# Patient Record
Sex: FEMALE | Race: WHITE | NOT HISPANIC OR LATINO | Employment: FULL TIME | URBAN - METROPOLITAN AREA
[De-identification: names, ages, dates, MRNs, and addresses within clinical notes are randomized per-mention and may not be internally consistent; named-entity substitution may affect disease eponyms.]

---

## 2017-01-09 RX ORDER — LEVOTHYROXINE SODIUM 0.12 MG/1
125 TABLET ORAL DAILY
Qty: 90 TABLET | Refills: 0 | Status: SHIPPED | OUTPATIENT
Start: 2017-01-09 | End: 2017-04-04 | Stop reason: SDUPTHER

## 2017-01-12 DIAGNOSIS — E27.40 CHRONIC ADRENAL INSUFFICIENCY (HCC): ICD-10-CM

## 2017-01-12 RX ORDER — HYDROCORTISONE 10 MG/1
10 TABLET ORAL
Qty: 540 TABLET | Refills: 1 | Status: SHIPPED | OUTPATIENT
Start: 2017-01-12 | End: 2017-01-30 | Stop reason: SDUPTHER

## 2017-01-23 RX ORDER — VALACYCLOVIR HYDROCHLORIDE 500 MG/1
TABLET, FILM COATED ORAL
Qty: 68 TABLET | Refills: 0 | Status: SHIPPED | OUTPATIENT
Start: 2017-01-23 | End: 2017-04-27 | Stop reason: SDUPTHER

## 2017-01-25 ENCOUNTER — TELEPHONE (OUTPATIENT)
Dept: ENDOCRINOLOGY | Age: 57
End: 2017-01-25

## 2017-01-25 DIAGNOSIS — E03.9 HYPOTHYROIDISM (ACQUIRED): ICD-10-CM

## 2017-01-25 DIAGNOSIS — E27.1 ADDISON'S DISEASE (HCC): Primary | ICD-10-CM

## 2017-01-25 DIAGNOSIS — E27.40 CHRONIC ADRENAL INSUFFICIENCY (HCC): ICD-10-CM

## 2017-01-25 DIAGNOSIS — E27.49 MINERALOCORTICOID DEFICIENCY (HCC): ICD-10-CM

## 2017-01-25 NOTE — TELEPHONE ENCOUNTER
----- Message from Pauline Sam sent at 1/25/2017 11:46 AM EST -----  Contact: PATIENT  I SCHEDULED LAB APPT FOR TOMORROW 1-26-17, 9:10AM.  WILL YOU PLEASE HAVE  LAB ORDERS ENTERED? THANK YOU.      ORDERS ENTERED

## 2017-01-26 ENCOUNTER — LAB (OUTPATIENT)
Dept: ENDOCRINOLOGY | Age: 57
End: 2017-01-26

## 2017-01-26 DIAGNOSIS — E27.40 CHRONIC ADRENAL INSUFFICIENCY (HCC): ICD-10-CM

## 2017-01-26 DIAGNOSIS — R53.82 CHRONIC FATIGUE: ICD-10-CM

## 2017-01-26 DIAGNOSIS — E27.49 MINERALOCORTICOID DEFICIENCY (HCC): ICD-10-CM

## 2017-01-26 DIAGNOSIS — R53.82 CHRONIC FATIGUE: Primary | ICD-10-CM

## 2017-01-26 DIAGNOSIS — E03.9 HYPOTHYROIDISM (ACQUIRED): ICD-10-CM

## 2017-01-26 DIAGNOSIS — E27.1 ADDISON'S DISEASE (HCC): ICD-10-CM

## 2017-01-27 LAB
25(OH)D3+25(OH)D2 SERPL-MCNC: 66 NG/ML (ref 30–100)
ACTH PLAS-MCNC: 24.2 PG/ML (ref 7.2–63.3)
ALBUMIN SERPL-MCNC: 4 G/DL (ref 3.5–5.2)
ALBUMIN/GLOB SERPL: 1.8 G/DL
ALP SERPL-CCNC: 189 U/L (ref 39–117)
ALT SERPL-CCNC: 31 U/L (ref 1–33)
AST SERPL-CCNC: 20 U/L (ref 1–32)
BILIRUB SERPL-MCNC: 0.2 MG/DL (ref 0.1–1.2)
BUN SERPL-MCNC: 18 MG/DL (ref 6–20)
BUN/CREAT SERPL: 14 (ref 7–25)
CALCIUM SERPL-MCNC: 8.7 MG/DL (ref 8.6–10.5)
CHLORIDE SERPL-SCNC: 105 MMOL/L (ref 98–107)
CO2 SERPL-SCNC: 22.7 MMOL/L (ref 22–29)
CORTIS AM PEAK SERPL-MCNC: 0.1 UG/DL (ref 6.2–19.4)
CREAT SERPL-MCNC: 1.29 MG/DL (ref 0.57–1)
GLOBULIN SER CALC-MCNC: 2.2 GM/DL
GLUCOSE SERPL-MCNC: 81 MG/DL (ref 65–99)
POTASSIUM SERPL-SCNC: 4.1 MMOL/L (ref 3.5–5.2)
PROT SERPL-MCNC: 6.2 G/DL (ref 6–8.5)
SODIUM SERPL-SCNC: 142 MMOL/L (ref 136–145)
T3 SERPL-MCNC: 85.4 NG/DL (ref 80–200)
T4 FREE SERPL-MCNC: 1.47 NG/DL (ref 0.93–1.7)
TSH SERPL DL<=0.005 MIU/L-ACNC: 1.88 MIU/ML (ref 0.27–4.2)

## 2017-01-27 RX ORDER — FLUDROCORTISONE ACETATE 0.1 MG/1
TABLET ORAL
Qty: 30 TABLET | Refills: 0 | Status: SHIPPED | OUTPATIENT
Start: 2017-01-27 | End: 2017-02-25 | Stop reason: SDUPTHER

## 2017-01-30 ENCOUNTER — OFFICE VISIT (OUTPATIENT)
Dept: ENDOCRINOLOGY | Age: 57
End: 2017-01-30

## 2017-01-30 VITALS
WEIGHT: 208 LBS | HEIGHT: 66 IN | BODY MASS INDEX: 33.43 KG/M2 | DIASTOLIC BLOOD PRESSURE: 68 MMHG | SYSTOLIC BLOOD PRESSURE: 102 MMHG | HEART RATE: 95 BPM | OXYGEN SATURATION: 98 %

## 2017-01-30 DIAGNOSIS — R53.82 CHRONIC FATIGUE: ICD-10-CM

## 2017-01-30 DIAGNOSIS — E27.40 CHRONIC ADRENAL INSUFFICIENCY (HCC): ICD-10-CM

## 2017-01-30 DIAGNOSIS — R63.5 ABNORMAL WEIGHT GAIN: ICD-10-CM

## 2017-01-30 DIAGNOSIS — E27.49 MINERALOCORTICOID DEFICIENCY (HCC): ICD-10-CM

## 2017-01-30 DIAGNOSIS — E03.8 OTHER SPECIFIED HYPOTHYROIDISM: Primary | ICD-10-CM

## 2017-01-30 PROCEDURE — 99215 OFFICE O/P EST HI 40 MIN: CPT | Performed by: INTERNAL MEDICINE

## 2017-01-30 RX ORDER — HYDROCORTISONE 10 MG/1
TABLET ORAL
Qty: 180 TABLET | Refills: 3 | Status: SHIPPED | OUTPATIENT
Start: 2017-01-30 | End: 2017-08-17 | Stop reason: SDUPTHER

## 2017-01-30 RX ORDER — LIOTHYRONINE SODIUM 5 UG/1
5 TABLET ORAL DAILY
Qty: 30 TABLET | Refills: 3 | Status: SHIPPED | OUTPATIENT
Start: 2017-01-30 | End: 2017-05-26 | Stop reason: SDUPTHER

## 2017-02-27 RX ORDER — FLUDROCORTISONE ACETATE 0.1 MG/1
TABLET ORAL
Qty: 30 TABLET | Refills: 0 | Status: SHIPPED | OUTPATIENT
Start: 2017-02-27 | End: 2017-03-25 | Stop reason: SDUPTHER

## 2017-03-27 RX ORDER — FLUDROCORTISONE ACETATE 0.1 MG/1
TABLET ORAL
Qty: 30 TABLET | Refills: 0 | Status: SHIPPED | OUTPATIENT
Start: 2017-03-27 | End: 2017-04-24 | Stop reason: SDUPTHER

## 2017-04-04 RX ORDER — LEVOTHYROXINE SODIUM 0.12 MG/1
TABLET ORAL
Qty: 90 TABLET | Refills: 0 | Status: SHIPPED | OUTPATIENT
Start: 2017-04-04 | End: 2017-07-04 | Stop reason: SDUPTHER

## 2017-04-24 RX ORDER — OMEPRAZOLE 20 MG/1
CAPSULE, DELAYED RELEASE ORAL
Qty: 90 CAPSULE | Refills: 1 | Status: SHIPPED | OUTPATIENT
Start: 2017-04-24 | End: 2017-08-17 | Stop reason: SDUPTHER

## 2017-04-24 RX ORDER — FLUDROCORTISONE ACETATE 0.1 MG/1
TABLET ORAL
Qty: 30 TABLET | Refills: 0 | Status: SHIPPED | OUTPATIENT
Start: 2017-04-24 | End: 2017-05-25 | Stop reason: SDUPTHER

## 2017-04-27 RX ORDER — VALACYCLOVIR HYDROCHLORIDE 500 MG/1
TABLET, FILM COATED ORAL
Qty: 68 TABLET | Refills: 0 | Status: SHIPPED | OUTPATIENT
Start: 2017-04-27 | End: 2017-06-18 | Stop reason: SDUPTHER

## 2017-05-09 ENCOUNTER — OFFICE VISIT (OUTPATIENT)
Dept: FAMILY MEDICINE CLINIC | Facility: CLINIC | Age: 57
End: 2017-05-09

## 2017-05-09 VITALS
DIASTOLIC BLOOD PRESSURE: 60 MMHG | OXYGEN SATURATION: 92 % | BODY MASS INDEX: 33.59 KG/M2 | HEART RATE: 101 BPM | HEIGHT: 66 IN | SYSTOLIC BLOOD PRESSURE: 120 MMHG | TEMPERATURE: 98.1 F | WEIGHT: 209 LBS

## 2017-05-09 DIAGNOSIS — E66.9 SIMPLE OBESITY: ICD-10-CM

## 2017-05-09 DIAGNOSIS — F90.0 ATTENTION DEFICIT HYPERACTIVITY DISORDER (ADHD), PREDOMINANTLY INATTENTIVE TYPE: Primary | ICD-10-CM

## 2017-05-09 DIAGNOSIS — D68.61 ANTIPHOSPHOLIPID SYNDROME (HCC): ICD-10-CM

## 2017-05-09 PROCEDURE — 99214 OFFICE O/P EST MOD 30 MIN: CPT | Performed by: FAMILY MEDICINE

## 2017-05-25 RX ORDER — FLUDROCORTISONE ACETATE 0.1 MG/1
TABLET ORAL
Qty: 30 TABLET | Refills: 0 | Status: SHIPPED | OUTPATIENT
Start: 2017-05-25 | End: 2017-06-24 | Stop reason: SDUPTHER

## 2017-05-26 RX ORDER — LIOTHYRONINE SODIUM 5 UG/1
TABLET ORAL
Qty: 30 TABLET | Refills: 2 | Status: SHIPPED | OUTPATIENT
Start: 2017-05-26 | End: 2017-07-12 | Stop reason: SDUPTHER

## 2017-06-19 DIAGNOSIS — E27.40 CHRONIC ADRENAL INSUFFICIENCY (HCC): Primary | ICD-10-CM

## 2017-06-19 DIAGNOSIS — E27.1 ADDISON'S DISEASE (HCC): ICD-10-CM

## 2017-06-19 DIAGNOSIS — E03.9 HYPOTHYROIDISM (ACQUIRED): ICD-10-CM

## 2017-06-19 LAB
ACTH PLAS-MCNC: 100.6 PG/ML (ref 7.2–63.3)
ALBUMIN SERPL-MCNC: 4.2 G/DL (ref 3.5–5.2)
ALBUMIN/GLOB SERPL: 1.7 G/DL
ALP SERPL-CCNC: 173 U/L (ref 39–117)
ALT SERPL-CCNC: 14 U/L (ref 1–33)
AST SERPL-CCNC: 17 U/L (ref 1–32)
BILIRUB SERPL-MCNC: 0.4 MG/DL (ref 0.1–1.2)
BUN SERPL-MCNC: 15 MG/DL (ref 6–20)
BUN/CREAT SERPL: 12.6 (ref 7–25)
CALCIUM SERPL-MCNC: 9.1 MG/DL (ref 8.6–10.5)
CHLORIDE SERPL-SCNC: 105 MMOL/L (ref 98–107)
CO2 SERPL-SCNC: 23.1 MMOL/L (ref 22–29)
CORTIS SERPL-MCNC: 0.2 UG/DL
CREAT SERPL-MCNC: 1.19 MG/DL (ref 0.57–1)
GLOBULIN SER CALC-MCNC: 2.5 GM/DL
GLUCOSE SERPL-MCNC: 85 MG/DL (ref 65–99)
POTASSIUM SERPL-SCNC: 4.2 MMOL/L (ref 3.5–5.2)
PROT SERPL-MCNC: 6.7 G/DL (ref 6–8.5)
SODIUM SERPL-SCNC: 143 MMOL/L (ref 136–145)
T3 SERPL-MCNC: 124.4 NG/DL (ref 80–200)
T4 FREE SERPL-MCNC: 1.7 NG/DL (ref 0.93–1.7)
TSH SERPL DL<=0.005 MIU/L-ACNC: 0.15 MIU/ML (ref 0.27–4.2)

## 2017-06-19 RX ORDER — VALACYCLOVIR HYDROCHLORIDE 500 MG/1
TABLET, FILM COATED ORAL
Qty: 68 TABLET | Refills: 0 | Status: SHIPPED | OUTPATIENT
Start: 2017-06-19 | End: 2017-08-17 | Stop reason: SDUPTHER

## 2017-06-20 ENCOUNTER — RESULTS ENCOUNTER (OUTPATIENT)
Dept: ENDOCRINOLOGY | Age: 57
End: 2017-06-20

## 2017-06-20 DIAGNOSIS — E27.40 CHRONIC ADRENAL INSUFFICIENCY (HCC): ICD-10-CM

## 2017-06-20 DIAGNOSIS — E03.9 HYPOTHYROIDISM (ACQUIRED): ICD-10-CM

## 2017-06-20 DIAGNOSIS — E27.1 ADDISON'S DISEASE (HCC): ICD-10-CM

## 2017-06-26 RX ORDER — FLUDROCORTISONE ACETATE 0.1 MG/1
TABLET ORAL
Qty: 30 TABLET | Refills: 0 | Status: SHIPPED | OUTPATIENT
Start: 2017-06-26 | End: 2017-07-12 | Stop reason: SDUPTHER

## 2017-07-05 RX ORDER — LEVOTHYROXINE SODIUM 0.12 MG/1
TABLET ORAL
Qty: 90 TABLET | Refills: 0 | Status: SHIPPED | OUTPATIENT
Start: 2017-07-05 | End: 2017-07-12 | Stop reason: SDUPTHER

## 2017-07-12 ENCOUNTER — OFFICE VISIT (OUTPATIENT)
Dept: ENDOCRINOLOGY | Age: 57
End: 2017-07-12

## 2017-07-12 VITALS
DIASTOLIC BLOOD PRESSURE: 82 MMHG | OXYGEN SATURATION: 98 % | WEIGHT: 209 LBS | HEIGHT: 66 IN | BODY MASS INDEX: 33.59 KG/M2 | HEART RATE: 93 BPM | SYSTOLIC BLOOD PRESSURE: 124 MMHG

## 2017-07-12 DIAGNOSIS — E27.49 MINERALOCORTICOID DEFICIENCY (HCC): ICD-10-CM

## 2017-07-12 DIAGNOSIS — E27.40 CHRONIC ADRENAL INSUFFICIENCY (HCC): Primary | ICD-10-CM

## 2017-07-12 DIAGNOSIS — E03.9 HYPOTHYROIDISM (ACQUIRED): ICD-10-CM

## 2017-07-12 DIAGNOSIS — R63.5 ABNORMAL WEIGHT GAIN: ICD-10-CM

## 2017-07-12 DIAGNOSIS — R53.82 CHRONIC FATIGUE: ICD-10-CM

## 2017-07-12 PROCEDURE — 99214 OFFICE O/P EST MOD 30 MIN: CPT | Performed by: INTERNAL MEDICINE

## 2017-07-12 RX ORDER — FLUDROCORTISONE ACETATE 0.1 MG/1
0.1 TABLET ORAL DAILY
Qty: 90 TABLET | Refills: 1 | Status: SHIPPED | OUTPATIENT
Start: 2017-07-12 | End: 2018-01-25 | Stop reason: SDUPTHER

## 2017-07-12 RX ORDER — LEVOTHYROXINE SODIUM 0.12 MG/1
TABLET ORAL
Qty: 90 TABLET | Refills: 1 | Status: SHIPPED | OUTPATIENT
Start: 2017-07-12 | End: 2017-08-17 | Stop reason: SDUPTHER

## 2017-07-12 RX ORDER — LIOTHYRONINE SODIUM 5 UG/1
5 TABLET ORAL 2 TIMES DAILY
Qty: 180 TABLET | Refills: 1 | Status: SHIPPED | OUTPATIENT
Start: 2017-07-12 | End: 2017-07-13 | Stop reason: SDUPTHER

## 2017-07-12 RX ORDER — WARFARIN SODIUM 7.5 MG/1
7.5 TABLET ORAL
COMMUNITY
Start: 2017-06-27 | End: 2018-12-20 | Stop reason: DRUGHIGH

## 2017-07-13 RX ORDER — LIOTHYRONINE SODIUM 5 UG/1
5 TABLET ORAL 2 TIMES DAILY
Qty: 60 TABLET | Refills: 1 | Status: SHIPPED | OUTPATIENT
Start: 2017-07-13 | End: 2018-03-19 | Stop reason: SDUPTHER

## 2017-07-13 RX ORDER — LEVOTHYROXINE SODIUM 0.1 MG/1
100 TABLET ORAL DAILY
Qty: 90 TABLET | Refills: 1 | Status: SHIPPED | OUTPATIENT
Start: 2017-07-13 | End: 2018-01-10 | Stop reason: SDUPTHER

## 2017-08-02 PROBLEM — R63.5 ABNORMAL WEIGHT GAIN: Status: ACTIVE | Noted: 2017-08-02

## 2017-08-17 ENCOUNTER — HOSPITAL ENCOUNTER (OUTPATIENT)
Facility: HOSPITAL | Age: 57
Setting detail: OBSERVATION
Discharge: HOME | End: 2017-08-18
Attending: EMERGENCY MEDICINE | Admitting: HOSPITALIST

## 2017-08-17 DIAGNOSIS — R50.9 FEVER, UNSPECIFIED FEVER CAUSE: ICD-10-CM

## 2017-08-17 DIAGNOSIS — R11.2 INTRACTABLE VOMITING WITH NAUSEA, UNSPECIFIED VOMITING TYPE: Primary | ICD-10-CM

## 2017-08-17 DIAGNOSIS — E27.1 ADDISON'S DISEASE (HCC): ICD-10-CM

## 2017-08-17 PROBLEM — R19.7 DIARRHEA: Status: ACTIVE | Noted: 2017-08-17

## 2017-08-17 PROBLEM — D69.6 THROMBOCYTOPENIA (HCC): Status: ACTIVE | Noted: 2017-08-17

## 2017-08-17 PROBLEM — Z79.01 LONG TERM (CURRENT) USE OF ANTICOAGULANTS: Status: ACTIVE | Noted: 2017-08-17

## 2017-08-17 LAB
ALBUMIN SERPL-MCNC: 3.4 G/DL (ref 3.5–5.2)
ALBUMIN/GLOB SERPL: 1.3 G/DL
ALP SERPL-CCNC: 161 U/L (ref 39–117)
ALT SERPL W P-5'-P-CCNC: 29 U/L (ref 1–33)
ANION GAP SERPL CALCULATED.3IONS-SCNC: 12.7 MMOL/L
AST SERPL-CCNC: 40 U/L (ref 1–32)
BACTERIA UR QL AUTO: ABNORMAL /HPF
BASOPHILS # BLD AUTO: 0.01 10*3/MM3 (ref 0–0.2)
BASOPHILS NFR BLD AUTO: 0.2 % (ref 0–1.5)
BILIRUB SERPL-MCNC: 0.5 MG/DL (ref 0.1–1.2)
BILIRUB UR QL STRIP: NEGATIVE
BUN BLD-MCNC: 22 MG/DL (ref 6–20)
BUN/CREAT SERPL: 21.6 (ref 7–25)
CALCIUM SPEC-SCNC: 8.2 MG/DL (ref 8.6–10.5)
CHLORIDE SERPL-SCNC: 105 MMOL/L (ref 98–107)
CLARITY UR: CLEAR
CO2 SERPL-SCNC: 21.3 MMOL/L (ref 22–29)
COLOR UR: ABNORMAL
CORTIS SERPL-MCNC: 32.44 MCG/DL
CREAT BLD-MCNC: 1.02 MG/DL (ref 0.57–1)
D-LACTATE SERPL-SCNC: 0.8 MMOL/L (ref 0.5–2)
DEPRECATED RDW RBC AUTO: 45.6 FL (ref 37–54)
EOSINOPHIL # BLD AUTO: 0.21 10*3/MM3 (ref 0–0.7)
EOSINOPHIL NFR BLD AUTO: 3.4 % (ref 0.3–6.2)
ERYTHROCYTE [DISTWIDTH] IN BLOOD BY AUTOMATED COUNT: 14.1 % (ref 11.7–13)
GFR SERPL CREATININE-BSD FRML MDRD: 56 ML/MIN/1.73
GLOBULIN UR ELPH-MCNC: 2.6 GM/DL
GLUCOSE BLD-MCNC: 114 MG/DL (ref 65–99)
GLUCOSE UR STRIP-MCNC: NEGATIVE MG/DL
HCT VFR BLD AUTO: 47.9 % (ref 35.6–45.5)
HGB BLD-MCNC: 15.3 G/DL (ref 11.9–15.5)
HGB UR QL STRIP.AUTO: NEGATIVE
HYALINE CASTS UR QL AUTO: ABNORMAL /LPF
IMM GRANULOCYTES # BLD: 0.03 10*3/MM3 (ref 0–0.03)
IMM GRANULOCYTES NFR BLD: 0.5 % (ref 0–0.5)
INR PPP: 2.95 (ref 0.9–1.1)
KETONES UR QL STRIP: NEGATIVE
LEUKOCYTE ESTERASE UR QL STRIP.AUTO: ABNORMAL
LIPASE SERPL-CCNC: 59 U/L (ref 13–60)
LYMPHOCYTES # BLD AUTO: 1.01 10*3/MM3 (ref 0.9–4.8)
LYMPHOCYTES NFR BLD AUTO: 16.5 % (ref 19.6–45.3)
MCH RBC QN AUTO: 28.3 PG (ref 26.9–32)
MCHC RBC AUTO-ENTMCNC: 31.9 G/DL (ref 32.4–36.3)
MCV RBC AUTO: 88.5 FL (ref 80.5–98.2)
MONOCYTES # BLD AUTO: 0.26 10*3/MM3 (ref 0.2–1.2)
MONOCYTES NFR BLD AUTO: 4.3 % (ref 5–12)
NEUTROPHILS # BLD AUTO: 4.59 10*3/MM3 (ref 1.9–8.1)
NEUTROPHILS NFR BLD AUTO: 75.1 % (ref 42.7–76)
NITRITE UR QL STRIP: NEGATIVE
PH UR STRIP.AUTO: 6 [PH] (ref 5–8)
PLATELET # BLD AUTO: 109 10*3/MM3 (ref 140–500)
PMV BLD AUTO: 10.9 FL (ref 6–12)
POTASSIUM BLD-SCNC: 4.1 MMOL/L (ref 3.5–5.2)
PROT SERPL-MCNC: 6 G/DL (ref 6–8.5)
PROT UR QL STRIP: ABNORMAL
PROTHROMBIN TIME: 29.8 SECONDS (ref 11.7–14.2)
RBC # BLD AUTO: 5.41 10*6/MM3 (ref 3.9–5.2)
RBC # UR: ABNORMAL /HPF
REF LAB TEST METHOD: ABNORMAL
SODIUM BLD-SCNC: 139 MMOL/L (ref 136–145)
SP GR UR STRIP: 1.03 (ref 1–1.03)
SQUAMOUS #/AREA URNS HPF: ABNORMAL /HPF
UROBILINOGEN UR QL STRIP: ABNORMAL
WBC NRBC COR # BLD: 6.11 10*3/MM3 (ref 4.5–10.7)
WBC UR QL AUTO: ABNORMAL /HPF

## 2017-08-17 PROCEDURE — 85025 COMPLETE CBC W/AUTO DIFF WBC: CPT | Performed by: EMERGENCY MEDICINE

## 2017-08-17 PROCEDURE — 25010000002 HYDROCORTISONE SODIUM SUCCINATE 100 MG RECONSTITUTED SOLUTION: Performed by: HOSPITALIST

## 2017-08-17 PROCEDURE — 25010000002 ONDANSETRON PER 1 MG

## 2017-08-17 PROCEDURE — G0378 HOSPITAL OBSERVATION PER HR: HCPCS

## 2017-08-17 PROCEDURE — 82533 TOTAL CORTISOL: CPT | Performed by: INTERNAL MEDICINE

## 2017-08-17 PROCEDURE — 81001 URINALYSIS AUTO W/SCOPE: CPT | Performed by: EMERGENCY MEDICINE

## 2017-08-17 PROCEDURE — 80053 COMPREHEN METABOLIC PANEL: CPT | Performed by: EMERGENCY MEDICINE

## 2017-08-17 PROCEDURE — 96361 HYDRATE IV INFUSION ADD-ON: CPT

## 2017-08-17 PROCEDURE — 99284 EMERGENCY DEPT VISIT MOD MDM: CPT

## 2017-08-17 PROCEDURE — 85610 PROTHROMBIN TIME: CPT | Performed by: EMERGENCY MEDICINE

## 2017-08-17 PROCEDURE — 96375 TX/PRO/DX INJ NEW DRUG ADDON: CPT

## 2017-08-17 PROCEDURE — 96374 THER/PROPH/DIAG INJ IV PUSH: CPT

## 2017-08-17 PROCEDURE — 83605 ASSAY OF LACTIC ACID: CPT | Performed by: EMERGENCY MEDICINE

## 2017-08-17 PROCEDURE — 83690 ASSAY OF LIPASE: CPT | Performed by: EMERGENCY MEDICINE

## 2017-08-17 PROCEDURE — 36415 COLL VENOUS BLD VENIPUNCTURE: CPT | Performed by: EMERGENCY MEDICINE

## 2017-08-17 PROCEDURE — 25010000002 ONDANSETRON PER 1 MG: Performed by: HOSPITALIST

## 2017-08-17 PROCEDURE — 99215 OFFICE O/P EST HI 40 MIN: CPT | Performed by: INTERNAL MEDICINE

## 2017-08-17 PROCEDURE — 87086 URINE CULTURE/COLONY COUNT: CPT | Performed by: EMERGENCY MEDICINE

## 2017-08-17 PROCEDURE — 25010000003 HYDROCORTISONE SOD SUCCINATE PF 250 MG RECONSTITUTED SOLUTION: Performed by: EMERGENCY MEDICINE

## 2017-08-17 PROCEDURE — 96376 TX/PRO/DX INJ SAME DRUG ADON: CPT

## 2017-08-17 PROCEDURE — 87040 BLOOD CULTURE FOR BACTERIA: CPT | Performed by: EMERGENCY MEDICINE

## 2017-08-17 RX ORDER — PANTOPRAZOLE SODIUM 40 MG/1
40 TABLET, DELAYED RELEASE ORAL EVERY MORNING
Status: DISCONTINUED | OUTPATIENT
Start: 2017-08-18 | End: 2017-08-18 | Stop reason: HOSPADM

## 2017-08-17 RX ORDER — SODIUM CHLORIDE 0.9 % (FLUSH) 0.9 %
1-10 SYRINGE (ML) INJECTION AS NEEDED
Status: DISCONTINUED | OUTPATIENT
Start: 2017-08-17 | End: 2017-08-18 | Stop reason: HOSPADM

## 2017-08-17 RX ORDER — ACETAMINOPHEN 500 MG
1000 TABLET ORAL ONCE
Status: COMPLETED | OUTPATIENT
Start: 2017-08-17 | End: 2017-08-17

## 2017-08-17 RX ORDER — FLUDROCORTISONE ACETATE 0.1 MG/1
0.1 TABLET ORAL DAILY
Status: DISCONTINUED | OUTPATIENT
Start: 2017-08-17 | End: 2017-08-18 | Stop reason: HOSPADM

## 2017-08-17 RX ORDER — SODIUM CHLORIDE 9 MG/ML
125 INJECTION, SOLUTION INTRAVENOUS CONTINUOUS
Status: DISCONTINUED | OUTPATIENT
Start: 2017-08-17 | End: 2017-08-17

## 2017-08-17 RX ORDER — VALACYCLOVIR HYDROCHLORIDE 500 MG/1
500 TABLET, FILM COATED ORAL 2 TIMES DAILY PRN
COMMUNITY
End: 2017-12-12 | Stop reason: SDUPTHER

## 2017-08-17 RX ORDER — WARFARIN SODIUM 1 MG/1
1 TABLET ORAL
Status: DISCONTINUED | OUTPATIENT
Start: 2017-08-17 | End: 2017-08-18 | Stop reason: HOSPADM

## 2017-08-17 RX ORDER — SODIUM CHLORIDE 0.9 % (FLUSH) 0.9 %
10 SYRINGE (ML) INJECTION AS NEEDED
Status: DISCONTINUED | OUTPATIENT
Start: 2017-08-17 | End: 2017-08-18 | Stop reason: HOSPADM

## 2017-08-17 RX ORDER — ONDANSETRON 2 MG/ML
INJECTION INTRAMUSCULAR; INTRAVENOUS
Status: COMPLETED
Start: 2017-08-17 | End: 2017-08-17

## 2017-08-17 RX ORDER — LIOTHYRONINE SODIUM 5 UG/1
5 TABLET ORAL 2 TIMES DAILY
Status: DISCONTINUED | OUTPATIENT
Start: 2017-08-17 | End: 2017-08-18 | Stop reason: HOSPADM

## 2017-08-17 RX ORDER — HYDROCORTISONE 10 MG/1
10 TABLET ORAL 2 TIMES DAILY
Status: ON HOLD | COMMUNITY
End: 2017-08-18

## 2017-08-17 RX ORDER — WARFARIN SODIUM 7.5 MG/1
7.5 TABLET ORAL
Status: DISCONTINUED | OUTPATIENT
Start: 2017-08-17 | End: 2017-08-18 | Stop reason: HOSPADM

## 2017-08-17 RX ORDER — OMEPRAZOLE 20 MG/1
20 CAPSULE, DELAYED RELEASE ORAL NIGHTLY
COMMUNITY
End: 2019-03-04 | Stop reason: SDUPTHER

## 2017-08-17 RX ORDER — ASPIRIN 81 MG/1
81 TABLET ORAL DAILY
Status: DISCONTINUED | OUTPATIENT
Start: 2017-08-17 | End: 2017-08-18 | Stop reason: HOSPADM

## 2017-08-17 RX ORDER — ONDANSETRON 2 MG/ML
4 INJECTION INTRAMUSCULAR; INTRAVENOUS ONCE
Status: COMPLETED | OUTPATIENT
Start: 2017-08-17 | End: 2017-08-17

## 2017-08-17 RX ORDER — ONDANSETRON 2 MG/ML
4 INJECTION INTRAMUSCULAR; INTRAVENOUS EVERY 6 HOURS PRN
Status: DISCONTINUED | OUTPATIENT
Start: 2017-08-17 | End: 2017-08-18 | Stop reason: HOSPADM

## 2017-08-17 RX ORDER — LEVOTHYROXINE SODIUM 0.1 MG/1
100 TABLET ORAL DAILY
Status: DISCONTINUED | OUTPATIENT
Start: 2017-08-17 | End: 2017-08-18 | Stop reason: HOSPADM

## 2017-08-17 RX ORDER — SODIUM CHLORIDE 9 MG/ML
100 INJECTION, SOLUTION INTRAVENOUS CONTINUOUS
Status: DISCONTINUED | OUTPATIENT
Start: 2017-08-17 | End: 2017-08-18 | Stop reason: HOSPADM

## 2017-08-17 RX ADMIN — SODIUM CHLORIDE 1000 ML: 9 INJECTION, SOLUTION INTRAVENOUS at 10:07

## 2017-08-17 RX ADMIN — SODIUM CHLORIDE 125 ML/HR: 9 INJECTION, SOLUTION INTRAVENOUS at 13:39

## 2017-08-17 RX ADMIN — ONDANSETRON 4 MG: 2 INJECTION INTRAMUSCULAR; INTRAVENOUS at 10:07

## 2017-08-17 RX ADMIN — WARFARIN SODIUM 7.5 MG: 7.5 TABLET ORAL at 18:43

## 2017-08-17 RX ADMIN — HYDROCORTISONE SODIUM SUCCINATE 100 MG: 250 INJECTION, POWDER, FOR SOLUTION INTRAMUSCULAR; INTRAVENOUS at 10:15

## 2017-08-17 RX ADMIN — HYDROCORTISONE SODIUM SUCCINATE 50 MG: 100 INJECTION, POWDER, FOR SOLUTION INTRAMUSCULAR; INTRAVENOUS at 18:44

## 2017-08-17 RX ADMIN — SODIUM CHLORIDE 100 ML/HR: 9 INJECTION, SOLUTION INTRAVENOUS at 22:58

## 2017-08-17 RX ADMIN — ONDANSETRON 4 MG: 2 INJECTION INTRAMUSCULAR; INTRAVENOUS at 17:34

## 2017-08-17 RX ADMIN — ACETAMINOPHEN 1000 MG: 500 TABLET ORAL at 13:19

## 2017-08-17 RX ADMIN — WARFARIN SODIUM 1 MG: 1 TABLET ORAL at 18:44

## 2017-08-17 RX ADMIN — ASPIRIN 81 MG: 81 TABLET ORAL at 18:43

## 2017-08-17 NOTE — ED NOTES
Phlebotomy called to obtained pt's blood. Stuck twice with no success in obtaining blood work.     Nathaly An RN  08/17/17 [id number]

## 2017-08-17 NOTE — ED PROVIDER NOTES
EMERGENCY DEPARTMENT ENCOUNTER       CHIEF COMPLAINT  Chief Complaint: vomiting  History given by: patient, family  History limited by: N/A  Room Number: 17/17  PMD: Lorne Lyle DO  Endocrinologist- Dr Donahue       HPI:  HPI Comments: Per family, pt has hx of [place]'s Disease for which she is chronically on steroids. Pt states that last night, she developed nausea and a fever with max temperature of 100F. At about 0600 this morning, she started vomiting (has had approximately 6 episodes so far). She has also had diarrhea, lower abd cramping, and generalized weakness. She denies chest pain, trouble breathing, pain and difficulty with urination, cough, black or bloody stools, bloody emesis, recent sick contact, and recent possible bad food exposure. Pt states that she called her PMD's office and was referred to ED for further evaluation. She has past surgical hx of appendectomy, cholecystectomy, and hysterectomy. Pt has no other complaints at this time.     Patient is a 57 y.o. female presenting with vomiting.   History provided by:  Patient and relative  Vomiting   The primary symptoms include fever, abdominal pain, nausea, vomiting and diarrhea. Primary symptoms do not include dysuria or myalgias. Episode onset: fever and nausea started last night, vomiting started this morning at about 0600. The onset was gradual. The problem has not changed since onset.  The fever began yesterday. The fever has been unchanged since its onset. The maximum temperature recorded prior to her arrival was 100 to 100.9 F.   Pain location: lower abdomen.   The vomiting began today. Frequency: approximately 6 times since 0600 today.   The illness does not include chills.         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   • Chronic adrenal insufficiency 02/04/2016   • Antiphospholipid syndrome 02/04/2016   • Dyskinesia of esophagus 02/04/2016   • Simple obesity  02/04/2016   • Fatigue 02/04/2016   • Gastroesophageal reflux disease 02/04/2016   • Loss of hair 02/04/2016   • Herpes simplex type 1 infection 02/04/2016   • Mineralocorticoid deficiency 02/04/2016   • Muscle weakness 02/04/2016   • Menopausal and postmenopausal disorder 02/04/2016   • Renal insufficiency 02/04/2016   • Emotional stress reaction 02/04/2016   • Cerebrovascular accident 02/04/2016   • Hypothyroidism (acquired) 03/01/2016   • Deep vein thrombosis 06/24/2013   • Erosive esophagitis 01/19/2014   • Abnormal weight gain 08/02/2017     Resolved Ambulatory Problems     Diagnosis Date Noted   • Abdominal pain 02/04/2016   • Abnormal weight gain 02/04/2016   • Acute upper respiratory infection 02/04/2016   • Abnormal liver function tests 02/04/2016   • Calculus of gallbladder 02/04/2016   • Hyperkalemia 02/04/2016   • Hypothyroidism 02/04/2016   • Nausea 02/04/2016   • Candidiasis of vagina 02/04/2016     Past Medical History:   Diagnosis Date   • Amaurosis fugax    • Antiphospholipid antibody positive    • Atrial premature complex    • Breast lump    • Cyst, breast    • Exogenous obesity    • Headache    • Hemianopsia    • Hiatal hernia    • Hx of bladder infections    • Hypothyroidism    • Migraine    • Nephrolithiasis    • Onset of menses    • Pregnancy    • PVC's (premature ventricular contractions)    • Renal calculi    • Stress reaction    • Thrombocytopenia    • Thyroid disease    • Tinnitus    • Ventricular tachycardia    • Vertigo          PAST SURGICAL HISTORY  Past Surgical History:   Procedure Laterality Date   • COLONOSCOPY     • DILATATION AND CURETTAGE      d&C with CCK secondary to HPV in 1990   • HYSTERECTOMY     • UPPER GASTROINTESTINAL ENDOSCOPY           FAMILY HISTORY  Family History   Problem Relation Age of Onset   • Breast cancer Daughter    • Clotting disorder Other    • Hypothyroidism Other    • Hypertension Other          SOCIAL HISTORY  Social History     Social History   •  Marital status:      Spouse name: N/A   • Number of children: N/A   • Years of education: N/A     Occupational History   • Not on file.     Social History Main Topics   • Smoking status: Never Smoker   • Smokeless tobacco: Not on file   • Alcohol use No   • Drug use: No   • Sexual activity: Defer     Other Topics Concern   • Not on file     Social History Narrative   • No narrative on file         ALLERGIES  Heparin; Adhesive tape; and Latex        REVIEW OF SYSTEMS  Review of Systems   Constitutional: Positive for fever. Negative for chills.   HENT: Negative for congestion, rhinorrhea and sore throat.    Eyes: Negative for pain.   Respiratory: Negative for cough and shortness of breath.    Cardiovascular: Negative for chest pain and palpitations.   Gastrointestinal: Positive for abdominal pain, diarrhea, nausea and vomiting.   Endocrine: Negative.    Genitourinary: Negative for difficulty urinating and dysuria.   Musculoskeletal: Negative for myalgias.   Skin: Negative.    Neurological: Positive for weakness (generalized weakness, no focal weakness). Negative for speech difficulty, numbness and headaches.   Psychiatric/Behavioral: Negative.    All other systems reviewed and are negative.           PHYSICAL EXAM  ED Triage Vitals   Temp Heart Rate Resp BP SpO2   08/17/17 0942 08/17/17 0942 08/17/17 0942 08/17/17 0950 08/17/17 0942   99.8 °F (37.7 °C) 98 20 117/67 98 % WNL      Temp src Heart Rate Source Patient Position BP Location FiO2 (%)   08/17/17 0942 08/17/17 0942 08/17/17 0950 08/17/17 0950 --   Tympanic Monitor Sitting Right arm        Physical Exam   Constitutional: She is oriented to person, place, and time. No distress.   Mildly ill appearing, speaks softly    HENT:   Head: Normocephalic.   Mouth/Throat: Mucous membranes are dry.   Eyes: EOM are normal. Pupils are equal, round, and reactive to light.   Neck: Normal range of motion. Neck supple.   Cardiovascular: Normal rate, regular rhythm and  normal heart sounds.    Pulmonary/Chest: Effort normal and breath sounds normal. No respiratory distress. She has no decreased breath sounds. She has no wheezes. She has no rhonchi. She has no rales.   Abdominal: Soft. There is no tenderness. There is no rebound and no guarding.   Musculoskeletal: Normal range of motion.   Neurological: She is alert and oriented to person, place, and time. She has normal motor skills and normal sensation.   Skin: Skin is warm and dry.   Psychiatric: Mood and affect normal.   Nursing note and vitals reviewed.          LAB RESULTS  Recent Results (from the past 24 hour(s))   Lipase    Collection Time: 08/17/17 11:22 AM   Result Value Ref Range    Lipase 59 13 - 60 U/L   Protime-INR    Collection Time: 08/17/17 11:22 AM   Result Value Ref Range    Protime 29.8 (H) 11.7 - 14.2 Seconds    INR 2.95 (H) 0.90 - 1.10   CBC Auto Differential    Collection Time: 08/17/17 11:22 AM   Result Value Ref Range    WBC 6.11 4.50 - 10.70 10*3/mm3    RBC 5.41 (H) 3.90 - 5.20 10*6/mm3    Hemoglobin 15.3 11.9 - 15.5 g/dL    Hematocrit 47.9 (H) 35.6 - 45.5 %    MCV 88.5 80.5 - 98.2 fL    MCH 28.3 26.9 - 32.0 pg    MCHC 31.9 (L) 32.4 - 36.3 g/dL    RDW 14.1 (H) 11.7 - 13.0 %    RDW-SD 45.6 37.0 - 54.0 fl    MPV 10.9 6.0 - 12.0 fL    Platelets 109 (L) 140 - 500 10*3/mm3    Neutrophil % 75.1 42.7 - 76.0 %    Lymphocyte % 16.5 (L) 19.6 - 45.3 %    Monocyte % 4.3 (L) 5.0 - 12.0 %    Eosinophil % 3.4 0.3 - 6.2 %    Basophil % 0.2 0.0 - 1.5 %    Immature Grans % 0.5 0.0 - 0.5 %    Neutrophils, Absolute 4.59 1.90 - 8.10 10*3/mm3    Lymphocytes, Absolute 1.01 0.90 - 4.80 10*3/mm3    Monocytes, Absolute 0.26 0.20 - 1.20 10*3/mm3    Eosinophils, Absolute 0.21 0.00 - 0.70 10*3/mm3    Basophils, Absolute 0.01 0.00 - 0.20 10*3/mm3    Immature Grans, Absolute 0.03 0.00 - 0.03 10*3/mm3   Comprehensive Metabolic Panel    Collection Time: 08/17/17 12:49 PM   Result Value Ref Range    Glucose 114 (H) 65 - 99 mg/dL    BUN 22  (H) 6 - 20 mg/dL    Creatinine 1.02 (H) 0.57 - 1.00 mg/dL    Sodium 139 136 - 145 mmol/L    Potassium 4.1 3.5 - 5.2 mmol/L    Chloride 105 98 - 107 mmol/L    CO2 21.3 (L) 22.0 - 29.0 mmol/L    Calcium 8.2 (L) 8.6 - 10.5 mg/dL    Total Protein 6.0 6.0 - 8.5 g/dL    Albumin 3.40 (L) 3.50 - 5.20 g/dL    ALT (SGPT) 29 1 - 33 U/L    AST (SGOT) 40 (H) 1 - 32 U/L    Alkaline Phosphatase 161 (H) 39 - 117 U/L    Total Bilirubin 0.5 0.1 - 1.2 mg/dL    eGFR Non African Amer 56 (L) >60 mL/min/1.73    Globulin 2.6 gm/dL    A/G Ratio 1.3 g/dL    BUN/Creatinine Ratio 21.6 7.0 - 25.0    Anion Gap 12.7 mmol/L   Lactic Acid, Plasma    Collection Time: 08/17/17 12:49 PM   Result Value Ref Range    Lactate 0.8 0.5 - 2.0 mmol/L       Ordered the above labs and reviewed the results.          PROCEDURES  Procedures        PROGRESS AND CONSULTS  ED Course     10:05 AM- Per RN, pt is actively vomiting. Ordered IV fluids for hydration, zofran for vomiting, and stress dose of solucortef as pt is chronically on steroids. Ordered CT abd/pel, blood cultures, lactic acid, PT with INR, lipase, blood work, and UA for further evaluation.     1:15 PM- Per RN, pt has fever with temperature of 100.2F. Ordered tylenol.     1:20 PM- Rechecked pt. She is resting comfortably and is in no acute distress. Discussed with pt and family that I am still awaiting lab results. Pt would like to know why I ordered CT abd/pel. Informed pt that my original plan was to obtain CT abd/pel for further evaluation and to rule out possible SBO as a cause of her sx since she has hx of multiple abdominal surgeries. Pt refuses CT abd/pel and requests that Dr Donahue (endocrinologist) be consulted. Discussed pt admission for further care and observation. Pt and family verbalize understanding and agree with plan. Pt is ready for admission.    1:24 PM- Sent call out to Dr Donahue (endocrinologist) for consult.      1:32 PM- Discussed case with Dr Donahue   Reviewed  history, exam, results and treatments.  Discussed concerns and plan of care. Dr Donahue will consult and recommends administering 50mg IV hydrocortisone every 8 hours if pt continues to vomit.     1:33 PM- Sent call out to [facility] for admission. Admission decision time= 1333    1:35 PM- Ordered maintenance IV fluids.     1:45 PM- Discussed case with Dr Aranda ([facility] hospitalist)  Reviewed history, exam, results and treatments.  Discussed concerns and plan of care. Dr Aranda accepts pt to be admitted to telemetry.    2:05 PM- Rechecked pt. HR is 109. BP is 125/64. O2 sat is 96% on room air. Discussed with pt and family about all pertinent results including elevated creatinine of 1.02, normal potassium level, normal sodium level, normal WBC count, and normal hemoglobin. I offered to order KUB for further evaluation but pt declines. Informed pt that Dr Huntley has been consulted and that she will be admitted to [facility] hospitalist. Pt verbalizes understanding and agreement.         MEDICAL DECISION MAKING  Results were reviewed/discussed with the patient and family.     MDM  Number of Diagnoses or Management Options  [place]'s disease:   Fever, unspecified fever cause:   Intractable vomiting with nausea, unspecified vomiting type:   Diagnosis management comments: Patient presented the ER complaining of multiple episodes of vomiting.  She has a history of [place]'s disease and is on chronic steroids.  Patient was given IV fluids, IV Zofran, and IV hydrocortisone.  She declined CT scan of her abdomen pelvis even after I discussed with her that she could have a small bowel obstruction.  Patient did develop a low-grade temperature while in the ER.  Her white blood cell count and lactic acid were normal.  She was mildly tachycardic.  Her blood pressure was normal.  Case was discussed with Dr. Del Rosario,, the patient's endocrinologist, and he will see the patient in consult.  Case was also discussed with Dr. Aranda and he  will admit the patient.  Patient also declined plain films of her abdomen.       Amount and/or Complexity of Data Reviewed  Clinical lab tests: ordered and reviewed (Lactic acid= 0.8, BUN= 22, creatinine= 1.02, WBC= 6.11, hgb= 15.3, lipase= 59, ALT= 29, AST= 40, alk phos= 161, potassium= 4.1, sodium= 139)  Decide to obtain previous medical records or to obtain history from someone other than the patient: yes  Review and summarize past medical records: yes (Pt was last seen in ED in 2/2016 for near syncope. She was last admitted in 8/2015 for dehydration, diarrhea, and acute renal failure.     2 months ago, BUN was 15, creatinine was 1.19, ALT was 14, AST was 17, and alk phos was 173.)  Discuss the patient with other providers: yes (Dr Donahue (endocrinologist), Dr Aranda ([facility] hospitalist))  Independent visualization of images, tracings, or specimens: yes    Patient Progress  Patient progress: stable             DIAGNOSIS  Final diagnoses:   Intractable vomiting with nausea, unspecified vomiting type   Ethan's disease   Fever, unspecified fever cause         DISPOSITION  Admitted- telemetry    Discussed treatment plan and reason for admission with pt/family and admitting physician.  Pt/family voiced understanding of the plan for admission for further testing/treatment as needed.         Latest Documented Vital Signs:  As of 2:07 PM  BP- 125/64 HR- 105 Temp- 100.2 °F (37.9 °C) (Tympanic) O2 sat- 94%        --  Documentation assistance provided by shilpa Conklin for Dr Porter.  Information recorded by the scribe was done at my direction and has been verified and validated by me.     Bartolo Conklin  08/17/17 1434       Camilo Porter MD  08/17/17 1927

## 2017-08-17 NOTE — PROGRESS NOTES
Clinical Pharmacy Services: Medication History    Eden Woods is a 57 y.o. female presenting to [facility] with chief complaint of: Nausea, vomiting, and fever      Past Medical History:  Past Medical History:   Diagnosis Date   • Amaurosis fugax    • Antiphospholipid antibody positive    • Atrial premature complex    • Breast lump    • Cyst, breast    • Exogenous obesity    • Headache    • Hemianopsia    • Hiatal hernia    • Hx of bladder infections    • Hypothyroidism    • Migraine    • Nephrolithiasis    • Onset of menses     @ age 13 yo   • Pregnancy      3   • PVC's (premature ventricular contractions)    • Renal calculi    • Stress reaction    • Thrombocytopenia    • Thyroid disease    • Tinnitus    • Ventricular tachycardia    • Vertigo        Allergies   Allergen Reactions   • Heparin Other (See Comments)     Heparin induced thrombocytopenia   • Adhesive Tape Rash     Blisters   • Latex Rash     Blisters       Medication information was obtained from: Patient and patient's pharmacy    Pharmacy and Phone Number: [facility] [phone]    Medication notes:   1.Pt reported taking total daily dose of Warfarin 8.5 mg.   2. Per pt's records, pt is taking Hydrocortisone 10 mg, 2 tablets every morning and 1 tablet every night (total daily dose 30 mg).  Current Outpatient Medications:    Prior to Admission medications    Medication Sig Start Date End Date Taking? Authorizing Provider   aspirin 81 MG tablet Take 81 mg by mouth Daily. 1/5/15  Yes Historical Provider, MD   fludrocortisone 0.1 MG tablet Take 1 tablet by mouth Daily. 17  Yes Rah VAZQUEZ MD   hydrocortisone (CORTEF) 10 MG tablet Take 10 mg by mouth 2 (Two) Times a Day. Pt takes 20 mg every morning and 10 mg every night.   Yes Historical Provider, MD   levothyroxine (SYNTHROID) 100 MCG tablet Take 1 tablet by mouth Daily. 17 Yes Rah VAZQUEZ MD   omeprazole (priLOSEC) 20 MG capsule  Take 20 mg by mouth Every Night.   Yes Historical Provider, MD   valACYclovir (VALTREX) 500 MG tablet Take 500 mg by mouth 2 (Two) Times a Day As Needed (Fever blisters).   Yes Historical Provider, MD   hydrocortisone (CORTEF) 10 MG tablet 4 tabs in am 2 tabs at dinner time  Patient taking differently: 20 mg 2 (Two) Times a Day. 4 tabs in am 2 tabs at dinner time 1/30/17 8/17/17 Yes Rah VAZQUEZ MD   Insulin Pen Needle (BD PEN NEEDLE RASHID U/F) 32G X 4 MM misc Use as directed 5/23/16   Rah VAZQUEZ MD   liothyronine (CYTOMEL) 5 MCG tablet Take 1 tablet by mouth 2 (Two) Times a Day. 7/13/17   Rah VAZQUEZ MD   lisdexamfetamine (VYVANSE) 60 MG capsule Take 1 capsule by mouth Every Morning. 8/3/17   Lorne Lyle,    warfarin (COUMADIN) 1 MG tablet Take 1 mg by mouth Daily. Total daily dose of 8.5 mg. 1/5/15   Historical Provider, MD   warfarin (COUMADIN) 7.5 MG tablet Take 7.5 mg by mouth Daily. Total daily dose of 8.5 mg. 6/27/17   Historical Provider, MD       This medication list is complete to the best of my knowledge as of 8/17/2017    Please call if questions.    Meggan Keller, Pharmacy Intern

## 2017-08-17 NOTE — H&P
[facility]    Patient Identification:  Name: Eden Woods  Age: 57 y.o.  Sex: female  :  1960  MRN: [id number]         Primary Care Physician: Lorne Lyle DO    Chief complaint:  vomiting diarrhea    Subjective   Patient is a 57 y.o. female with a history of [place]'s disease around midnight and vomiting and then diarrhea about 4-5 times. Because of the vomiting she was not able to keep down/take her steroids. She also has abdominal cramps. She has in the past had vomiting and diarrhea and has required admission to the hospital because of her [place]'s. She reports fever throughout the night. No chills. No chest pain, shortness of breath, cough. No dysuria. She denies blood in her vomit, blood in her stool. She is on anticoagulation because of antiphospholipid antibody syndrome. Also has a history of DVT as well as stroke.    Past Medical History:   Diagnosis Date   • Amaurosis fugax    • Antiphospholipid antibody positive    • Atrial premature complex    • Breast lump    • Cyst, breast    • Exogenous obesity    • Headache    • Hemianopsia    • Hiatal hernia    • Hx of bladder infections    • Hypothyroidism    • Migraine    • Nephrolithiasis    • Onset of menses     @ age 15 yo   • Pregnancy      3   • PVC's (premature ventricular contractions)    • Renal calculi    • Stress reaction    • Thrombocytopenia    • Thyroid disease    • Tinnitus    • Ventricular tachycardia    • Vertigo      Past Surgical History:   Procedure Laterality Date   • COLONOSCOPY     • DILATATION AND CURETTAGE      d&C with CCK secondary to HPV in    • HYSTERECTOMY     • UPPER GASTROINTESTINAL ENDOSCOPY       Current medications reviewed.    Family History   Problem Relation Age of Onset   • Breast cancer Daughter    • Clotting disorder Other    • Hypothyroidism Other    • Hypertension Other      Social History   Substance Use Topics   • Smoking status: Never  Smoker   • Smokeless tobacco: None   • Alcohol use No     Review of Systems   Constitutional: Positive for fever. Negative for chills.   HENT: Negative.    Eyes: Negative.    Respiratory: Negative.  Negative for cough, chest tightness and shortness of breath.    Cardiovascular: Negative.  Negative for chest pain.   Gastrointestinal: Positive for abdominal pain, diarrhea, nausea and vomiting. Negative for blood in stool.   Endocrine: Negative.    Genitourinary: Negative.  Negative for dysuria.   Musculoskeletal: Negative.    Skin: Negative.    Allergic/Immunologic: Negative.    Neurological: Negative.    Hematological: Negative.    Psychiatric/Behavioral: Negative.       Objective      Vital Signs  Temp:  [98.6 °F (37 °C)-100.2 °F (37.9 °C)] 98.6 °F (37 °C)  Heart Rate:  [] 100  Resp:  [17-20] 18  BP: (110-137)/(62-94) 110/62  Body mass index is 33.99 kg/(m^2).    Physical Exam   Constitutional: She is oriented to person, place, and time. She appears well-developed and well-nourished.  Non-toxic appearance. She does not have a sickly appearance. She does not appear ill. No distress.   HENT:   Head: Normocephalic and atraumatic.   Eyes: Conjunctivae and EOM are normal.   Neck: Neck supple. No tracheal deviation present.   Cardiovascular: Normal rate, regular rhythm and intact distal pulses.    Pulses:       Radial pulses are 2+ on the right side, and 2+ on the left side.        Dorsalis pedis pulses are 2+ on the right side, and 2+ on the left side.   Pulmonary/Chest: Effort normal and breath sounds normal. No respiratory distress. She has no wheezes. She has no rales.   Abdominal: Soft. Bowel sounds are normal. She exhibits no distension. There is no tenderness. There is no rebound and no guarding.   Musculoskeletal: She exhibits no edema.   Lymphadenopathy:     She has no cervical adenopathy.   Neurological: She is alert and oriented to person, place, and time.   Skin: Skin is warm and dry.   Psychiatric: She  has a normal mood and affect. Her behavior is normal.     Lab Results   Component Value Date    WBC 6.11 08/17/2017    HGB 15.3 08/17/2017    HCT 47.9 (H) 08/17/2017     (L) 08/17/2017     Lab Results   Component Value Date     08/17/2017    K 4.1 08/17/2017     08/17/2017    CO2 21.3 (L) 08/17/2017    BUN 22 (H) 08/17/2017    CREATININE 1.02 (H) 08/17/2017    GLUCOSE 114 (H) 08/17/2017     Lab Results   Component Value Date    CALCIUM 8.2 (L) 08/17/2017     Lab Results   Component Value Date    AST 40 (H) 08/17/2017    ALT 29 08/17/2017    ALKPHOS 161 (H) 08/17/2017     Lab Results   Component Value Date    INR 2.95 (H) 08/17/2017     Lab Results   Component Value Date    COLORU Dark Yellow (A) 08/17/2017    CLARITYU Clear 08/17/2017    PHUR 6.0 08/17/2017    GLUCOSEU Negative 08/17/2017    KETONESU Negative 08/17/2017    BLOODU Negative 08/17/2017    LEUKOCYTESUR Moderate (2+) (A) 08/17/2017    BILIRUBINUR Negative 08/17/2017    UROBILINOGEN 1.0 E.U./dL 08/17/2017    RBCUA 0-2 08/17/2017    WBCUA 13-20 (A) 08/17/2017    BACTERIA None Seen 08/17/2017     Estimated Creatinine Clearance: 72.6 mL/min (by C-G formula based on Cr of 1.02).    Assessment/Plan   Active Hospital Problems (** Indicates Principal Problem)    Diagnosis Date Noted   • **Intractable vomiting with nausea [R11.2] 08/17/2017   • Diarrhea [R19.7] 08/17/2017   • Long term (current) use of anticoagulants [Z79.01] 08/17/2017   • Thrombocytopenia [D69.6] 08/17/2017   • Hypothyroidism (acquired) [E03.9] 03/01/2016   • Ethan's disease [E27.1] 02/04/2016   • Antiphospholipid syndrome [D68.61] 02/04/2016      Resolved Hospital Problems    Diagnosis Date Noted Date Resolved   No resolved problems to display.     · 57 y.o. female with a history of [place]'s admitted with probably a viral gastroenteritis  · I discussed with Dr. Donahue and Dr. Porter  · IV steroids, IV fluids  · Stool studies  · Continue Coumadin  · Thrombocytopenia  may be due to infection  · Discussed findings and recommendations with patient/. They were given the opportunity to ask questions. Further management as patient's clinical course evolves.    Valerio Aranda MD  08/17/17  5:14 PM

## 2017-08-17 NOTE — ED NOTES
Pt requesting pillow and ice chips.  Patient informed no pillows available and was given blankets to put under her knees.  Told she is NPO until test results come back per Dr. Porter.     Marla Santo RN  08/17/17 [id number]

## 2017-08-17 NOTE — CONSULTS
Patient examined and chart reviewed  #1 Viral syndrome  #2 gastroenteritis  #3 adrenal insufficiency  04 dehydration  #5 hypothyroidism    Patient started experiencing nausea and vomiting at about breakfast  this morning and subsequently diarrhea along with abdominal cramping  She noted partially digested food from last night in the vomitus  Patient received IV hydrocortisone in the emergency room  She will also receive 50 mg IV hydrocortisone every 8 hours   IV fluids to continue- patient wants no more than 100 cc an hour for fear of swelling  Will hold the fludrocortisone for now while she is getting saline IV  I discussed with the patient and family at bedside    I also discussed with Dr. Aranda

## 2017-08-17 NOTE — ED NOTES
Pt refusing CT scan and asking for something for her fever.  Dr. Porter to talk to patient.      Marla Santo RN  08/17/17 [id number]

## 2017-08-17 NOTE — CONSULTS
57 y.o.  Patient Care Team:  Lorne Lyle DO as PCP - General      Chief Complaint   Patient presents with   • Nausea   • Vomiting   • Fever       HPI patient is a 57-year-old white female with history of Ethan's disease and hypothyroidism was admitted to the hospital with symptoms of nausea and vomiting and diarrhea starting approximately 12 hours prior to admission.  She started the symptoms of nausea and vomiting and could not give her steroids down she also started experiencing abdominal symptoms especially cramps.  She was concerned about going into a dural crisis and came to the emergency room  Patient was given IV fluids and IV steroids.  The ER physician discussed with me and I recommended 50 mg hydrocortisone IV every 8 hours for now  Patient started feeling better after some IV fluids were given and the first dose of IV hydrocortisone    Patient has been compliant with her medication except when she was vomiting she would not give her medication down  Patient has bilateral adrenal hemorrhages after gallbladder surgery and sepsis and has been deemed primarily adrenal insufficiency       Past Medical History:   Diagnosis Date   • Amaurosis fugax    • Antiphospholipid antibody positive    • Atrial premature complex    • Breast lump    • Cyst, breast    • Exogenous obesity    • Headache    • Hemianopsia    • Hiatal hernia    • Hx of bladder infections    • Hypothyroidism    • Migraine    • Nephrolithiasis    • Onset of menses     @ age 15 yo   • Pregnancy      3   • PVC's (premature ventricular contractions)    • Renal calculi    • Stress reaction    • Thrombocytopenia    • Thyroid disease    • Tinnitus    • Ventricular tachycardia    • Vertigo        Family History   Problem Relation Age of Onset   • Breast cancer Daughter    • Clotting disorder Other    • Hypothyroidism Other    • Hypertension Other        Social History     Social History   • Marital status:      Spouse name: N/A   •  Number of children: N/A   • Years of education: N/A     Occupational History   • Not on file.     Social History Main Topics   • Smoking status: Never Smoker   • Smokeless tobacco: Not on file   • Alcohol use No   • Drug use: No   • Sexual activity: Defer     Other Topics Concern   • Not on file     Social History Narrative   • No narrative on file       Allergies   Allergen Reactions   • Heparin Other (See Comments)     Heparin induced thrombocytopenia   • Adhesive Tape Rash     Blisters   • Latex Rash     Blisters         Current Facility-Administered Medications:   •  Insert peripheral IV, , , Once **AND** sodium chloride 0.9 % flush 10 mL, 10 mL, Intravenous, PRN, Camilo Porter MD  •  sodium chloride 0.9 % infusion, 125 mL/hr, Intravenous, Continuous, Camilo Porter MD, Last Rate: 125 mL/hr at 08/17/17 1339, 125 mL/hr at 08/17/17 1339         Review of Systems   Constitutional: Positive for diaphoresis, fatigue and fever.   HENT: Negative for sore throat, trouble swallowing and voice change.    Eyes: Negative.    Respiratory: Negative.    Cardiovascular: Negative.    Gastrointestinal: Positive for abdominal distention, abdominal pain, diarrhea, nausea and vomiting.   Endocrine: Negative.    Genitourinary: Negative.    Musculoskeletal: Negative.    Skin: Negative.    Neurological: Positive for dizziness, weakness, light-headedness and headaches.   Psychiatric/Behavioral: Negative.    All other systems reviewed and are negative.    Objective     Vital Signs  Temp:  [98.6 °F (37 °C)-100.2 °F (37.9 °C)] 98.6 °F (37 °C)  Heart Rate:  [] 100  Resp:  [17-20] 18  BP: (110-137)/(62-94) 110/62    Physical Exam  Physical Exam   Constitutional: She is oriented to person, place, and time. She appears well-developed and well-nourished.   Eyes: EOM are normal. Pupils are equal, round, and reactive to light.   Neck: Normal range of motion. Neck supple. No thyromegaly present.   Cardiovascular: Normal rate,  regular rhythm, normal heart sounds and intact distal pulses.    Pulmonary/Chest: Effort normal and breath sounds normal.   Abdominal: Soft. Bowel sounds are normal. She exhibits distension. There is tenderness.   Musculoskeletal: Normal range of motion. She exhibits no edema.   Neurological: She is alert and oriented to person, place, and time.   Skin: Skin is warm and dry. No rash noted.   Psychiatric: She has a normal mood and affect. Her behavior is normal.   Nursing note and vitals reviewed.      Results Review:    I reviewed the patient's new clinical results.  No results found for: POCGLU  Lab Results (last 72 hours)     Procedure Component Value Units Date/Time    Blood Culture [[id number]] Collected:  08/17/17 1122    Specimen:  Blood from Hand, Right Updated:  08/17/17 1129    CBC & Differential [[id number]] Collected:  08/17/17 1122    Specimen:  Blood Updated:  08/17/17 1134    Narrative:       The following orders were created for panel order CBC & Differential.  Procedure                               Abnormality         Status                     ---------                               -----------         ------                     CBC Auto Differential[[id number]]        Abnormal            Final result                 Please view results for these tests on the individual orders.    CBC Auto Differential [[id number]]  (Abnormal) Collected:  08/17/17 1122    Specimen:  Blood Updated:  08/17/17 1134     WBC 6.11 10*3/mm3      RBC 5.41 (H) 10*6/mm3      Hemoglobin 15.3 g/dL      Hematocrit 47.9 (H) %      MCV 88.5 fL      MCH 28.3 pg      MCHC 31.9 (L) g/dL      RDW 14.1 (H) %      RDW-SD 45.6 fl      MPV 10.9 fL      Platelets 109 (L) 10*3/mm3      Neutrophil % 75.1 %      Lymphocyte % 16.5 (L) %      Monocyte % 4.3 (L) %      Eosinophil % 3.4 %      Basophil % 0.2 %      Immature Grans % 0.5 %      Neutrophils, Absolute 4.59 10*3/mm3      Lymphocytes, Absolute 1.01 10*3/mm3      Monocytes, Absolute 0.26  10*3/mm3      Eosinophils, Absolute 0.21 10*3/mm3      Basophils, Absolute 0.01 10*3/mm3      Immature Grans, Absolute 0.03 10*3/mm3     Protime-INR [[id number]]  (Abnormal) Collected:  08/17/17 1122    Specimen:  Blood Updated:  08/17/17 1141     Protime 29.8 (H) Seconds      INR 2.95 (H)    Lipase [[id number]]  (Normal) Collected:  08/17/17 1122    Specimen:  Blood Updated:  08/17/17 1204     Lipase 59 U/L     Blood Culture [[id number]] Collected:  08/17/17 1249    Specimen:  Blood from Arm, Left Updated:  08/17/17 1254    Lactic Acid, Plasma [[id number]]  (Normal) Collected:  08/17/17 1249    Specimen:  Blood Updated:  08/17/17 1320     Lactate 0.8 mmol/L     Comprehensive Metabolic Panel [[id number]]  (Abnormal) Collected:  08/17/17 1249    Specimen:  Blood Updated:  08/17/17 1326     Glucose 114 (H) mg/dL      BUN 22 (H) mg/dL      Creatinine 1.02 (H) mg/dL      Sodium 139 mmol/L      Potassium 4.1 mmol/L      Chloride 105 mmol/L      CO2 21.3 (L) mmol/L      Calcium 8.2 (L) mg/dL      Total Protein 6.0 g/dL      Albumin 3.40 (L) g/dL      ALT (SGPT) 29 U/L      AST (SGOT) 40 (H) U/L      Alkaline Phosphatase 161 (H) U/L      Total Bilirubin 0.5 mg/dL      eGFR Non African Amer 56 (L) mL/min/1.73      Globulin 2.6 gm/dL      A/G Ratio 1.3 g/dL      BUN/Creatinine Ratio 21.6     Anion Gap 12.7 mmol/L     Urine Culture [[id number]] Collected:  08/17/17 1016    Specimen:  Urine from Urine, Clean Catch Updated:  08/17/17 1552    Urinalysis With / Culture If Indicated [[id number]]  (Abnormal) Collected:  08/17/17 1016    Specimen:  Urine from Urine, Clean Catch Updated:  08/17/17 1608     Color, UA Dark Yellow (A)     Appearance, UA Clear     pH, UA 6.0     Specific Gravity, UA 1.027     Glucose, UA Negative     Ketones, UA Negative     Bilirubin, UA Negative     Blood, UA Negative     Protein, UA Trace (A)     Leuk Esterase, UA Moderate (2+) (A)     Nitrite, UA Negative     Urobilinogen, UA 1.0 E.U./dL    Urinalysis,  Microscopic Only [[id number]]  (Abnormal) Collected:  08/17/17 1016    Specimen:  Urine from Urine, Clean Catch Updated:  08/17/17 1608     RBC, UA 0-2 /HPF      WBC, UA 13-20 (A) /HPF      Bacteria, UA None Seen /HPF      Squamous Epithelial Cells, UA 0-2 /HPF      Hyaline Casts, UA 0-2 /LPF      Methodology Automated Microscopy          Imaging Results (last 72 hours)     ** No results found for the last 72 hours. **          Assessment/Plan     Patient Active Problem List   Diagnosis   • Ethan's disease   • Chronic adrenal insufficiency   • Antiphospholipid syndrome   • Dyskinesia of esophagus   • Simple obesity   • Fatigue   • Gastroesophageal reflux disease   • Loss of hair   • Herpes simplex type 1 infection   • Mineralocorticoid deficiency   • Muscle weakness   • Menopausal and postmenopausal disorder   • Renal insufficiency   • Emotional stress reaction   • Cerebrovascular accident   • Hypothyroidism (acquired)   • Deep vein thrombosis   • Erosive esophagitis   • Abnormal weight gain   • Intractable vomiting with nausea   • Diarrhea   • Long term (current) use of anticoagulants   • Thrombocytopenia   • Gastroenteritis       [place]'s disease  History of bilateral adrenal insufficiency/hemorrhage  Hypothyroidism  Nausea and vomiting and diarrhea  Dehydration    Patient examined and chart reviewed  #1 Viral syndrome  #2 gastroenteritis  #3 adrenal insufficiency  04 dehydration  #5 hypothyroidism     Patient started experiencing nausea and vomiting at about breakfast  this morning and subsequently diarrhea along with abdominal cramping  She noted partially digested food from last night in the vomitus  Patient received IV hydrocortisone in the emergency room  She will also receive 50 mg IV hydrocortisone every 8 hours   IV fluids to continue- patient wants no more than 100 cc an hour for fear of swelling  Will hold the fludrocortisone for now while she is getting saline IV  I discussed with the patient and  "family at bedside     I also discussed with Dr. Aranda    The total time spent for old record and lab review and floor time was more than 110 min of which greater than 60 min of time was spent on counseling the patient on recommended evaluation and treatment options, instructions for management/treatment and /or follow up  and importance of compliance with chosen management or treatment options  Rah Donahue MD FACE.  08/17/17  4:36 PM        EMR Dragon / transcription disclaimer:     \"Dictated utilizing Dragon dictation\".   "

## 2017-08-17 NOTE — ED TRIAGE NOTES
Patients MD office called and reported n/v with a fever. They did not see patient but sent her to ED for eval.

## 2017-08-17 NOTE — ED NOTES
Lab called to tell RN that she could not get the lactic acid on first stick but will try to get it when she comes back to draw another blood culture.  MD aware.      Marla Santo RN  08/17/17 [id number]

## 2017-08-17 NOTE — PLAN OF CARE
Problem: Patient Care Overview (Adult)  Goal: Plan of Care Review  Outcome: Ongoing (interventions implemented as appropriate)    08/17/17 1945   Coping/Psychosocial Response Interventions   Plan Of Care Reviewed With patient;spouse   Patient Care Overview   Progress improving   Outcome Evaluation   Outcome Summary/Follow up Plan Zofran given for nausea. No emesis noted. Denies any diarrhea since arrival to floor. IVF infusing without difficulty. Gloria clear liq diet well.        Goal: Adult Individualization and Mutuality  Outcome: Ongoing (interventions implemented as appropriate)  Goal: Discharge Needs Assessment  Outcome: Ongoing (interventions implemented as appropriate)

## 2017-08-17 NOTE — PLAN OF CARE
Problem: Fluid Volume Deficit (Adult)  Goal: Identify Related Risk Factors and Signs and Symptoms  Outcome: Outcome(s) achieved Date Met:  08/17/17 08/17/17 1911   Fluid Volume Deficit   Fluid Volume Deficit: Related Risk Factors vomiting/diarrhea   Signs and Symptoms (Fluid Volume Deficit) body temperature changes;nausea/vomiting, anorexia, diarrhea complaints       Goal: Fluid/Electrolyte Balance  Outcome: Ongoing (interventions implemented as appropriate)  Goal: Comfort/Well Being  Outcome: Ongoing (interventions implemented as appropriate)    Problem: Diarrhea (Adult)  Goal: Identify Related Risk Factors and Signs and Symptoms  Outcome: Outcome(s) achieved Date Met:  08/17/17  Goal: Improved/Reduced Symptoms  Outcome: Ongoing (interventions implemented as appropriate)    Problem: Infection, Risk/Actual (Adult)  Goal: Identify Related Risk Factors and Signs and Symptoms  Outcome: Outcome(s) achieved Date Met:  08/17/17  Goal: Infection Prevention/Resolution  Outcome: Ongoing (interventions implemented as appropriate)

## 2017-08-18 VITALS
HEART RATE: 77 BPM | BODY MASS INDEX: 33.85 KG/M2 | WEIGHT: 210.6 LBS | DIASTOLIC BLOOD PRESSURE: 70 MMHG | TEMPERATURE: 98.5 F | OXYGEN SATURATION: 97 % | RESPIRATION RATE: 18 BRPM | SYSTOLIC BLOOD PRESSURE: 116 MMHG | HEIGHT: 66 IN

## 2017-08-18 PROBLEM — K52.9 GASTROENTERITIS: Status: ACTIVE | Noted: 2017-08-18

## 2017-08-18 LAB
ALBUMIN SERPL-MCNC: 3 G/DL (ref 3.5–5.2)
ALBUMIN/GLOB SERPL: 1 G/DL
ALP SERPL-CCNC: 150 U/L (ref 39–117)
ALT SERPL W P-5'-P-CCNC: 32 U/L (ref 1–33)
ANION GAP SERPL CALCULATED.3IONS-SCNC: 13.3 MMOL/L
AST SERPL-CCNC: 35 U/L (ref 1–32)
BACTERIA SPEC AEROBE CULT: NORMAL
BILIRUB SERPL-MCNC: 0.4 MG/DL (ref 0.1–1.2)
BUN BLD-MCNC: 14 MG/DL (ref 6–20)
BUN/CREAT SERPL: 16.5 (ref 7–25)
CALCIUM SPEC-SCNC: 8.2 MG/DL (ref 8.6–10.5)
CHLORIDE SERPL-SCNC: 108 MMOL/L (ref 98–107)
CO2 SERPL-SCNC: 20.7 MMOL/L (ref 22–29)
CREAT BLD-MCNC: 0.85 MG/DL (ref 0.57–1)
DEPRECATED RDW RBC AUTO: 45.2 FL (ref 37–54)
ERYTHROCYTE [DISTWIDTH] IN BLOOD BY AUTOMATED COUNT: 13.9 % (ref 11.7–13)
GFR SERPL CREATININE-BSD FRML MDRD: 69 ML/MIN/1.73
GLOBULIN UR ELPH-MCNC: 2.9 GM/DL
GLUCOSE BLD-MCNC: 108 MG/DL (ref 65–99)
HCT VFR BLD AUTO: 38.1 % (ref 35.6–45.5)
HGB BLD-MCNC: 12.2 G/DL (ref 11.9–15.5)
INR PPP: 3.07 (ref 0.9–1.1)
MCH RBC QN AUTO: 28.2 PG (ref 26.9–32)
MCHC RBC AUTO-ENTMCNC: 32 G/DL (ref 32.4–36.3)
MCV RBC AUTO: 88.2 FL (ref 80.5–98.2)
PLATELET # BLD AUTO: 117 10*3/MM3 (ref 140–500)
PMV BLD AUTO: 11.5 FL (ref 6–12)
POTASSIUM BLD-SCNC: 4.3 MMOL/L (ref 3.5–5.2)
PROT SERPL-MCNC: 5.9 G/DL (ref 6–8.5)
PROTHROMBIN TIME: 30.8 SECONDS (ref 11.7–14.2)
RBC # BLD AUTO: 4.32 10*6/MM3 (ref 3.9–5.2)
SODIUM BLD-SCNC: 142 MMOL/L (ref 136–145)
WBC NRBC COR # BLD: 5.19 10*3/MM3 (ref 4.5–10.7)

## 2017-08-18 PROCEDURE — 85027 COMPLETE CBC AUTOMATED: CPT | Performed by: HOSPITALIST

## 2017-08-18 PROCEDURE — G0378 HOSPITAL OBSERVATION PER HR: HCPCS

## 2017-08-18 PROCEDURE — 85610 PROTHROMBIN TIME: CPT | Performed by: HOSPITALIST

## 2017-08-18 PROCEDURE — 25010000002 HYDROCORTISONE SODIUM SUCCINATE 100 MG RECONSTITUTED SOLUTION: Performed by: HOSPITALIST

## 2017-08-18 PROCEDURE — 80053 COMPREHEN METABOLIC PANEL: CPT | Performed by: HOSPITALIST

## 2017-08-18 PROCEDURE — 82024 ASSAY OF ACTH: CPT | Performed by: INTERNAL MEDICINE

## 2017-08-18 PROCEDURE — 96376 TX/PRO/DX INJ SAME DRUG ADON: CPT

## 2017-08-18 RX ORDER — HYDROCORTISONE 10 MG/1
10 TABLET ORAL 2 TIMES DAILY
Start: 2017-08-18 | End: 2018-08-06 | Stop reason: SDUPTHER

## 2017-08-18 RX ADMIN — LEVOTHYROXINE SODIUM 100 MCG: 100 TABLET ORAL at 06:38

## 2017-08-18 RX ADMIN — HYDROCORTISONE SODIUM SUCCINATE 50 MG: 100 INJECTION, POWDER, FOR SOLUTION INTRAMUSCULAR; INTRAVENOUS at 01:41

## 2017-08-18 RX ADMIN — PANTOPRAZOLE SODIUM 40 MG: 40 TABLET, DELAYED RELEASE ORAL at 06:32

## 2017-08-18 RX ADMIN — ASPIRIN 81 MG: 81 TABLET ORAL at 08:25

## 2017-08-18 RX ADMIN — LIOTHYRONINE SODIUM 5 MCG: 5 TABLET ORAL at 08:25

## 2017-08-18 RX ADMIN — HYDROCORTISONE SODIUM SUCCINATE 50 MG: 100 INJECTION, POWDER, FOR SOLUTION INTRAMUSCULAR; INTRAVENOUS at 12:24

## 2017-08-18 NOTE — PLAN OF CARE
Problem: Patient Care Overview (Adult)  Goal: Plan of Care Review  Outcome: Ongoing (interventions implemented as appropriate)    08/17/17 1945 08/18/17 0344   Coping/Psychosocial Response Interventions   Plan Of Care Reviewed With --  patient   Patient Care Overview   Progress improving --    Outcome Evaluation   Outcome Summary/Follow up Plan --  Pt has no complaints of pain. NS at 100ml/hr. IV steroids. VSS. Continue to monitor. Safety maintained.          Problem: Fluid Volume Deficit (Adult)  Goal: Fluid/Electrolyte Balance  Outcome: Ongoing (interventions implemented as appropriate)  Goal: Comfort/Well Being  Outcome: Ongoing (interventions implemented as appropriate)    Problem: Diarrhea (Adult)  Goal: Improved/Reduced Symptoms  Outcome: Ongoing (interventions implemented as appropriate)    Problem: Infection, Risk/Actual (Adult)  Goal: Infection Prevention/Resolution  Outcome: Ongoing (interventions implemented as appropriate)

## 2017-08-18 NOTE — PLAN OF CARE
Problem: Patient Care Overview (Adult)  Goal: Plan of Care Review  Outcome: Outcome(s) achieved Date Met:  08/18/17 08/18/17 1201   Coping/Psychosocial Response Interventions   Plan Of Care Reviewed With patient   Patient Care Overview   Progress improving   Outcome Evaluation   Outcome Summary/Follow up Plan Pt feeling better. No further nausea or vomiting or diarrhea. Plan home today.       Goal: Adult Individualization and Mutuality  Outcome: Outcome(s) achieved Date Met:  08/18/17  Goal: Discharge Needs Assessment  Outcome: Outcome(s) achieved Date Met:  08/18/17    Problem: Fluid Volume Deficit (Adult)  Goal: Fluid/Electrolyte Balance  Outcome: Outcome(s) achieved Date Met:  08/18/17  Goal: Comfort/Well Being  Outcome: Outcome(s) achieved Date Met:  08/18/17    Problem: Diarrhea (Adult)  Goal: Improved/Reduced Symptoms  Outcome: Outcome(s) achieved Date Met:  08/18/17    Problem: Infection, Risk/Actual (Adult)  Goal: Infection Prevention/Resolution  Outcome: Outcome(s) achieved Date Met:  08/18/17

## 2017-08-18 NOTE — DISCHARGE SUMMARY
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      Consulting Physician(s)     Provider Relationship    Rah VAZQUEZ MD Consulting Physician        Hospital Course  Please see history and physical for details. Patient is a 57 y.o. female with a history of Miller's disease admitted for nausea vomiting diarrhea. She was not able to keep down/take her steroids. She was admitted and started on fluids and IV steroids. Today her diarrhea has improved. She is passing gas and is tolerating by mouth. Family members at home are now sick with similar illness. She was seen in consultation by her endocrinologist here. She'll be discharged home on double her steroid dose for the next 5 days.    Condition on Discharge: Improved, she would like to go home today.    Temp:  [98.4 °F (36.9 °C)-100.2 °F (37.9 °C)] 98.5 °F (36.9 °C)  Heart Rate:  [] 77  Resp:  [16-18] 18  BP: (109-125)/(49-74) 116/70  Body mass index is 33.99 kg/(m^2).    Physical Exam   Constitutional: She is oriented to person, place, and time. No distress.   Cardiovascular: Normal rate and regular rhythm.    Pulmonary/Chest: Effort normal and breath sounds normal. No respiratory distress.   Abdominal: Soft. There is no tenderness. There is no rebound and no guarding.   Neurological: She is  alert and oriented to person, place, and time.     Discharge Medications   [facility]   Home Medication Instructions MORGAN:[id number]    Printed on:08/18/17 120   Medication Information                      aspirin 81 MG tablet  Take 81 mg by mouth Daily.             fludrocortisone 0.1 MG tablet  Take 1 tablet by mouth Daily.             hydrocortisone (CORTEF) 10 MG tablet  Take 1 tablet by mouth 2 (Two) Times a Day. Pt takes 20 mg every morning and 10 mg every night.  (double this for the next 5 days 08/18/17)             levothyroxine (SYNTHROID) 100 MCG tablet  Take 1 tablet by mouth Daily.             liothyronine (CYTOMEL) 5 MCG tablet  Take 1 tablet by mouth 2 (Two) Times a Day.             lisdexamfetamine (VYVANSE) 60 MG capsule  Take 1 capsule by mouth Every Morning.             omeprazole (priLOSEC) 20 MG capsule  Take 20 mg by mouth Every Night.             valACYclovir (VALTREX) 500 MG tablet  Take 500 mg by mouth 2 (Two) Times a Day As Needed (Fever blisters).             warfarin (COUMADIN) 1 MG tablet  Take 1 mg by mouth Daily. Total daily dose of 8.5 mg.             warfarin (COUMADIN) 7.5 MG tablet  Take 7.5 mg by mouth Daily. Total daily dose of 8.5 mg.                Diet Instructions     Diet:       Diet Texture / Consistency:  Regular                Activity Instructions     Activity as Tolerated                    Additional Instructions for the Follow-ups that You Need to Schedule     Call MD for problems / concerns.    As directed              Follow-up Information     Follow up with Lorne Lyle, DO Follow up in 2 week(s).    Specialty:  Family Medicine    Contact information:    [address] [facility] [address] [phone]          Follow up with Rah Donahue MD .    Specialty:  Endocrinology    Contact information:    [address] [address] [phone]          Test Results Pending at Discharge   Order Current Status    ACTH  In process    Blood Culture Preliminary result    Blood Culture Preliminary result         Valerio Aranda MD  08/18/17  12:08 PM    Discharge time spent greater than 30 minutes.

## 2017-08-21 LAB — ACTH PLAS-MCNC: 5.2 PG/ML (ref 7.2–63.3)

## 2017-08-21 RX ORDER — VALACYCLOVIR HYDROCHLORIDE 500 MG/1
TABLET, FILM COATED ORAL
Qty: 68 TABLET | Refills: 0 | Status: SHIPPED | OUTPATIENT
Start: 2017-08-21 | End: 2017-10-04 | Stop reason: SDUPTHER

## 2017-08-22 LAB
BACTERIA SPEC AEROBE CULT: NORMAL
BACTERIA SPEC AEROBE CULT: NORMAL

## 2017-09-29 ENCOUNTER — TELEPHONE (OUTPATIENT)
Dept: ENDOCRINOLOGY | Age: 57
End: 2017-09-29

## 2017-09-29 NOTE — TELEPHONE ENCOUNTER
PATIENT CALLED OFFICE CONCERNING EXTENSIVE DENTAL SURGERY, SHE NEEDED TO KNOW WHAT MEDICATION CHANGES WOULD NEED TO BE MADE.     PER DR. BRUCE SHE WILL NEED TO DOUBLE HER DOSE OF HYDROCORTISONE FROM 1 DAY BEFORE TO 3 DAYS AFTER.    PATIENT NOTIFIED.

## 2017-10-04 ENCOUNTER — OFFICE VISIT (OUTPATIENT)
Dept: FAMILY MEDICINE CLINIC | Facility: CLINIC | Age: 57
End: 2017-10-04

## 2017-10-04 VITALS
OXYGEN SATURATION: 99 % | TEMPERATURE: 98.5 F | WEIGHT: 209 LBS | DIASTOLIC BLOOD PRESSURE: 80 MMHG | HEIGHT: 66 IN | HEART RATE: 111 BPM | BODY MASS INDEX: 33.59 KG/M2 | SYSTOLIC BLOOD PRESSURE: 120 MMHG

## 2017-10-04 DIAGNOSIS — F90.2 ATTENTION DEFICIT HYPERACTIVITY DISORDER (ADHD), COMBINED TYPE: Primary | ICD-10-CM

## 2017-10-04 PROCEDURE — 99213 OFFICE O/P EST LOW 20 MIN: CPT | Performed by: FAMILY MEDICINE

## 2017-10-04 RX ORDER — AMOXICILLIN 875 MG/1
TABLET, COATED ORAL
COMMUNITY
Start: 2017-09-29 | End: 2017-12-12

## 2017-10-04 NOTE — PROGRESS NOTES
Subjective   Eden Woods is a 57 y.o. female with   Chief Complaint   Patient presents with   • ADHD   .    History of Present Illness     Patient presents today for follow up of ADHD.  Patient currently uses Vyvanse with success to control symptoms of ADHD.  She is able to concentrate and focus on tasks appropriately.  She denies having any side effects with using Vyvanse.  Patient states that she is able to complete task in orderly fashion and is less easily distracted.  There are no side effects such as tremor, tachyarrhythmia, insomnia or decreased appetite.    The following portions of the patient's history were reviewed and updated as appropriate: allergies, current medications, past family history, past medical history, past social history, past surgical history and problem list.    Review of Systems   Psychiatric/Behavioral: Positive for decreased concentration (ADHD).   All other systems reviewed and are negative.      Objective     Vitals:    10/04/17 1435   BP: 120/80   Pulse: 111   Temp: 98.5 °F (36.9 °C)   SpO2: 99%     BP Readings from Last 3 Encounters:   10/04/17 120/80   08/18/17 116/70   07/12/17 124/82      Wt Readings from Last 3 Encounters:   10/04/17 209 lb (94.8 kg)   08/17/17 210 lb 9.6 oz (95.5 kg)   07/12/17 209 lb (94.8 kg)        Physical Exam   Constitutional: She is oriented to person, place, and time. She appears well-developed and well-nourished.   HENT:   Head: Normocephalic and atraumatic.   Pulmonary/Chest: Effort normal.   Neurological: She is alert and oriented to person, place, and time.   Skin: Skin is warm and dry.   Psychiatric: She has a normal mood and affect. Her speech is normal and behavior is normal. Judgment and thought content normal. Cognition and memory are normal.   Nursing note and vitals reviewed.      Assessment/Plan   Eden was seen today for adhd.    Diagnoses and all orders for this visit:    Attention deficit hyperactivity disorder (ADHD), combined  type    Other orders  -     Discontinue: lisdexamfetamine (VYVANSE) 60 MG capsule; Take 1 capsule by mouth Every Morning.  -     lisdexamfetamine (VYVANSE) 60 MG capsule; Take 1 capsule by mouth Every Morning. Do not fill until November 2 2017        Return in about 4 months (around 2/4/2018).        Scribed for Lorne Lyle MD by Star Horowitz. 10/04/2017    I, Lorne Lyle MD personally performed the services described in this documentation, as scribed by Star Horowitz in my presence, and it is both accurate and complete

## 2017-10-31 ENCOUNTER — RESULTS ENCOUNTER (OUTPATIENT)
Dept: ENDOCRINOLOGY | Age: 57
End: 2017-10-31

## 2017-10-31 DIAGNOSIS — E27.40 CHRONIC ADRENAL INSUFFICIENCY (HCC): Primary | ICD-10-CM

## 2017-10-31 DIAGNOSIS — E03.9 HYPOTHYROIDISM (ACQUIRED): ICD-10-CM

## 2017-10-31 DIAGNOSIS — E27.40 CHRONIC ADRENAL INSUFFICIENCY (HCC): ICD-10-CM

## 2017-11-02 LAB
25(OH)D3+25(OH)D2 SERPL-MCNC: 105.7 NG/ML (ref 30–100)
ACTH PLAS-MCNC: 16.8 PG/ML (ref 7.2–63.3)
ALBUMIN SERPL-MCNC: 4.3 G/DL (ref 3.5–5.2)
ALBUMIN/GLOB SERPL: 1.6 G/DL
ALP SERPL-CCNC: 191 U/L (ref 39–117)
ALT SERPL-CCNC: 17 U/L (ref 1–33)
AST SERPL-CCNC: 18 U/L (ref 1–32)
BILIRUB SERPL-MCNC: 0.3 MG/DL (ref 0.1–1.2)
BUN SERPL-MCNC: 14 MG/DL (ref 6–20)
BUN/CREAT SERPL: 11.7 (ref 7–25)
CALCIUM SERPL-MCNC: 9 MG/DL (ref 8.6–10.5)
CHLORIDE SERPL-SCNC: 103 MMOL/L (ref 98–107)
CO2 SERPL-SCNC: 25.9 MMOL/L (ref 22–29)
CORTIS AM PEAK SERPL-MCNC: 29.6 UG/DL (ref 6.2–19.4)
CREAT SERPL-MCNC: 1.2 MG/DL (ref 0.57–1)
GFR SERPLBLD CREATININE-BSD FMLA CKD-EPI: 47 ML/MIN/1.73
GFR SERPLBLD CREATININE-BSD FMLA CKD-EPI: 57 ML/MIN/1.73
GLOBULIN SER CALC-MCNC: 2.7 GM/DL
GLUCOSE SERPL-MCNC: 93 MG/DL (ref 65–99)
POTASSIUM SERPL-SCNC: 4.3 MMOL/L (ref 3.5–5.2)
PROT SERPL-MCNC: 7 G/DL (ref 6–8.5)
SODIUM SERPL-SCNC: 143 MMOL/L (ref 136–145)
T3 SERPL-MCNC: 120.9 NG/DL (ref 80–200)
T4 FREE SERPL-MCNC: 1.34 NG/DL (ref 0.93–1.7)
TSH SERPL DL<=0.005 MIU/L-ACNC: 0.32 MIU/ML (ref 0.27–4.2)

## 2017-11-09 RX ORDER — VALACYCLOVIR HYDROCHLORIDE 500 MG/1
TABLET, FILM COATED ORAL
Qty: 68 TABLET | Refills: 2 | Status: SHIPPED | OUTPATIENT
Start: 2017-11-09 | End: 2018-06-28 | Stop reason: SDUPTHER

## 2017-12-12 ENCOUNTER — OFFICE VISIT (OUTPATIENT)
Dept: ENDOCRINOLOGY | Age: 57
End: 2017-12-12

## 2017-12-12 VITALS
WEIGHT: 214 LBS | BODY MASS INDEX: 34.39 KG/M2 | HEIGHT: 66 IN | SYSTOLIC BLOOD PRESSURE: 118 MMHG | DIASTOLIC BLOOD PRESSURE: 82 MMHG | HEART RATE: 95 BPM

## 2017-12-12 DIAGNOSIS — R53.82 CHRONIC FATIGUE: ICD-10-CM

## 2017-12-12 DIAGNOSIS — E27.1 ADDISON'S DISEASE (HCC): ICD-10-CM

## 2017-12-12 DIAGNOSIS — R63.5 ABNORMAL WEIGHT GAIN: ICD-10-CM

## 2017-12-12 DIAGNOSIS — E27.40 CHRONIC ADRENAL INSUFFICIENCY (HCC): Primary | ICD-10-CM

## 2017-12-12 DIAGNOSIS — E27.49 MINERALOCORTICOID DEFICIENCY (HCC): ICD-10-CM

## 2017-12-12 DIAGNOSIS — E03.9 HYPOTHYROIDISM (ACQUIRED): ICD-10-CM

## 2017-12-12 PROCEDURE — 99214 OFFICE O/P EST MOD 30 MIN: CPT | Performed by: INTERNAL MEDICINE

## 2017-12-12 NOTE — PROGRESS NOTES
57 y.o.    Patient Care Team:  Lorne Lyle DO as PCP - General    Chief Complaint:      F/U [facility]'S DISEASE.  HYPOTHYROIDISM.  LABS DONE 11/01    Subjective     HPI Patient is a 57-year-old white female with a past history of hypothyroidism, adrenal insufficiency secondary to bilateral adrenal hemorrhages came for followup.  Primary adrenal insufficiency.  Patient is currently taking hydrocortisone 20 mg in the morning and 10-20 mg in the afternoon.  She reports compliance with the medication.  She denies any side effects.  Mineralocorticoid deficiency.  Patient is currently taking 0.1 mg fludrocortisone daily.  She denies any side effects.  She denies any swelling of the legs or hypokalemia. Symptoms.  Abnormal weight gain.  Patient currently weighs 214 pounds with a BMI of 34.6.  Hypothyroidism.  Patient is currently taking levothyroxine 125 mcg along with Cytomel 5 mcg twice daily.  She reports compliance with the medication and denies any side effects.       Interval History      SUMMARY  Patient was initially seen by me during her hospitalization in July 2014 when she was admitted after sepsis relating to cholecystectomy. She was in the hospital for nearly 3 weeks and apparently her thyroid medication was not continued at that time. So when I evaluated her she was clearly hypothyroid as well as hypotensive with extreme fatigue and dizziness and needing frequent IV fluids.  She was given IV hydrocortisone and Synthroid IV and she recovered remarkably well. Postsurgical recovery she insisted on staying on [place] Thyroid from her primary care physician.  She was transitioned to hydrocortisone and she was taking 20 mg in the morning and 10 mg in the evening but subsequently was tapered to 10 mg twice a day. Currently she is taking 7.5 mg in the morning and 2.5 mg in the evening.  She denies any dizziness or fatigue she is able to continue her activities of daily living without any problem. She denied any  nausea or abdominal symptoms.she was considered to have a secondary adrenal insufficiency most likely relating to the adrenal hemorrhage that was observed on the CT scan during her initial surgery.  Patient has not had her thyroid checked recent times.  She reports significant hair loss for the past several months especially since surgery. And is very concerned about it   weight gain  Patient actually gained about 7 pounds in the past 3 months despite tapering hydrocortisone dose. She also reports that her blood pressure is always low, systolic is typically less than 100, all her life.  hyperpigmentation of the skin  patient as well as her  reported that her hands seem to be getting darker over the past several months. She does not tan easily, and upon exposure to sun she turns red.she is concerned about Ethan's disease.  she denied any scar hyperpigmentation other than the latest a scar from August 2014.  subsequent evaluation revealed that her ACT H levels were in the 1600 range and patient was also severely hypokalemic which led to the hospitalization and treatment. She was put on high-dose hydrocortisone as well as Florinef and she recovered remarkably well.  Currently patient is taking 20 g hydro-cortisone in the morning and 10 mg in the evening along with fludrocortisone 0.1 mg daily. She is also on [place] Thyroid  for the past several weeks patient reported that her skin changes of hyperpigmentation have resolved completely. Her energy levels are back and she feels excellent except for weight gain  Patient and her  reported that her daughter passed away from cancer approximately 4 weeks ago and they're learning to cope with it.  Over the past 3-4 months have been a stressful in her life and she was also admitted in the hospital with adrenal insufficiency late August 2015.  At the time of discharge she was advised to continue 40 mg daily which she gradually decreased to 20 mg but then increased  it back to 30 mg for the past 3 weeks  She has been compliant with fludrocortisone and also [place] Thyroid  Patient denied any recurrence of hyperpigmentation that she has noticed in the past. She does report to excess fatigue. No dizziness reported no nausea. No diarrhea.  Interval history  In the previous visit due to extreme stress that the patient was undergoing-since having lost her daughter I recommended that she increase the hydrocortisone to 60 mg daily-30 mg at breakfast, 20 mg at lunch, 10 mg at 4 PM.  Patient reported that she has felt significantly improved but was gaining a lot of weight and was not happy about it so she decided on her own to decrease the dose 2 weeks ago to 50 ng for 1 week and subsequently 40 mg for the past one week-she is currently taking 20 minute grams in the morning and 10 mg at lunch and dinner. Next then she reports that she is still feeling better with this dose and would like to continue decreasing it to 30 mg daily   The following portions of the patient's history were reviewed and updated as appropriate: allergies, current medications, past family history, past medical history, past social history, past surgical history and problem list.    Past Medical History:   Diagnosis Date   • Amaurosis fugax    • Antiphospholipid antibody positive    • Atrial premature complex    • Breast lump    • Cyst, breast    • Exogenous obesity    • Headache    • Hemianopsia    • Hiatal hernia    • Hx of bladder infections    • Hypothyroidism    • Migraine    • Nephrolithiasis    • Onset of menses     @ age 15 yo   • Pregnancy      3   • PVC's (premature ventricular contractions)    • Renal calculi    • Stress reaction    • Thrombocytopenia    • Thyroid disease    • Tinnitus    • Ventricular tachycardia    • Vertigo      Family History   Problem Relation Age of Onset   • Breast cancer Daughter    • Clotting disorder Other    • Hypothyroidism Other    • Hypertension Other      Social  History     Social History   • Marital status:      Spouse name: N/A   • Number of children: N/A   • Years of education: N/A     Occupational History   • Not on file.     Social History Main Topics   • Smoking status: Never Smoker   • Smokeless tobacco: Not on file   • Alcohol use No   • Drug use: No   • Sexual activity: Defer     Other Topics Concern   • Not on file     Social History Narrative     Allergies   Allergen Reactions   • Heparin Other (See Comments)     Heparin induced thrombocytopenia   • Adhesive Tape Rash     Blisters   • Latex Rash     Blisters       Current Outpatient Prescriptions:   •  amoxicillin (AMOXIL) 875 MG tablet, , Disp: , Rfl:   •  aspirin 81 MG tablet, Take 81 mg by mouth Daily., Disp: , Rfl:   •  fludrocortisone 0.1 MG tablet, Take 1 tablet by mouth Daily., Disp: 90 tablet, Rfl: 1  •  hydrocortisone (CORTEF) 10 MG tablet, Take 1 tablet by mouth 2 (Two) Times a Day. Pt takes 20 mg every morning and 10 mg every night.  (double this for the next 5 days 08/18/17), Disp: , Rfl:   •  levothyroxine (SYNTHROID) 100 MCG tablet, Take 1 tablet by mouth Daily., Disp: 90 tablet, Rfl: 1  •  liothyronine (CYTOMEL) 5 MCG tablet, Take 1 tablet by mouth 2 (Two) Times a Day., Disp: 60 tablet, Rfl: 1  •  lisdexamfetamine (VYVANSE) 60 MG capsule, Take 1 capsule by mouth Every Morning., Disp: 30 capsule, Rfl: 0  •  omeprazole (priLOSEC) 20 MG capsule, Take 20 mg by mouth Every Night., Disp: , Rfl:   •  valACYclovir (VALTREX) 500 MG tablet, Take 500 mg by mouth 2 (Two) Times a Day As Needed (Fever blisters)., Disp: , Rfl:   •  valACYclovir (VALTREX) 500 MG tablet, TAKE FOUR TABLETS BY MOUTH TWICE A DAY, Disp: 68 tablet, Rfl: 2  •  warfarin (COUMADIN) 1 MG tablet, Take 1 mg by mouth Daily. Total daily dose of 8.5 mg., Disp: , Rfl:   •  warfarin (COUMADIN) 7.5 MG tablet, Take 7.5 mg by mouth Daily. Total daily dose of 8.5 mg., Disp: , Rfl:         Review of Systems   Constitutional: Negative for chills,  "fatigue and fever.   Cardiovascular: Negative for chest pain and palpitations.   Gastrointestinal: Negative for abdominal pain, constipation, diarrhea, nausea and vomiting.   Endocrine: Negative for cold intolerance and heat intolerance.   All other systems reviewed and are negative.      Objective       Vitals:    12/12/17 1522   BP: 118/82   Pulse: 95   Weight: 97.1 kg (214 lb)   Height: 167.6 cm (66\")     Body mass index is 34.54 kg/(m^2).      Physical Exam   Constitutional: She is oriented to person, place, and time. She appears well-developed and well-nourished.   obese   HENT:   Head: Normocephalic and atraumatic.   Eyes: EOM are normal. Pupils are equal, round, and reactive to light.   Neck: Normal range of motion. Neck supple. No thyromegaly present.   Cardiovascular: Normal rate, regular rhythm, normal heart sounds and intact distal pulses.    Pulmonary/Chest: Effort normal and breath sounds normal.   Abdominal: Soft. Bowel sounds are normal. She exhibits no distension. There is no tenderness.   Musculoskeletal: Normal range of motion. She exhibits no edema.   Neurological: She is alert and oriented to person, place, and time. She has normal reflexes.   Skin: Skin is warm and dry. No rash noted.   Psychiatric: She has a normal mood and affect. Her behavior is normal.   Nursing note and vitals reviewed.    Results Review:     I reviewed the patient's new clinical results.    Medical records reviewed  Summary:      Results Encounter on 10/31/2017   Component Date Value Ref Range Status   • Glucose 11/01/2017 93  65 - 99 mg/dL Final   • BUN 11/01/2017 14  6 - 20 mg/dL Final   • Creatinine 11/01/2017 1.20* 0.57 - 1.00 mg/dL Final   • eGFR Non African Am 11/01/2017 47* >60 mL/min/1.73 Final   • eGFR African Am 11/01/2017 57* >60 mL/min/1.73 Final   • BUN/Creatinine Ratio 11/01/2017 11.7  7.0 - 25.0 Final   • Sodium 11/01/2017 143  136 - 145 mmol/L Final   • Potassium 11/01/2017 4.3  3.5 - 5.2 mmol/L Final   • " Chloride 11/01/2017 103  98 - 107 mmol/L Final   • Total CO2 11/01/2017 25.9  22.0 - 29.0 mmol/L Final   • Calcium 11/01/2017 9.0  8.6 - 10.5 mg/dL Final   • Total Protein 11/01/2017 7.0  6.0 - 8.5 g/dL Final   • Albumin 11/01/2017 4.30  3.50 - 5.20 g/dL Final   • Globulin 11/01/2017 2.7  gm/dL Final   • A/G Ratio 11/01/2017 1.6  g/dL Final   • Total Bilirubin 11/01/2017 0.3  0.1 - 1.2 mg/dL Final   • Alkaline Phosphatase 11/01/2017 191* 39 - 117 U/L Final   • AST (SGOT) 11/01/2017 18  1 - 32 U/L Final   • ALT (SGPT) 11/01/2017 17  1 - 33 U/L Final   • Free T4 11/01/2017 1.34  0.93 - 1.70 ng/dL Final   • TSH 11/01/2017 0.315  0.270 - 4.200 mIU/mL Final   • T3, Total 11/01/2017 120.9  80.0 - 200.0 ng/dL Final   • 25 Hydroxy, Vitamin D 11/01/2017 105.7* 30.0 - 100.0 ng/mL Final    Comment: Reference Range for Total Vitamin D 25(OH)  Deficiency    <20.0 ng/mL  Insufficiency 21-29 ng/mL  Sufficiency    ng/mL  Toxicity      >100 ng/ml        • Cortisol - AM 11/01/2017 29.6* 6.2 - 19.4 ug/dL Final   • ACTH 11/01/2017 16.8  7.2 - 63.3 pg/mL Final    ACTH reference interval for samples collected between 7 and 10 AM.     No results found for: HGBA1C  Lab Results   Component Value Date    CREATININE 1.20 (H) 11/01/2017     Imaging Results (most recent)     None                Assessment and Plan:    Eden was seen today for hypothyroidism and adrenal problem.    Diagnoses and all orders for this visit:    Chronic adrenal insufficiency    Ethan's disease    Mineralocorticoid deficiency    Hypothyroidism (acquired)    Chronic fatigue    Abnormal weight gain          #1 adrenal insufficiency;  #2 hypothyroidism on levothyroxine 125  #3 fatigue stable  #4 Renal insufficiency with a creatinine at 1.0  #5 skin hyperpigmentation  Not currently a problem  #6 Abnormal weight gain a significant problem    Since her recent discharge. She is done reasonably well. At home.  She reported that a few days ago. She felt nauseous  "and vomiting at about 11 PM.  She took some Tylenol and told her hydrocortisone for 3 days and started feeling much better and was able to avoid hospitalization. Again.    Patient is compliant with her medication Cytomel, levothyroxine, hydrocortisone.  She's currently taking 30-40 mg a hydrocortisone on a daily basis.    Patient reports that her symptoms are mostly stable, and she's able to manage with the current dose.  Patient will continue the current medication and return for followup. In 6 months.      The total time spent for old record and lab review and face- to- face was more than 25 min of which greater than 15 min of time was spent on counseling the patient on recommended evaluation and treatment options, instructions for management/treatment and /or follow up  and importance of compliance with chosen management or treatment options   Rah Donahue MD. FACE    12/12/17      EM[facility] Dragon / transcription disclaimer:     \"Dictated utilizing Dragon dictation\".         "

## 2018-01-10 RX ORDER — LEVOTHYROXINE SODIUM 0.1 MG/1
TABLET ORAL
Qty: 90 TABLET | Refills: 0 | Status: SHIPPED | OUTPATIENT
Start: 2018-01-10 | End: 2018-07-10

## 2018-01-25 RX ORDER — FLUDROCORTISONE ACETATE 0.1 MG/1
TABLET ORAL
Qty: 90 TABLET | Refills: 0 | Status: SHIPPED | OUTPATIENT
Start: 2018-01-25 | End: 2018-04-24 | Stop reason: SDUPTHER

## 2018-01-25 RX ORDER — OMEPRAZOLE 20 MG/1
CAPSULE, DELAYED RELEASE ORAL
Qty: 90 CAPSULE | Refills: 0 | Status: SHIPPED | OUTPATIENT
Start: 2018-01-25 | End: 2018-04-22 | Stop reason: SDUPTHER

## 2018-02-25 DIAGNOSIS — E27.40 CHRONIC ADRENAL INSUFFICIENCY (HCC): ICD-10-CM

## 2018-02-26 RX ORDER — HYDROCORTISONE 10 MG/1
TABLET ORAL
Qty: 180 TABLET | Refills: 0 | Status: SHIPPED | OUTPATIENT
Start: 2018-02-26 | End: 2018-04-29 | Stop reason: SDUPTHER

## 2018-03-20 RX ORDER — LIOTHYRONINE SODIUM 5 UG/1
TABLET ORAL
Qty: 60 TABLET | Refills: 0 | Status: SHIPPED | OUTPATIENT
Start: 2018-03-20 | End: 2018-04-22 | Stop reason: SDUPTHER

## 2018-03-22 ENCOUNTER — OFFICE VISIT (OUTPATIENT)
Dept: FAMILY MEDICINE CLINIC | Facility: CLINIC | Age: 58
End: 2018-03-22

## 2018-03-22 VITALS
TEMPERATURE: 98.6 F | SYSTOLIC BLOOD PRESSURE: 140 MMHG | OXYGEN SATURATION: 97 % | DIASTOLIC BLOOD PRESSURE: 80 MMHG | RESPIRATION RATE: 18 BRPM | HEART RATE: 101 BPM | WEIGHT: 220 LBS | HEIGHT: 66 IN | BODY MASS INDEX: 35.36 KG/M2

## 2018-03-22 DIAGNOSIS — L82.1 SEBORRHEIC KERATOSIS: ICD-10-CM

## 2018-03-22 DIAGNOSIS — F90.2 ATTENTION DEFICIT HYPERACTIVITY DISORDER (ADHD), COMBINED TYPE: Primary | ICD-10-CM

## 2018-03-22 PROCEDURE — 99213 OFFICE O/P EST LOW 20 MIN: CPT | Performed by: FAMILY MEDICINE

## 2018-03-22 NOTE — PROGRESS NOTES
Tomas Woods is a 57 y.o. female with   Chief Complaint   Patient presents with   • ADD   .    History of Present Illness     Patient presents today for follow up of stable ADHD.  Patient continues to tolerate current medication well.  Pt is able to focus and concentrate on tasks appropriately.  She denies having any side effects that can be associated with the use of Vyvanse.  Patient states that she does not use Vyvnase on the weekends unless she needs to work.      Patient has a small spot on her left cheek that she has picked recently.  This lesion is raised and evenly colored.  This lesion has been present for the last 8 to 9 months.     The following portions of the patient's history were reviewed and updated as appropriate: allergies, current medications, past family history, past medical history, past social history, past surgical history and problem list.    Review of Systems   Skin:        Seborrheic Keratosis to left side of face below the left eye   Psychiatric/Behavioral: Negative for decreased concentration (ADHD improved with medication).   All other systems reviewed and are negative.      Objective     Vitals:    03/22/18 0929   BP: 140/80   Pulse: 101   Resp: 18   Temp: 98.6 °F (37 °C)   SpO2: 97%     BP Readings from Last 3 Encounters:   03/22/18 140/80   12/12/17 118/82   10/04/17 120/80      Wt Readings from Last 3 Encounters:   03/22/18 99.8 kg (220 lb)   12/12/17 97.1 kg (214 lb)   10/04/17 94.8 kg (209 lb)        Physical Exam   Constitutional: She is oriented to person, place, and time. She appears well-developed and well-nourished.   HENT:   Head: Normocephalic and atraumatic.       Seborrheic Keratosis   Pulmonary/Chest: Effort normal.   Neurological: She is alert and oriented to person, place, and time.   Skin: Skin is warm and dry. No rash noted.   2 mm raised tan lesion left facial cheek with the early appearance of seborrheic keratosis.   Psychiatric: She has a normal  mood and affect. Her speech is normal and behavior is normal. Judgment and thought content normal. Cognition and memory are normal.   Nursing note and vitals reviewed.      Assessment/Plan   Eden was seen today for add.    Diagnoses and all orders for this visit:    Attention deficit hyperactivity disorder (ADHD), combined type    Seborrheic keratosis    Other orders  -     lisdexamfetamine (VYVANSE) 60 MG capsule; Take 1 capsule by mouth Every Morning Earliest Fill Date: 3/22/18 Do not fill until April 3, 2018        Return in about 4 months (around 7/22/2018).        Scribed for Lorne Lyle MD by Star Horowitz. 03/22/2018    ILorne MD personally performed the services described in this documentation, as scribed by Star Horowitz in my presence, and it is both accurate and complete

## 2018-03-26 LAB
25(OH)D3+25(OH)D2 SERPL-MCNC: >100 NG/ML (ref 30–100)
ACTH PLAS-MCNC: 41 PG/ML (ref 7.2–63.3)
ALBUMIN SERPL-MCNC: 4.3 G/DL (ref 3.5–5.2)
ALBUMIN/GLOB SERPL: 1.7 G/DL
ALP SERPL-CCNC: 196 U/L (ref 39–117)
ALT SERPL-CCNC: 21 U/L (ref 1–33)
AST SERPL-CCNC: 21 U/L (ref 1–32)
BILIRUB SERPL-MCNC: 0.3 MG/DL (ref 0.1–1.2)
BUN SERPL-MCNC: 21 MG/DL (ref 6–20)
BUN/CREAT SERPL: 19.4 (ref 7–25)
CALCIUM SERPL-MCNC: 8.8 MG/DL (ref 8.6–10.5)
CHLORIDE SERPL-SCNC: 102 MMOL/L (ref 98–107)
CO2 SERPL-SCNC: 24.1 MMOL/L (ref 22–29)
CORTIS AM PEAK SERPL-MCNC: 24.5 UG/DL (ref 6.2–19.4)
CREAT SERPL-MCNC: 1.08 MG/DL (ref 0.57–1)
GFR SERPLBLD CREATININE-BSD FMLA CKD-EPI: 53 ML/MIN/1.73
GFR SERPLBLD CREATININE-BSD FMLA CKD-EPI: 64 ML/MIN/1.73
GLOBULIN SER CALC-MCNC: 2.5 GM/DL
GLUCOSE SERPL-MCNC: 85 MG/DL (ref 65–99)
POTASSIUM SERPL-SCNC: 4.1 MMOL/L (ref 3.5–5.2)
PROT SERPL-MCNC: 6.8 G/DL (ref 6–8.5)
SODIUM SERPL-SCNC: 140 MMOL/L (ref 136–145)
T4 FREE SERPL-MCNC: 1.19 NG/DL (ref 0.93–1.7)
TSH SERPL DL<=0.005 MIU/L-ACNC: 2.05 MIU/ML (ref 0.27–4.2)

## 2018-04-06 ENCOUNTER — OFFICE VISIT (OUTPATIENT)
Dept: ENDOCRINOLOGY | Age: 58
End: 2018-04-06

## 2018-04-06 VITALS
HEIGHT: 66 IN | WEIGHT: 223.2 LBS | SYSTOLIC BLOOD PRESSURE: 130 MMHG | BODY MASS INDEX: 35.87 KG/M2 | DIASTOLIC BLOOD PRESSURE: 100 MMHG | HEART RATE: 85 BPM

## 2018-04-06 DIAGNOSIS — E27.40 CHRONIC ADRENAL INSUFFICIENCY (HCC): ICD-10-CM

## 2018-04-06 DIAGNOSIS — E27.49 MINERALOCORTICOID DEFICIENCY (HCC): ICD-10-CM

## 2018-04-06 DIAGNOSIS — R63.5 ABNORMAL WEIGHT GAIN: ICD-10-CM

## 2018-04-06 DIAGNOSIS — E03.9 HYPOTHYROIDISM (ACQUIRED): Primary | ICD-10-CM

## 2018-04-06 DIAGNOSIS — E27.1 ADDISON'S DISEASE (HCC): ICD-10-CM

## 2018-04-06 DIAGNOSIS — R53.82 CHRONIC FATIGUE: ICD-10-CM

## 2018-04-06 PROCEDURE — 99214 OFFICE O/P EST MOD 30 MIN: CPT | Performed by: INTERNAL MEDICINE

## 2018-04-06 NOTE — PROGRESS NOTES
57 y.o.    Patient Care Team:  Lorne Lyle DO as PCP - General    Chief Complaint:      F/U [place]'S DISEASE.  HYPOTHYROIDISM.  HERE TO DISCUSS LAB RESULTS.  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  She denies any side effects  Patient denies any swelling of the legs or symptoms of hypokalemia  Abnormal weight gain  Patient continues to gain weight.  She is currently 223 pounds with a BMI of 36  She reports that she has not been very compliant with her exercise or diet  Hypothyroidism  Patient is currently taking levothyroxine 100 µg along with Cytomel 5 µg twice daily  Patient started experiencing joint pains for the past several weeks and reports that she considers levothyroxine to be the cause  She apparently has used Synthroid for several years when he comfortably until the joint pain started about 6 years ago.  She had to discontinue Synthroid and switch to [place] [facility] at that time  For the past 2 years she has been taking levothyroxine and Cytomel since I started taking over thyroid management from the primary care  Patient wants to discontinue levothyroxine and go back to [place] [facility]      Interval History      SUMMARY  Patient was initially seen by me during her hospitalization in July 2014 when she was admitted after sepsis relating to cholecystectomy. She was in the hospital for nearly 3 weeks and apparently her thyroid medication was not continued at that time. So when I evaluated her she was clearly hypothyroid as well as hypotensive  with extreme fatigue and dizziness and needing frequent IV fluids.  She was given IV hydrocortisone and Synthroid IV and she recovered remarkably well. Postsurgical recovery she insisted on staying on [place] Thyroid from her primary care physician.  She was transitioned to hydrocortisone and she was taking 20 mg in the morning and 10 mg in the evening but subsequently was tapered to 10 mg twice a day. Currently she is taking 7.5 mg in the morning and 2.5 mg in the evening.  She denies any dizziness or fatigue she is able to continue her activities of daily living without any problem. She denied any nausea or abdominal symptoms.she was considered to have a secondary adrenal insufficiency most likely relating to the adrenal hemorrhage that was observed on the CT scan during her initial surgery.  Patient has not had her thyroid checked recent times.  She reports significant hair loss for the past several months especially since surgery. And is very concerned about it   weight gain  Patient actually gained about 7 pounds in the past 3 months despite tapering hydrocortisone dose. She also reports that her blood pressure is always low, systolic is typically less than 100, all her life.  hyperpigmentation of the skin  patient as well as her  reported that her hands seem to be getting darker over the past several months. She does not tan easily, and upon exposure to sun she turns red.she is concerned about [place]'s disease.  she denied any scar hyperpigmentation other than the latest a scar from August 2014.  subsequent evaluation revealed that her ACT H levels were in the 1600 range and patient was also severely hypokalemic which led to the hospitalization and treatment. She was put on high-dose hydrocortisone as well as Florinef and she recovered remarkably well.  Currently patient is taking 20 g hydro-cortisone in the morning and 10 mg in the evening along with fludrocortisone 0.1 mg daily. She is also on [place]  Thyroid  for the past several weeks patient reported that her skin changes of hyperpigmentation have resolved completely. Her energy levels are back and she feels excellent except for weight gain  Patient and her  reported that her daughter passed away from cancer approximately 4 weeks ago and they're learning to cope with it.  Over the past 3-4 months have been a stressful in her life and she was also admitted in the hospital with adrenal insufficiency late August 2015.  At the time of discharge she was advised to continue 40 mg daily which she gradually decreased to 20 mg but then increased it back to 30 mg for the past 3 weeks  She has been compliant with fludrocortisone and also [place] [facility]  Patient denied any recurrence of hyperpigmentation that she has noticed in the past. She does report to excess fatigue. No dizziness reported no nausea. No diarrhea.  Interval history  In the previous visit due to extreme stress that the patient was undergoing-since having lost her daughter I recommended that she increase the hydrocortisone to 60 mg daily-30 mg at breakfast, 20 mg at lunch, 10 mg at 4 PM.  Patient reported that she has felt significantly improved but was gaining a lot of weight and was not happy about it so she decided on her own to decrease the dose 2 weeks ago to 50 ng for 1 week and subsequently 40 mg for the past one week-she is currently taking 20 minute grams in the morning and 10 mg at lunch and dinner. Next then she reports that she is still feeling better with this dose and would like to continue decreasing it to 30 mg daily   The following portions of the patient's history were reviewed and updated as appropriate: allergies, current medications, past family history, past medical history, past social history, past surgical history and problem list.    Past Medical History:   Diagnosis Date   • Amaurosis fugax    • Antiphospholipid antibody positive    • Atrial premature complex    • Breast  lump    • Cyst, breast    • Exogenous obesity    • Headache    • Hemianopsia    • Hiatal hernia    • Hx of bladder infections    • Hypothyroidism    • Migraine    • Nephrolithiasis    • Onset of menses     @ age 15 yo   • Pregnancy      3   • PVC's (premature ventricular contractions)    • Renal calculi    • Stress reaction    • Thrombocytopenia    • Thyroid disease    • Tinnitus    • Ventricular tachycardia    • Vertigo      Family History   Problem Relation Age of Onset   • Breast cancer Daughter    • Clotting disorder Other    • Hypothyroidism Other    • Hypertension Other      Social History     Social History   • Marital status:      Spouse name: N/A   • Number of children: N/A   • Years of education: N/A     Occupational History   • Not on file.     Social History Main Topics   • Smoking status: Never Smoker   • Smokeless tobacco: Never Used   • Alcohol use No   • Drug use: No   • Sexual activity: Defer     Other Topics Concern   • Not on file     Social History Narrative   • No narrative on file     Allergies   Allergen Reactions   • Heparin Other (See Comments)     Heparin induced thrombocytopenia   • Adhesive Tape Rash     Blisters   • Latex Rash     Blisters       Current Outpatient Prescriptions:   •  aspirin 81 MG tablet, Take 81 mg by mouth Daily., Disp: , Rfl:   •  fludrocortisone 0.1 MG tablet, TAKE ONE TABLET BY MOUTH DAILY, Disp: 90 tablet, Rfl: 0  •  hydrocortisone (CORTEF) 10 MG tablet, Take 1 tablet by mouth 2 (Two) Times a Day. Pt takes 20 mg every morning and 10 mg every night.  (double this for the next 5 days 17), Disp: , Rfl:   •  hydrocortisone (CORTEF) 10 MG tablet, TAKE ONE TABLET BY MOUTH SIX TIMES A DAY, Disp: 180 tablet, Rfl: 0  •  levothyroxine (SYNTHROID, LEVOTHROID) 100 MCG tablet, TAKE ONE TABLET BY MOUTH DAILY, Disp: 90 tablet, Rfl: 0  •  liothyronine (CYTOMEL) 5 MCG tablet, TAKE ONE TABLET BY MOUTH TWICE A DAY, Disp: 60 tablet, Rfl: 0  •  lisdexamfetamine  "(VYVANSE) 60 MG capsule, Take 1 capsule by mouth Every Morning Earliest Fill Date: 3/22/18 Do not fill until April 3, 2018, Disp: 30 capsule, Rfl: 0  •  omeprazole (priLOSEC) 20 MG capsule, Take 20 mg by mouth Every Night., Disp: , Rfl:   •  omeprazole (priLOSEC) 20 MG capsule, TAKE ONE CAPSULE BY MOUTH EVERY NIGHT AT BEDTIME, Disp: 90 capsule, Rfl: 0  •  valACYclovir (VALTREX) 500 MG tablet, TAKE FOUR TABLETS BY MOUTH TWICE A DAY, Disp: 68 tablet, Rfl: 2  •  warfarin (COUMADIN) 1 MG tablet, Take 1 mg by mouth Daily. Total daily dose of 8.5 mg., Disp: , Rfl:   •  warfarin (COUMADIN) 7.5 MG tablet, Take 7.5 mg by mouth Daily. Total daily dose of 8.5 mg., Disp: , Rfl:         Review of Systems   Constitutional: Negative for chills, fatigue and fever.   Cardiovascular: Negative for chest pain and palpitations.   Gastrointestinal: Negative for abdominal pain, constipation, diarrhea, nausea and vomiting.   Endocrine: Negative for cold intolerance and heat intolerance.   All other systems reviewed and are negative.      Objective       Vitals:    04/06/18 1323   BP: 130/100   Pulse: 85   Weight: 101 kg (223 lb 3.2 oz)   Height: 167.6 cm (66\")     Body mass index is 36.03 kg/m².      Physical Exam   Constitutional: She is oriented to person, place, and time. She appears well-developed and well-nourished.   obese   HENT:   Head: Normocephalic and atraumatic.   Eyes: EOM are normal. Pupils are equal, round, and reactive to light.   Neck: Normal range of motion. Neck supple. No thyromegaly present.   Cardiovascular: Normal rate, regular rhythm, normal heart sounds and intact distal pulses.    Pulmonary/Chest: Effort normal and breath sounds normal.   Abdominal: Soft. Bowel sounds are normal. She exhibits no distension. There is no tenderness.   Musculoskeletal: Normal range of motion. She exhibits no edema.   Neurological: She is alert and oriented to person, place, and time. She has normal reflexes.   Skin: Skin is warm and " dry. No rash noted.   Psychiatric: She has a normal mood and affect. Her behavior is normal.   Nursing note and vitals reviewed.    Results Review:     I reviewed the patient's new clinical results.    Medical records reviewed  Summary:      Orders Only on 03/23/2018   Component Date Value Ref Range Status   • Glucose 03/26/2018 85  65 - 99 mg/dL Final   • BUN 03/26/2018 21* 6 - 20 mg/dL Final   • Creatinine 03/26/2018 1.08* 0.57 - 1.00 mg/dL Final   • eGFR Non African Am 03/26/2018 53* >60 mL/min/1.73 Final   • eGFR African Am 03/26/2018 64  >60 mL/min/1.73 Final   • BUN/Creatinine Ratio 03/26/2018 19.4  7.0 - 25.0 Final   • Sodium 03/26/2018 140  136 - 145 mmol/L Final   • Potassium 03/26/2018 4.1  3.5 - 5.2 mmol/L Final   • Chloride 03/26/2018 102  98 - 107 mmol/L Final   • Total CO2 03/26/2018 24.1  22.0 - 29.0 mmol/L Final   • Calcium 03/26/2018 8.8  8.6 - 10.5 mg/dL Final   • Total Protein 03/26/2018 6.8  6.0 - 8.5 g/dL Final   • Albumin 03/26/2018 4.30  3.50 - 5.20 g/dL Final   • Globulin 03/26/2018 2.5  gm/dL Final   • A/G Ratio 03/26/2018 1.7  g/dL Final   • Total Bilirubin 03/26/2018 0.3  0.1 - 1.2 mg/dL Final   • Alkaline Phosphatase 03/26/2018 196* 39 - 117 U/L Final   • AST (SGOT) 03/26/2018 21  1 - 32 U/L Final   • ALT (SGPT) 03/26/2018 21  1 - 33 U/L Final   • Free T4 03/26/2018 1.19  0.93 - 1.70 ng/dL Final   • TSH 03/26/2018 2.050  0.270 - 4.200 mIU/mL Final   • ACTH 03/26/2018 41.0  7.2 - 63.3 pg/mL Final   • 25 Hydroxy, Vitamin D 03/26/2018 >100.0* 30.0 - 100.0 ng/ml Final    Comment: Reference Range for Total Vitamin D 25(OH)  Deficiency    <20.0 ng/mL  Insufficiency 21-29 ng/mL  Sufficiency    ng/mL  Toxicity      >100 ng/ml        • Cortisol - AM 03/26/2018 24.5* 6.2 - 19.4 ug/dL Final     No results found for: HGBA1C  Lab Results   Component Value Date    CREATININE 1.08 (H) 03/23/2018     Imaging Results (most recent)     None          Assessment and Plan:    Diagnoses and all orders  for this visit:    Hypothyroidism (acquired)    Mineralocorticoid deficiency    Chronic adrenal insufficiency    [place]'s disease    Chronic fatigue            #1 adrenal insufficiency;  #2 hypothyroidism on levothyroxine 125  #3 fatigue stable  #4 Renal insufficiency with a creatinine at 1.0  #5 skin hyperpigmentation  Not currently a problem  #6 Abnormal weight gain a significant problem     Patient is once again wants to change the levothyroxine since she is convinced that the levothyroxine is causing her severe joint aches and pains.  She apparently tried Synthroid for several years and after a long period of time she had experienced similar problems and was switched to [place] [facility] and felt better  She has been taking levothyroxine for the past 2 years comfortably until recently she started experiencing joint pains again and she believes that levothyroxine is the reason for the joint pains and wants to change back to armor    I explained to the patient that if she considers going back to [place] [facility] I recommend that she get this medication from the primary care physician as she was doing before.  I will not use [place] [facility] and try to reason with her highly fluctuating lab tests  A brief review of the past tests reveals that while she was taking [place] [facility] her TSH was 0.01- 0.03 range and her free T3 was elevated greater than 200  Both patient and her  verbalized understanding    Patient is willing to try Tirosint at the same dose for some time  She was given samples for 6 weeks  He the patient continues to experience the same symptoms she may try Levoxyl or she may decide to go back to [place] [facility] through the primary care    Rest of the lab tests appear stable  Her TSH is perfect along with T4 data    The total time spent for old record and lab review and face- to- face was more than 25 min of which greater than 15 min of time ( greater than 50% of the total time )  was spent face to face  "with the patient counseling and coordination of care on recommended evaluation and treatment options, instructions for management/treatment and /or follow up  and importance of compliance with chosen management or treatment options    Rah Donahue MD. FACE    04/06/18      EMR Dragon / transcription disclaimer:     \"Dictated utilizing Dragon dictation\".         "

## 2018-04-23 RX ORDER — LIOTHYRONINE SODIUM 5 UG/1
TABLET ORAL
Qty: 60 TABLET | Refills: 0 | Status: SHIPPED | OUTPATIENT
Start: 2018-04-23 | End: 2018-05-26 | Stop reason: SDUPTHER

## 2018-04-23 RX ORDER — OMEPRAZOLE 20 MG/1
CAPSULE, DELAYED RELEASE ORAL
Qty: 90 CAPSULE | Refills: 0 | Status: SHIPPED | OUTPATIENT
Start: 2018-04-23 | End: 2018-07-10 | Stop reason: SDUPTHER

## 2018-04-24 RX ORDER — FLUDROCORTISONE ACETATE 0.1 MG/1
0.1 TABLET ORAL DAILY
Qty: 90 TABLET | Refills: 1 | Status: SHIPPED | OUTPATIENT
Start: 2018-04-24 | End: 2018-10-22 | Stop reason: SDUPTHER

## 2018-04-29 DIAGNOSIS — E27.40 CHRONIC ADRENAL INSUFFICIENCY (HCC): ICD-10-CM

## 2018-04-30 RX ORDER — HYDROCORTISONE 10 MG/1
TABLET ORAL
Qty: 180 TABLET | Refills: 0 | Status: SHIPPED | OUTPATIENT
Start: 2018-04-30 | End: 2018-06-11 | Stop reason: SDUPTHER

## 2018-05-14 RX ORDER — LEVOTHYROXINE SODIUM 100 UG/1
100 CAPSULE ORAL DAILY
Qty: 30 CAPSULE | Refills: 3 | Status: SHIPPED | OUTPATIENT
Start: 2018-05-14 | End: 2018-09-09 | Stop reason: SDUPTHER

## 2018-05-15 ENCOUNTER — TELEPHONE (OUTPATIENT)
Dept: ENDOCRINOLOGY | Age: 58
End: 2018-05-15

## 2018-05-15 NOTE — TELEPHONE ENCOUNTER
----- Message from Isaac Mcnamara Rep sent at 5/14/2018  4:10 PM EDT -----  Contact: PATIENT  PATIENT CALLED IN REGARDS TO SAMPLE MEDICATION THAT SHE HAS BEEN TAKING AND WANTS TO TALK TO YOU ABOUT It.    [facility] [phone]    SCRIPT SENT FOR TIROSINT 100, WAITING ON DRUG REP TO RETURN CALL FOR SAMPLES

## 2018-05-29 RX ORDER — LIOTHYRONINE SODIUM 5 UG/1
TABLET ORAL
Qty: 60 TABLET | Refills: 0 | Status: SHIPPED | OUTPATIENT
Start: 2018-05-29 | End: 2018-06-24 | Stop reason: SDUPTHER

## 2018-06-11 DIAGNOSIS — E27.40 CHRONIC ADRENAL INSUFFICIENCY (HCC): ICD-10-CM

## 2018-06-11 RX ORDER — HYDROCORTISONE 10 MG/1
TABLET ORAL
Qty: 180 TABLET | Refills: 0 | Status: SHIPPED | OUTPATIENT
Start: 2018-06-11 | End: 2018-07-10 | Stop reason: SDUPTHER

## 2018-06-25 RX ORDER — LIOTHYRONINE SODIUM 5 UG/1
TABLET ORAL
Qty: 60 TABLET | Refills: 0 | Status: SHIPPED | OUTPATIENT
Start: 2018-06-25 | End: 2018-07-30 | Stop reason: SDUPTHER

## 2018-06-28 RX ORDER — VALACYCLOVIR HYDROCHLORIDE 500 MG/1
TABLET, FILM COATED ORAL
Qty: 68 TABLET | Refills: 1 | Status: SHIPPED | OUTPATIENT
Start: 2018-06-28 | End: 2019-03-04

## 2018-07-10 ENCOUNTER — OFFICE VISIT (OUTPATIENT)
Dept: FAMILY MEDICINE CLINIC | Facility: CLINIC | Age: 58
End: 2018-07-10

## 2018-07-10 VITALS
TEMPERATURE: 98.2 F | DIASTOLIC BLOOD PRESSURE: 70 MMHG | HEIGHT: 66 IN | WEIGHT: 224.5 LBS | RESPIRATION RATE: 16 BRPM | HEART RATE: 98 BPM | BODY MASS INDEX: 36.08 KG/M2 | SYSTOLIC BLOOD PRESSURE: 124 MMHG | OXYGEN SATURATION: 99 %

## 2018-07-10 DIAGNOSIS — Z79.899 HIGH RISK MEDICATION USE: ICD-10-CM

## 2018-07-10 DIAGNOSIS — F90.0 ATTENTION DEFICIT HYPERACTIVITY DISORDER (ADHD), PREDOMINANTLY INATTENTIVE TYPE: Primary | ICD-10-CM

## 2018-07-10 PROCEDURE — 99213 OFFICE O/P EST LOW 20 MIN: CPT | Performed by: FAMILY MEDICINE

## 2018-07-10 NOTE — PROGRESS NOTES
Subjective   Eden Woods is a 57 y.o. female with   Chief Complaint   Patient presents with   • Med Refill     on vyvanse   .    History of Present Illness     Pt is a 56 yo white female who presents today to follow up on ADHD.  She currently takes Vyvanse 60mg daily for this and she reports its working great for her without question or complaint.She is able to complete task in orderly fashion and is less easily distracted.  Impulse control is good.  She reports no side effects such as tachycardia or sleep disturbance and it works well at controlling her symptoms of decreased concentration and inability to focus.  Patient denies tremor or decreased appetite.  Pt has no other questions or side effects.    The following portions of the patient's history were reviewed and updated as appropriate: allergies, current medications, past family history, past medical history, past social history, past surgical history and problem list.    Review of Systems   Psychiatric/Behavioral: Positive for decreased concentration. Negative for dysphoric mood and sleep disturbance. The patient is not hyperactive.    All other systems reviewed and are negative.      Objective     Vitals:    07/10/18 0914   BP: 124/70   Pulse: 98   Resp: 16   Temp: 98.2 °F (36.8 °C)   SpO2: 99%     BP Readings from Last 3 Encounters:   07/10/18 124/70   04/06/18 130/100   03/22/18 140/80      Wt Readings from Last 3 Encounters:   07/10/18 102 kg (224 lb 8 oz)   04/06/18 101 kg (223 lb 3.2 oz)   03/22/18 99.8 kg (220 lb)        No results found for this or any previous visit (from the past 168 hour(s)).    Physical Exam   Constitutional: She is oriented to person, place, and time. Vital signs are normal. She appears well-developed and well-nourished. She is cooperative.   HENT:   Head: Normocephalic and atraumatic.   Neurological: She is alert and oriented to person, place, and time.   Skin: Skin is warm and dry. No rash noted.   Psychiatric: She has a  normal mood and affect. Her speech is normal and behavior is normal. Judgment and thought content normal. Cognition and memory are normal.   Nursing note and vitals reviewed.      Assessment/Plan   Eden was seen today for med refill.    Diagnoses and all orders for this visit:    Attention deficit hyperactivity disorder (ADHD), predominantly inattentive type    High risk medication use  -     Compliance Drug Analysis, Ur - Urine, Clean Catch        Return in about 4 months (around 11/10/2018).    Scribed for Lorne Lyle MD by aMri Lehman CMA. 07/10/2018    I, Lorne Lyle MD personally performed the services described in this documentation, as scribed by Mari Lehman CMA in my presence, and it is both accurate and complete

## 2018-07-11 PROBLEM — F90.0 ATTENTION DEFICIT HYPERACTIVITY DISORDER (ADHD), PREDOMINANTLY INATTENTIVE TYPE: Status: ACTIVE | Noted: 2017-10-04

## 2018-07-13 LAB — DRUGS UR: NORMAL

## 2018-07-30 ENCOUNTER — LAB (OUTPATIENT)
Dept: ENDOCRINOLOGY | Age: 58
End: 2018-07-30

## 2018-07-30 DIAGNOSIS — E03.9 HYPOTHYROIDISM (ACQUIRED): Primary | ICD-10-CM

## 2018-07-30 DIAGNOSIS — E27.1 ADDISON'S DISEASE (HCC): ICD-10-CM

## 2018-07-30 DIAGNOSIS — E27.40 CHRONIC ADRENAL INSUFFICIENCY (HCC): ICD-10-CM

## 2018-07-30 DIAGNOSIS — E03.9 HYPOTHYROIDISM (ACQUIRED): ICD-10-CM

## 2018-07-30 RX ORDER — LIOTHYRONINE SODIUM 5 UG/1
TABLET ORAL
Qty: 60 TABLET | Refills: 0 | Status: SHIPPED | OUTPATIENT
Start: 2018-07-30 | End: 2018-08-27 | Stop reason: SDUPTHER

## 2018-07-31 LAB
25(OH)D3+25(OH)D2 SERPL-MCNC: 97.8 NG/ML (ref 30–100)
ACTH PLAS-MCNC: 124.2 PG/ML (ref 7.2–63.3)
ALBUMIN SERPL-MCNC: 4.3 G/DL (ref 3.5–5.2)
ALBUMIN/GLOB SERPL: 2 G/DL
ALP SERPL-CCNC: 158 U/L (ref 39–117)
ALT SERPL-CCNC: 18 U/L (ref 1–33)
AST SERPL-CCNC: 24 U/L (ref 1–32)
BILIRUB SERPL-MCNC: 0.3 MG/DL (ref 0.1–1.2)
BUN SERPL-MCNC: 23 MG/DL (ref 6–20)
BUN/CREAT SERPL: 20.5 (ref 7–25)
CALCIUM SERPL-MCNC: 9.2 MG/DL (ref 8.6–10.5)
CHLORIDE SERPL-SCNC: 102 MMOL/L (ref 98–107)
CO2 SERPL-SCNC: 24.8 MMOL/L (ref 22–29)
CORTIS AM PEAK SERPL-MCNC: 0.4 UG/DL (ref 6.2–19.4)
CREAT SERPL-MCNC: 1.12 MG/DL (ref 0.57–1)
GLOBULIN SER CALC-MCNC: 2.2 GM/DL
GLUCOSE SERPL-MCNC: 90 MG/DL (ref 65–99)
POTASSIUM SERPL-SCNC: 4.2 MMOL/L (ref 3.5–5.2)
PROT SERPL-MCNC: 6.5 G/DL (ref 6–8.5)
SODIUM SERPL-SCNC: 140 MMOL/L (ref 136–145)
T4 FREE SERPL-MCNC: 1.41 NG/DL (ref 0.93–1.7)
TSH SERPL DL<=0.005 MIU/L-ACNC: 0.45 MIU/ML (ref 0.27–4.2)

## 2018-08-06 ENCOUNTER — OFFICE VISIT (OUTPATIENT)
Dept: ENDOCRINOLOGY | Age: 58
End: 2018-08-06

## 2018-08-06 VITALS
WEIGHT: 216.2 LBS | SYSTOLIC BLOOD PRESSURE: 114 MMHG | HEIGHT: 66 IN | HEART RATE: 98 BPM | DIASTOLIC BLOOD PRESSURE: 68 MMHG | BODY MASS INDEX: 34.75 KG/M2

## 2018-08-06 DIAGNOSIS — R63.5 ABNORMAL WEIGHT GAIN: ICD-10-CM

## 2018-08-06 DIAGNOSIS — R53.82 CHRONIC FATIGUE: ICD-10-CM

## 2018-08-06 DIAGNOSIS — E03.9 HYPOTHYROIDISM (ACQUIRED): ICD-10-CM

## 2018-08-06 DIAGNOSIS — E27.49 MINERALOCORTICOID DEFICIENCY (HCC): ICD-10-CM

## 2018-08-06 DIAGNOSIS — E27.1 ADDISON'S DISEASE (HCC): Primary | ICD-10-CM

## 2018-08-06 PROCEDURE — 99214 OFFICE O/P EST MOD 30 MIN: CPT | Performed by: INTERNAL MEDICINE

## 2018-08-06 RX ORDER — HYDROCORTISONE 10 MG/1
TABLET ORAL
Qty: 360 TABLET | Refills: 1 | Status: SHIPPED | OUTPATIENT
Start: 2018-08-06 | End: 2019-01-21 | Stop reason: SDUPTHER

## 2018-08-06 RX ORDER — WARFARIN SODIUM 4 MG/1
TABLET ORAL
COMMUNITY
Start: 2018-07-23 | End: 2018-11-05

## 2018-08-06 NOTE — PROGRESS NOTES
57 y.o.    Patient Care Team:  Lorne Lyle DO as PCP - General    Chief Complaint:      F/U [facility]ST[facility]'S DISEASE.  HYPOTHYROIDISM.  HERE TO DISCUSS LAB RESULTS.     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  Since then she is been taking 20 and 10  Mineralocorticoid deficiency   patient reported that he is currently taking Florinef 1 µg daily.  Patient reports compliance with the medication and denies any side effects  He also denied any swelling of the legs or symptoms of hypokalemia  Abnormal weight gain  Patient currently weighs 216 pounds with a BMI of 34.9.  Patient reported that she had not been very compliant with her diet or exercise  Hypothyroidism  Patient is currently taking levothyroxine 100 µg along with Cytomel 5 µg twice daily.    She apparently has used Synthroid for several years when he comfortably until the joint pain started about 6 years ago.  She had to discontinue Synthroid and switch to [place] Thyroid at that time  For the past 2 years she has been taking levothyroxine and Cytomel since I started taking over thyroid management from the primary care  Patient wants to discontinue levothyroxine and go back to [place] Thyroid        Interval History      SUMMARY  Patient was initially seen by me during her hospitalization in July 2014 when she was admitted after sepsis relating to cholecystectomy. She was in the hospital for nearly 3 weeks and apparently her thyroid medication was not continued at that time. So when I evaluated her she was clearly hypothyroid as  well as hypotensive with extreme fatigue and dizziness and needing frequent IV fluids.  She was given IV hydrocortisone and Synthroid IV and she recovered remarkably well. Postsurgical recovery she insisted on staying on [place] Thyroid from her primary care physician.  She was transitioned to hydrocortisone and she was taking 20 mg in the morning and 10 mg in the evening but subsequently was tapered to 10 mg twice a day. Currently she is taking 7.5 mg in the morning and 2.5 mg in the evening.  She denies any dizziness or fatigue she is able to continue her activities of daily living without any problem. She denied any nausea or abdominal symptoms.she was considered to have a secondary adrenal insufficiency most likely relating to the adrenal hemorrhage that was observed on the CT scan during her initial surgery.  Patient has not had her thyroid checked recent times.  She reports significant hair loss for the past several months especially since surgery. And is very concerned about it   weight gain  Patient actually gained about 7 pounds in the past 3 months despite tapering hydrocortisone dose. She also reports that her blood pressure is always low, systolic is typically less than 100, all her life.  hyperpigmentation of the skin  patient as well as her  reported that her hands seem to be getting darker over the past several months. She does not tan easily, and upon exposure to sun she turns red.she is concerned about Ethan's disease.  she denied any scar hyperpigmentation other than the latest a scar from August 2014.  subsequent evaluation revealed that her ACT H levels were in the 1600 range and patient was also severely hypokalemic which led to the hospitalization and treatment. She was put on high-dose hydrocortisone as well as Florinef and she recovered remarkably well.  Currently patient is taking 20 g hydro-cortisone in the morning and 10 mg in the evening along with fludrocortisone 0.1 mg daily.  She is also on [place] Thyroid  for the past several weeks patient reported that her skin changes of hyperpigmentation have resolved completely. Her energy levels are back and she feels excellent except for weight gain  Patient and her  reported that her daughter passed away from cancer approximately 4 weeks ago and they're learning to cope with it.  Over the past 3-4 months have been a stressful in her life and she was also admitted in the hospital with adrenal insufficiency late August 2015.  At the time of discharge she was advised to continue 40 mg daily which she gradually decreased to 20 mg but then increased it back to 30 mg for the past 3 weeks  She has been compliant with fludrocortisone and also [place] Thyroid  Patient denied any recurrence of hyperpigmentation that she has noticed in the past. She does report to excess fatigue. No dizziness reported no nausea. No diarrhea.  Interval history  In the previous visit due to extreme stress that the patient was undergoing-since having lost her daughter I recommended that she increase the hydrocortisone to 60 mg daily-30 mg at breakfast, 20 mg at lunch, 10 mg at 4 PM.  Patient reported that she has felt significantly improved but was gaining a lot of weight and was not happy about it so she decided on her own to decrease the dose 2 weeks ago to 50 ng for 1 week and subsequently 40 mg for the past one week-she is currently taking 20 minute grams in the morning and 10 mg at lunch and dinner. Next then she reports that she is still feeling better with this dose and would like to continue decreasing it to 30 mg daily     levothyroxine is the reason for the joint pains and wants to change back to armor     I explained to the patient that if she considers going back to [place] Thyroid I recommend that she get this medication from the primary care physician as she was doing before.  I will not use [place] Thyroid and try to reason with her highly fluctuating lab  tests  A brief review of the past tests reveals that while she was taking [place] Thyroid her TSH was 0.01- 0.03 range and her free T3 was elevated greater than 200     Patient is willing to try Tirosint at the same dose for some time  She was given samples for 6 weeks  He the patient continues to experience the same symptoms she may try Levoxyl or she may decide to go back to [place] Thyroid through the primary care      The following portions of the patient's history were reviewed and updated as appropriate: allergies, current medications, past family history, past medical history, past social history, past surgical history and problem list.    Past Medical History:   Diagnosis Date   • Amaurosis fugax    • Antiphospholipid antibody positive    • Atrial premature complex    • Breast lump    • Cyst, breast    • Exogenous obesity    • Headache    • Hemianopsia    • Hiatal hernia    • Hx of bladder infections    • Hypothyroidism    • Migraine    • Nephrolithiasis    • Onset of menses     @ age 13 yo   • Pregnancy      3   • PVC's (premature ventricular contractions)    • Renal calculi    • Stress reaction    • Thrombocytopenia (CMS/HCC)    • Thyroid disease    • Tinnitus    • Ventricular tachycardia (CMS/HCC)    • Vertigo      Family History   Problem Relation Age of Onset   • Breast cancer Daughter    • Clotting disorder Other    • Hypothyroidism Other    • Hypertension Other      Social History     Social History   • Marital status:      Spouse name: N/A   • Number of children: N/A   • Years of education: N/A     Occupational History   • Not on file.     Social History Main Topics   • Smoking status: Never Smoker   • Smokeless tobacco: Never Used   • Alcohol use No   • Drug use: No   • Sexual activity: Defer     Other Topics Concern   • Not on file     Social History Narrative   • No narrative on file     Allergies   Allergen Reactions   • Heparin Other (See Comments)     Heparin induced  "thrombocytopenia   • Adhesive Tape Rash     Blisters   • Latex Rash     Blisters       Current Outpatient Prescriptions:   •  aspirin 81 MG tablet, Take 81 mg by mouth Daily., Disp: , Rfl:   •  fludrocortisone 0.1 MG tablet, Take 1 tablet by mouth Daily., Disp: 90 tablet, Rfl: 1  •  hydrocortisone (CORTEF) 10 MG tablet, Take 1 tablet by mouth 2 (Two) Times a Day. Pt takes 20 mg every morning and 10 mg every night.  (double this for the next 5 days 08/18/17), Disp: , Rfl:   •  liothyronine (CYTOMEL) 5 MCG tablet, TAKE ONE TABLET BY MOUTH TWICE A DAY, Disp: 60 tablet, Rfl: 0  •  lisdexamfetamine (VYVANSE) 60 MG capsule, Take 1 capsule by mouth Every Morning, Disp: 30 capsule, Rfl: 0  •  omeprazole (priLOSEC) 20 MG capsule, Take 20 mg by mouth Every Night., Disp: , Rfl:   •  TIROSINT 100 MCG capsule, Take 1 capsule by mouth Daily., Disp: 30 capsule, Rfl: 3  •  valACYclovir (VALTREX) 500 MG tablet, TAKE FOUR TABLETS BY MOUTH TWICE A DAY, Disp: 68 tablet, Rfl: 1  •  warfarin (COUMADIN) 1 MG tablet, Take 1 mg by mouth Daily. Total daily dose of 8.5 mg., Disp: , Rfl:   •  warfarin (COUMADIN) 7.5 MG tablet, Take 7.5 mg by mouth Daily. Total daily dose of 8.5 mg., Disp: , Rfl:         Review of Systems   Constitutional: Negative for chills, fatigue and fever.   Cardiovascular: Negative for chest pain and palpitations.   Gastrointestinal: Negative for abdominal pain, constipation, diarrhea, nausea and vomiting.   Endocrine: Negative for cold intolerance and heat intolerance.   All other systems reviewed and are negative.      Objective       Vitals:    08/06/18 0904   BP: 114/68   Pulse: 98   Weight: 98.1 kg (216 lb 3.2 oz)   Height: 167.6 cm (66\")     Body mass index is 34.9 kg/m².      Physical Exam   Constitutional: She is oriented to person, place, and time. She appears well-developed and well-nourished.   obese   HENT:   Head: Normocephalic and atraumatic.   Eyes: Pupils are equal, round, and reactive to light. EOM are " normal.   Neck: Normal range of motion. Neck supple. No thyromegaly present.   Cardiovascular: Normal rate, regular rhythm, normal heart sounds and intact distal pulses.    Pulmonary/Chest: Effort normal and breath sounds normal.   Abdominal: Soft. Bowel sounds are normal. She exhibits no distension. There is no tenderness.   Musculoskeletal: Normal range of motion. She exhibits no edema.   Neurological: She is alert and oriented to person, place, and time. She has normal reflexes.   Skin: Skin is warm and dry. No rash noted.   Psychiatric: She has a normal mood and affect. Her behavior is normal.   Nursing note and vitals reviewed.    Results Review:     I reviewed the patient's new clinical results.    Medical records reviewed  Summary:      Lab on 07/30/2018   Component Date Value Ref Range Status   • Glucose 07/30/2018 90  65 - 99 mg/dL Final   • BUN 07/30/2018 23* 6 - 20 mg/dL Final   • Creatinine 07/30/2018 1.12* 0.57 - 1.00 mg/dL Final   • eGFR Non  Am 07/30/2018 51* >60 mL/min/1.73 Final   • eGFR African Am 07/30/2018 61  >60 mL/min/1.73 Final   • BUN/Creatinine Ratio 07/30/2018 20.5  7.0 - 25.0 Final   • Sodium 07/30/2018 140  136 - 145 mmol/L Final   • Potassium 07/30/2018 4.2  3.5 - 5.2 mmol/L Final   • Chloride 07/30/2018 102  98 - 107 mmol/L Final   • Total CO2 07/30/2018 24.8  22.0 - 29.0 mmol/L Final   • Calcium 07/30/2018 9.2  8.6 - 10.5 mg/dL Final   • Total Protein 07/30/2018 6.5  6.0 - 8.5 g/dL Final   • Albumin 07/30/2018 4.30  3.50 - 5.20 g/dL Final   • Globulin 07/30/2018 2.2  gm/dL Final   • A/G Ratio 07/30/2018 2.0  g/dL Final   • Total Bilirubin 07/30/2018 0.3  0.1 - 1.2 mg/dL Final   • Alkaline Phosphatase 07/30/2018 158* 39 - 117 U/L Final   • AST (SGOT) 07/30/2018 24  1 - 32 U/L Final   • ALT (SGPT) 07/30/2018 18  1 - 33 U/L Final   • Free T4 07/30/2018 1.41  0.93 - 1.70 ng/dL Final   • TSH 07/30/2018 0.451  0.270 - 4.200 mIU/mL Final   • ACTH 07/30/2018 124.2* 7.2 - 63.3 pg/mL  Final    ACTH reference interval for samples collected between 7 and 10 AM.   • 25 Hydroxy, Vitamin D 07/30/2018 97.8  30.0 - 100.0 ng/ml Final    Comment: Reference Range for Total Vitamin D 25(OH)  Deficiency    <20.0 ng/mL  Insufficiency 21-29 ng/mL  Sufficiency    ng/mL  Toxicity      >100 ng/ml        • Cortisol - AM 07/30/2018 0.4* 6.2 - 19.4 ug/dL Final     No results found for: HGBA1C  Lab Results   Component Value Date    CREATININE 1.12 (H) 07/30/2018     Imaging Results (most recent)     None                Assessment and Plan:    Eden was seen today for adrenal problem and hypothyroidism.    Diagnoses and all orders for this visit:    [place]'s disease (CMS/HCC)  -     ACTH; Future  -     Cortisol; Future    Mineralocorticoid deficiency (CMS/HCC)    Hypothyroidism (acquired)    Abnormal weight gain    Chronic fatigue    Other orders  -     hydrocortisone (CORTEF) 10 MG tablet; Pt takes 30 mg every morning and 10 mg every night.           #1 adrenal insufficiency;  #2 hypothyroidism on levothyroxine 125  #3 fatigue stable  #4 Renal insufficiency with a creatinine at 1.0  #5 skin hyperpigmentation  Not currently a problem  #6 Abnormal weight gain a significant problem     Patient is currently taking levothyroxine brand-name Tirosint 100 µg daily along with Cytomel 5 µg twice daily  Patient reports compliance with the medication  Denies any side effects  Patient is on hydrocortisone 30 mg in the morning and 10 mg in the evening at least for the past several weeks until July 16, 2018 [place] she is currently back on 20 mg in the morning and 10 mg in the evening    Patient's lab reports reviewed  Serum ACTH level of 124 along with a cortisol level of 0.4 is highly suspicious if she has secondary adrenal insufficiency  Thyroid levels appear stable    Advised the patient to continue the medication without single break  Patient will return to follow-up in 4 months with lab tests    The total time spent   "was more than 25 min of which greater than 15 min of time ( greater than 50% of the total time )  was spent face to face with the patient counseling and coordination of care on recommended evaluation and treatment options, instructions for management/treatment and /or follow up  and importance of compliance with chosen management or treatment options      Rah Donahue MD. FACE    08/06/18      EM[facility] Dragon / transcription disclaimer:     \"Dictated utilizing Dragon dictation\".         "

## 2018-08-11 ENCOUNTER — RESULTS ENCOUNTER (OUTPATIENT)
Dept: ENDOCRINOLOGY | Age: 58
End: 2018-08-11

## 2018-08-11 DIAGNOSIS — E27.1 ADDISON'S DISEASE (HCC): ICD-10-CM

## 2018-08-27 ENCOUNTER — TELEPHONE (OUTPATIENT)
Dept: ENDOCRINOLOGY | Age: 58
End: 2018-08-27

## 2018-08-27 RX ORDER — VALACYCLOVIR HYDROCHLORIDE 1 G/1
TABLET, FILM COATED ORAL
Qty: 30 TABLET | Refills: 0 | Status: SHIPPED | OUTPATIENT
Start: 2018-08-27 | End: 2018-09-21 | Stop reason: SDUPTHER

## 2018-08-27 NOTE — TELEPHONE ENCOUNTER
PATIENT CALLED IN WITH C/O HAVING SHINGLES. I SPOKE WITH DR. BRUCE AND HE ADVISED THAT SHE COULD DOUBLE HER DOSE OF HYDROCORTISONE FOR TWO WEEKS. SHE ADVISED THAT SHE WILL CALL HER PCP TO SEE HOW SHE SHOULD TAKE HER VALCYCLOVIR.

## 2018-08-28 RX ORDER — LIOTHYRONINE SODIUM 5 UG/1
TABLET ORAL
Qty: 60 TABLET | Refills: 0 | Status: SHIPPED | OUTPATIENT
Start: 2018-08-28 | End: 2018-09-24 | Stop reason: SDUPTHER

## 2018-09-10 RX ORDER — LEVOTHYROXINE SODIUM 100 UG/1
CAPSULE ORAL
Qty: 30 CAPSULE | Refills: 2 | Status: SHIPPED | OUTPATIENT
Start: 2018-09-10 | End: 2018-12-06 | Stop reason: SDUPTHER

## 2018-09-11 LAB
ACTH PLAS-MCNC: 5.7 PG/ML (ref 7.2–63.3)
CORTIS SERPL-MCNC: 19.8 UG/DL

## 2018-09-21 RX ORDER — VALACYCLOVIR HYDROCHLORIDE 1 G/1
TABLET, FILM COATED ORAL
Qty: 30 TABLET | Refills: 0 | Status: SHIPPED | OUTPATIENT
Start: 2018-09-21 | End: 2018-11-05

## 2018-09-24 RX ORDER — LIOTHYRONINE SODIUM 5 UG/1
TABLET ORAL
Qty: 60 TABLET | Refills: 0 | Status: SHIPPED | OUTPATIENT
Start: 2018-09-24 | End: 2018-10-22 | Stop reason: SDUPTHER

## 2018-10-22 RX ORDER — OMEPRAZOLE 20 MG/1
CAPSULE, DELAYED RELEASE ORAL
Qty: 90 CAPSULE | Refills: 0 | Status: SHIPPED | OUTPATIENT
Start: 2018-10-22 | End: 2018-11-05

## 2018-10-22 RX ORDER — LIOTHYRONINE SODIUM 5 UG/1
TABLET ORAL
Qty: 60 TABLET | Refills: 0 | Status: SHIPPED | OUTPATIENT
Start: 2018-10-22 | End: 2018-11-26 | Stop reason: SDUPTHER

## 2018-10-22 RX ORDER — FLUDROCORTISONE ACETATE 0.1 MG/1
TABLET ORAL
Qty: 90 TABLET | Refills: 0 | Status: SHIPPED | OUTPATIENT
Start: 2018-10-22 | End: 2019-03-04

## 2018-10-25 DIAGNOSIS — E27.40 CHRONIC ADRENAL INSUFFICIENCY (HCC): ICD-10-CM

## 2018-10-25 DIAGNOSIS — E27.1 ADDISON'S DISEASE (HCC): Primary | ICD-10-CM

## 2018-10-25 DIAGNOSIS — E03.9 HYPOTHYROIDISM (ACQUIRED): ICD-10-CM

## 2018-10-29 ENCOUNTER — RESULTS ENCOUNTER (OUTPATIENT)
Dept: ENDOCRINOLOGY | Age: 58
End: 2018-10-29

## 2018-10-29 DIAGNOSIS — E03.9 HYPOTHYROIDISM (ACQUIRED): ICD-10-CM

## 2018-10-29 DIAGNOSIS — E27.1 ADDISON'S DISEASE (HCC): ICD-10-CM

## 2018-10-29 DIAGNOSIS — E27.40 CHRONIC ADRENAL INSUFFICIENCY (HCC): ICD-10-CM

## 2018-11-05 ENCOUNTER — OFFICE VISIT (OUTPATIENT)
Dept: FAMILY MEDICINE CLINIC | Facility: CLINIC | Age: 58
End: 2018-11-05

## 2018-11-05 VITALS
SYSTOLIC BLOOD PRESSURE: 126 MMHG | HEIGHT: 66 IN | BODY MASS INDEX: 33.27 KG/M2 | HEART RATE: 78 BPM | OXYGEN SATURATION: 99 % | DIASTOLIC BLOOD PRESSURE: 70 MMHG | TEMPERATURE: 98.1 F | WEIGHT: 207 LBS | RESPIRATION RATE: 16 BRPM

## 2018-11-05 DIAGNOSIS — F90.0 ATTENTION DEFICIT HYPERACTIVITY DISORDER (ADHD), PREDOMINANTLY INATTENTIVE TYPE: Primary | ICD-10-CM

## 2018-11-05 PROCEDURE — 99213 OFFICE O/P EST LOW 20 MIN: CPT | Performed by: FAMILY MEDICINE

## 2018-11-05 NOTE — PROGRESS NOTES
Subjective   Eden Woods is a 58 y.o. female with   Chief Complaint   Patient presents with   • Follow-up     on mediations   .    History of Present Illness     59 yo white female who presents for follow up on ADHD.  She is currently prescribed Vyvanse 60 mg daily.  She states it works very well at controlling her her  Symptoms.  She states she typically just takes it Monday through Friday and occasionally on Sunday's.  It works well at helping to control her ability to remain focused and concentrate on daily tasks and get them done in a timely manner.  She states she has none of the typical side effects such as Tachyarrhythmia, decreased appetite or insomnia.    The following portions of the patient's history were reviewed and updated as appropriate: allergies, current medications, past family history, past medical history, past social history, past surgical history and problem list.    Review of Systems   Psychiatric/Behavioral: Positive for decreased concentration. Negative for sleep disturbance. The patient is hyperactive.         ADHD-predominantly inattentive type   All other systems reviewed and are negative.      Objective     Vitals:    11/05/18 0929   BP: 126/70   Pulse: 78   Resp: 16   Temp: 98.1 °F (36.7 °C)   SpO2: 99%     BP Readings from Last 3 Encounters:   11/05/18 126/70   08/06/18 114/68   07/10/18 124/70      Wt Readings from Last 3 Encounters:   11/05/18 93.9 kg (207 lb)   08/06/18 98.1 kg (216 lb 3.2 oz)   07/10/18 102 kg (224 lb 8 oz)        No results found for this or any previous visit (from the past 168 hour(s)).    Physical Exam   Constitutional: She is oriented to person, place, and time. She appears well-developed and well-nourished.   HENT:   Head: Normocephalic and atraumatic.   Neurological: She is alert and oriented to person, place, and time.   Skin: Skin is warm and dry. No rash noted.   Psychiatric: She has a normal mood and affect. Her speech is normal and behavior is  normal. Judgment and thought content normal. Cognition and memory are normal.   Nursing note and vitals reviewed.      Assessment/Plan   Eden was seen today for follow-up.    Diagnoses and all orders for this visit:    Attention deficit hyperactivity disorder (ADHD), predominantly inattentive type    Other orders  -     lisdexamfetamine (VYVANSE) 60 MG capsule; Take 1 capsule by mouth Every Morning      Return in about 4 months (around 3/5/2019).    Scribed for Lorne Lyle MD by Mari Lehman CMA. 11/05/2018    I, Lorne Lyle MD personally performed the services described in this documentation, as scribed by Mari Lehman CMA in my presence, and it is both accurate and complete

## 2018-11-06 LAB
25(OH)D3+25(OH)D2 SERPL-MCNC: >120 NG/ML (ref 30–100)
ACTH PLAS-MCNC: 90.1 PG/ML (ref 7.2–63.3)
ALBUMIN SERPL-MCNC: 4.2 G/DL (ref 3.5–5.2)
ALBUMIN/GLOB SERPL: 1.6 G/DL
ALP SERPL-CCNC: 171 U/L (ref 39–117)
ALT SERPL-CCNC: 20 U/L (ref 1–33)
AST SERPL-CCNC: 21 U/L (ref 1–32)
BILIRUB SERPL-MCNC: 0.3 MG/DL (ref 0.1–1.2)
BUN SERPL-MCNC: 13 MG/DL (ref 6–20)
BUN/CREAT SERPL: 13.1 (ref 7–25)
CALCIUM SERPL-MCNC: 9.4 MG/DL (ref 8.6–10.5)
CHLORIDE SERPL-SCNC: 104 MMOL/L (ref 98–107)
CO2 SERPL-SCNC: 22.8 MMOL/L (ref 22–29)
CORTIS AM PEAK SERPL-MCNC: 33.7 UG/DL (ref 6.2–19.4)
CREAT SERPL-MCNC: 0.99 MG/DL (ref 0.57–1)
GLOBULIN SER CALC-MCNC: 2.7 GM/DL
GLUCOSE SERPL-MCNC: 98 MG/DL (ref 65–99)
POTASSIUM SERPL-SCNC: 4.4 MMOL/L (ref 3.5–5.2)
PROT SERPL-MCNC: 6.9 G/DL (ref 6–8.5)
SODIUM SERPL-SCNC: 140 MMOL/L (ref 136–145)
T4 FREE SERPL-MCNC: 1.29 NG/DL (ref 0.93–1.7)
TSH SERPL DL<=0.005 MIU/L-ACNC: 0.86 MIU/ML (ref 0.27–4.2)

## 2018-11-12 ENCOUNTER — OFFICE VISIT (OUTPATIENT)
Dept: ENDOCRINOLOGY | Age: 58
End: 2018-11-12

## 2018-11-12 VITALS
HEIGHT: 66 IN | WEIGHT: 218 LBS | BODY MASS INDEX: 35.03 KG/M2 | HEART RATE: 101 BPM | DIASTOLIC BLOOD PRESSURE: 88 MMHG | SYSTOLIC BLOOD PRESSURE: 122 MMHG

## 2018-11-12 DIAGNOSIS — E03.9 HYPOTHYROIDISM (ACQUIRED): ICD-10-CM

## 2018-11-12 DIAGNOSIS — R53.82 CHRONIC FATIGUE: ICD-10-CM

## 2018-11-12 DIAGNOSIS — R63.5 ABNORMAL WEIGHT GAIN: ICD-10-CM

## 2018-11-12 DIAGNOSIS — E27.1 ADDISON'S DISEASE (HCC): Primary | ICD-10-CM

## 2018-11-12 DIAGNOSIS — E27.49 MINERALOCORTICOID DEFICIENCY (HCC): ICD-10-CM

## 2018-11-12 PROCEDURE — 99214 OFFICE O/P EST MOD 30 MIN: CPT | Performed by: INTERNAL MEDICINE

## 2018-11-12 NOTE — PROGRESS NOTES
58 y.o.    Patient Care Team:  Lorne Lyle DO as PCP - General    Chief Complaint:      F/U HOUSTON'S DISEASE.  HYPOTHYROIDISM.  HERE TO DISCUSS LAB RESULTS.  Subjective     HPI   Patient is a 58-year-old white female with a past history of hypothyroidism and primary adrenal insufficiency secondary to bilateral adrenal hemorrhages postoperatively came for follow-up    Primary adrenal insufficiency  Patient is currently taking hydrocortisone 20 mg in the morning and 10 mg in the afternoon  She reported that in the month of August she had to increase the  to- double dose when she had shingles for nearly one month  She started decreasing back to baseline approximate 25 weeks ago  She is feeling reasonably better with the baseline dose of 20 mg in the morning and 10 mg in the evening for the past 5 weeks  She is also taking fludrocortisone daily in the morning 1 µg    Hypothyroidism  Patient is currently taking Tirosint and Cytomel twice daily  She reports compliance with the medication  Denies any side effects.  She had difficulty with the pricing but managed to reduce it by calling the insurance company  Abnormal weight gain  Patient currently weighs 218 pounds with a BMI of 35.2   patient has no symptoms of hypokalemia     Interval History      SUMMARY  Patient was initially seen by me during her hospitalization in July 2014 when she was admitted after sepsis relating to cholecystectomy. She was in the hospital for nearly 3 weeks and apparently her thyroid medication was not continued at that time. So when I evaluated her she was clearly hypothyroid as well as hypotensive with extreme fatigue and dizziness and needing frequent IV fluids.  She was given IV hydrocortisone and Synthroid IV and she recovered remarkably well. Postsurgical recovery she insisted on staying on [place] Thyroid from her primary care physician.  She was transitioned to hydrocortisone and she was taking 20 mg in the morning and 10 mg in  the evening but subsequently was tapered to 10 mg twice a day. Currently she is taking 7.5 mg in the morning and 2.5 mg in the evening.  She denies any dizziness or fatigue she is able to continue her activities of daily living without any problem. She denied any nausea or abdominal symptoms.she was considered to have a secondary adrenal insufficiency most likely relating to the adrenal hemorrhage that was observed on the CT scan during her initial surgery.  Patient has not had her thyroid checked recent times.  She reports significant hair loss for the past several months especially since surgery. And is very concerned about it   weight gain  Patient actually gained about 7 pounds in the past 3 months despite tapering hydrocortisone dose. She also reports that her blood pressure is always low, systolic is typically less than 100, all her life.  hyperpigmentation of the skin  patient as well as her  reported that her hands seem to be getting darker over the past several months. She does not tan easily, and upon exposure to sun she turns red.she is concerned about [place]'s disease.  she denied any scar hyperpigmentation other than the latest a scar from August 2014.  subsequent evaluation revealed that her ACT H levels were in the 1600 range and patient was also severely hypokalemic which led to the hospitalization and treatment. She was put on high-dose hydrocortisone as well as Florinef and she recovered remarkably well.  Currently patient is taking 20 g hydro-cortisone in the morning and 10 mg in the evening along with fludrocortisone 0.1 mg daily. She is also on [place] Thyroid  for the past several weeks patient reported that her skin changes of hyperpigmentation have resolved completely. Her energy levels are back and she feels excellent except for weight gain  Patient and her  reported that her daughter passed away from cancer approximately 4 weeks ago and they're learning to cope with it.  Over  the past 3-4 months have been a stressful in her life and she was also admitted in the hospital with adrenal insufficiency late August 2015.  At the time of discharge she was advised to continue 40 mg daily which she gradually decreased to 20 mg but then increased it back to 30 mg for the past 3 weeks  She has been compliant with fludrocortisone and also [place] Thyroid  Patient denied any recurrence of hyperpigmentation that she has noticed in the past. She does report to excess fatigue. No dizziness reported no nausea. No diarrhea.  Interval history  In the previous visit due to extreme stress that the patient was undergoing-since having lost her daughter I recommended that she increase the hydrocortisone to 60 mg daily-30 mg at breakfast, 20 mg at lunch, 10 mg at 4 PM.  Patient reported that she has felt significantly improved but was gaining a lot of weight and was not happy about it so she decided on her own to decrease the dose 2 weeks ago to 50 ng for 1 week and subsequently 40 mg for the past one week-she is currently taking 20 minute grams in the morning and 10 mg at lunch and dinner. Next then she reports that she is still feeling better with this dose and would like to continue decreasing it to 30 mg daily      levothyroxine is the reason for the joint pains and wants to change back to armor     I explained to the patient that if she considers going back to [place] Thyroid I recommend that she get this medication from the primary care physician as she was doing before.  I will not use [place] Thyroid and try to reason with her highly fluctuating lab tests  A brief review of the past tests reveals that while she was taking [place] Thyroid her TSH was 0.01- 0.03 range and her free T3 was elevated greater than 200     Patient is willing to try Tirosint at the same dose for some time  She was given samples for 6 weeks  He the patient continues to experience the same symptoms she may try Levoxyl or she may decide  to go back to [place] Thyroid through the primary care    The following portions of the patient's history were reviewed and updated as appropriate: allergies, current medications, past family history, past medical history, past social history, past surgical history and problem list.    Past Medical History:   Diagnosis Date   • Amaurosis fugax    • Antiphospholipid antibody positive    • Atrial premature complex    • Breast lump    • Cyst, breast    • Exogenous obesity    • Headache    • Hemianopsia    • Hiatal hernia    • Hx of bladder infections    • Hypothyroidism    • Migraine    • Nephrolithiasis    • Onset of menses     @ age 15 yo   • Pregnancy      3   • PVC's (premature ventricular contractions)    • Renal calculi    • Stress reaction    • Thrombocytopenia (CMS/HCC)    • Thyroid disease    • Tinnitus    • Ventricular tachycardia (CMS/HCC)    • Vertigo      Family History   Problem Relation Age of Onset   • Breast cancer Daughter    • Clotting disorder Other    • Hypothyroidism Other    • Hypertension Other      Social History     Socioeconomic History   • Marital status:      Spouse name: Not on file   • Number of children: Not on file   • Years of education: Not on file   • Highest education level: Not on file   Social Needs   • Financial resource strain: Not on file   • Food insecurity - worry: Not on file   • Food insecurity - inability: Not on file   • Transportation needs - medical: Not on file   • Transportation needs - non-medical: Not on file   Occupational History   • Not on file   Tobacco Use   • Smoking status: Never Smoker   • Smokeless tobacco: Never Used   Substance and Sexual Activity   • Alcohol use: No   • Drug use: No   • Sexual activity: Defer   Other Topics Concern   • Not on file   Social History Narrative   • Not on file     Allergies   Allergen Reactions   • Heparin Other (See Comments)     Heparin induced thrombocytopenia  Heparin induced thrombocytopenia   • Adhesive  "Tape Rash     Blisters   • Latex Rash     Blisters       Current Outpatient Medications:   •  aspirin 81 MG tablet, Take 81 mg by mouth Daily., Disp: , Rfl:   •  fludrocortisone 0.1 MG tablet, TAKE ONE TABLET BY MOUTH DAILY, Disp: 90 tablet, Rfl: 0  •  hydrocortisone (CORTEF) 10 MG tablet, Pt takes 30 mg every morning and 10 mg every night. (Patient taking differently: 30 mg. Pt takes 30 mg every morning and 10 mg every night.), Disp: 360 tablet, Rfl: 1  •  liothyronine (CYTOMEL) 5 MCG tablet, TAKE ONE TABLET BY MOUTH TWICE A DAY, Disp: 60 tablet, Rfl: 0  •  lisdexamfetamine (VYVANSE) 60 MG capsule, Take 1 capsule by mouth Every Morning, Disp: 30 capsule, Rfl: 0  •  omeprazole (priLOSEC) 20 MG capsule, Take 20 mg by mouth Every Night., Disp: , Rfl:   •  TIROSINT 100 MCG capsule, TAKE ONE CAPSULE BY MOUTH DAILY, Disp: 30 capsule, Rfl: 2  •  valACYclovir (VALTREX) 500 MG tablet, TAKE FOUR TABLETS BY MOUTH TWICE A DAY, Disp: 68 tablet, Rfl: 1  •  warfarin (COUMADIN) 1 MG tablet, Take 1 mg by mouth Daily. Total daily dose of 8.5 mg., Disp: , Rfl:   •  warfarin (COUMADIN) 7.5 MG tablet, Take 7.5 mg by mouth Daily. Total daily dose of 8.5 mg., Disp: , Rfl:         Review of Systems   Constitutional: Positive for fatigue. Negative for chills and fever.   Cardiovascular: Negative for chest pain and palpitations.   Gastrointestinal: Negative for abdominal pain, constipation, diarrhea, nausea and vomiting.   Endocrine: Negative for cold intolerance and heat intolerance.   All other systems reviewed and are negative.      Objective       Vitals:    11/12/18 1404   BP: 122/88   Pulse: 101   Weight: 98.9 kg (218 lb)   Height: 167.6 cm (66\")     Body mass index is 35.19 kg/m².      Physical Exam   Constitutional: She is oriented to person, place, and time. She appears well-developed and well-nourished.   obese   HENT:   Head: Normocephalic and atraumatic.   Eyes: EOM are normal. Pupils are equal, round, and reactive to light. "   Neck: Normal range of motion. Neck supple. No thyromegaly present.   Cardiovascular: Normal rate, regular rhythm, normal heart sounds and intact distal pulses.   Pulmonary/Chest: Effort normal and breath sounds normal.   Abdominal: Soft. Bowel sounds are normal. She exhibits no distension. There is no tenderness.   Musculoskeletal: Normal range of motion. She exhibits no edema.   Neurological: She is alert and oriented to person, place, and time. She has normal reflexes.   Skin: Skin is warm and dry. No rash noted.   Psychiatric: She has a normal mood and affect. Her behavior is normal.   Nursing note and vitals reviewed.    Results Review:     I reviewed the patient's new clinical results.    Medical records reviewed  Summary:      Results Encounter on 10/29/2018   Component Date Value Ref Range Status   • Glucose 11/05/2018 98  65 - 99 mg/dL Final   • BUN 11/05/2018 13  6 - 20 mg/dL Final   • Creatinine 11/05/2018 0.99  0.57 - 1.00 mg/dL Final   • eGFR Non African Am 11/05/2018 58* >60 mL/min/1.73 Final   • eGFR African Am 11/05/2018 70  >60 mL/min/1.73 Final   • BUN/Creatinine Ratio 11/05/2018 13.1  7.0 - 25.0 Final   • Sodium 11/05/2018 140  136 - 145 mmol/L Final   • Potassium 11/05/2018 4.4  3.5 - 5.2 mmol/L Final   • Chloride 11/05/2018 104  98 - 107 mmol/L Final   • Total CO2 11/05/2018 22.8  22.0 - 29.0 mmol/L Final   • Calcium 11/05/2018 9.4  8.6 - 10.5 mg/dL Final   • Total Protein 11/05/2018 6.9  6.0 - 8.5 g/dL Final   • Albumin 11/05/2018 4.20  3.50 - 5.20 g/dL Final   • Globulin 11/05/2018 2.7  gm/dL Final   • A/G Ratio 11/05/2018 1.6  g/dL Final   • Total Bilirubin 11/05/2018 0.3  0.1 - 1.2 mg/dL Final   • Alkaline Phosphatase 11/05/2018 171* 39 - 117 U/L Final   • AST (SGOT) 11/05/2018 21  1 - 32 U/L Final   • ALT (SGPT) 11/05/2018 20  1 - 33 U/L Final   • Free T4 11/05/2018 1.29  0.93 - 1.70 ng/dL Final   • TSH 11/05/2018 0.861  0.270 - 4.200 mIU/mL Final   • ACTH 11/05/2018 90.1* 7.2 - 63.3 pg/mL  Final    ACTH reference interval for samples collected between 7 and 10 AM.   • 25 Hydroxy, Vitamin D 11/05/2018 >120.0* 30.0 - 100.0 ng/ml Final    Comment: Reference Range for Total Vitamin D 25(OH)  Deficiency    <20.0 ng/mL  Insufficiency 21-29 ng/mL  Sufficiency    ng/mL  Toxicity      >100 ng/ml        • Cortisol - AM 11/05/2018 33.7* 6.2 - 19.4 ug/dL Final     No results found for: HGBA1C  Lab Results   Component Value Date    CREATININE 0.99 11/05/2018     Imaging Results (most recent)     None          Assessment and Plan:    Eden was seen today for adrenal problem and hypothyroidism.    Diagnoses and all orders for this visit:    [place]'s disease (CMS/HCC)    Hypothyroidism (acquired)    Mineralocorticoid deficiency (CMS/HCC)    Abnormal weight gain    Chronic fatigue    #1 adrenal insufficiency;  #2 hypothyroidism on levothyroxine 125  #3 fatigue stable  #4 Renal insufficiency with a creatinine at 1.0  #5 skin hyperpigmentation  Not currently a problem  #6 Abnormal weight gain a significant problem          Patient is currently taking had records on 20 mg in the morning and 10 mg in the afternoon for the past 5 weeks  She reported that she had shingles in the month of August and had to increase the hydrocortisone to 60 mg daily  She started tapering the first week of September  She is tolerating the taper reasonably well so far  She's currently taking 20 min grams in the morning and 10 mg in the evening her baseline dose  She is also taking fludrocortisone 0.1 mg daily in the morning  Patient denies any specific symptoms at this time  Her ACTH levels were high when she was sick    I advised the patient to continue the current regimen  Sodium and potassium levels were also in the normal range  Patient will return to follow-up in 3-4 months.    The total time spent  was more than 25 min of which greater than 15 min of time (greater than 50% of the total time)  was spent face to face with the  "patient counseling and coordination of care on recommended evaluation and treatment options, instructions for management/treatment and /or follow up and importance of compliance with chosen management or treatment options  Rah Donahue MD. FACE    11/12/18      EMR Dragon / transcription disclaimer:     \"Dictated utilizing Dragon dictation\".         "

## 2018-11-26 RX ORDER — LIOTHYRONINE SODIUM 5 UG/1
TABLET ORAL
Qty: 60 TABLET | Refills: 0 | Status: SHIPPED | OUTPATIENT
Start: 2018-11-26 | End: 2018-12-24 | Stop reason: SDUPTHER

## 2018-12-06 RX ORDER — LEVOTHYROXINE SODIUM 100 UG/1
CAPSULE ORAL
Qty: 28 CAPSULE | Refills: 1 | Status: SHIPPED | OUTPATIENT
Start: 2018-12-06 | End: 2019-03-04

## 2018-12-20 ENCOUNTER — OFFICE VISIT (OUTPATIENT)
Dept: ENDOCRINOLOGY | Age: 58
End: 2018-12-20

## 2018-12-20 VITALS
HEIGHT: 66 IN | DIASTOLIC BLOOD PRESSURE: 102 MMHG | HEART RATE: 107 BPM | WEIGHT: 218.4 LBS | BODY MASS INDEX: 35.1 KG/M2 | SYSTOLIC BLOOD PRESSURE: 138 MMHG

## 2018-12-20 DIAGNOSIS — B02.8 HERPES ZOSTER WITH COMPLICATION: Primary | ICD-10-CM

## 2018-12-20 DIAGNOSIS — E27.1 ADDISON'S DISEASE (HCC): ICD-10-CM

## 2018-12-20 PROCEDURE — 99214 OFFICE O/P EST MOD 30 MIN: CPT | Performed by: INTERNAL MEDICINE

## 2018-12-20 RX ORDER — CETIRIZINE HYDROCHLORIDE 10 MG/1
10 TABLET ORAL DAILY
COMMUNITY
End: 2019-03-04

## 2018-12-20 RX ORDER — WARFARIN SODIUM 5 MG/1
5 TABLET ORAL DAILY
COMMUNITY
Start: 2018-12-03 | End: 2019-01-30 | Stop reason: HOSPADM

## 2018-12-20 RX ORDER — WARFARIN SODIUM 4 MG/1
4 TABLET ORAL DAILY
COMMUNITY
Start: 2018-12-03 | End: 2019-01-30 | Stop reason: HOSPADM

## 2018-12-20 NOTE — PROGRESS NOTES
58 y.o.    Patient Care Team:  Lorne Lyle DO as PCP - General    Chief Complaint:      F/U HOUSTON'S DISEASE.  HYPOTHYROIDISM.  C/O HIVES.  Subjective     HPI   Patient is a 58-year-old white female with a history of [place]'s disease and hypothyroidism reported rash over the past few days  They reported that her rash to be urticaria    patient apparently was seen at her primary care physician (ago    Patient denies any systemic symptoms  She has history of shingles a few years ago not sure was on the left side as well    Patient reports intense itching at the site of the rash associated with now with vesicular rash over the left shoulder left upper arm and over the palm of the hand  All these areas are intensely itching with some burning pain    Patient denied any prior exposure to contact irritants such as poison ivy  When the patient called 2 days ago she was advised to increase the hydrocortisone by doubling the dose for stress  Apparently there was no improvement in the rash or worsening since the stress dosing  Interval History      The following portions of the patient's history were reviewed and updated as appropriate: allergies, current medications, past family history, past medical history, past social history, past surgical history and problem list.    Past Medical History:   Diagnosis Date   • Amaurosis fugax    • Antiphospholipid antibody positive    • Atrial premature complex    • Breast lump    • Cyst, breast    • Exogenous obesity    • Headache    • Hemianopsia    • Hiatal hernia    • Hx of bladder infections    • Hypothyroidism    • Migraine    • Nephrolithiasis    • Onset of menses     @ age 13 yo   • Pregnancy      3   • PVC's (premature ventricular contractions)    • Renal calculi    • Stress reaction    • Thrombocytopenia (CMS/HCC)    • Thyroid disease    • Tinnitus    • Ventricular tachycardia (CMS/HCC)    • Vertigo      Family History   Problem Relation Age of Onset   • Breast  cancer Daughter    • Clotting disorder Other    • Hypothyroidism Other    • Hypertension Other      Social History     Socioeconomic History   • Marital status:      Spouse name: Not on file   • Number of children: Not on file   • Years of education: Not on file   • Highest education level: Not on file   Social Needs   • Financial resource strain: Not on file   • Food insecurity - worry: Not on file   • Food insecurity - inability: Not on file   • Transportation needs - medical: Not on file   • Transportation needs - non-medical: Not on file   Occupational History   • Not on file   Tobacco Use   • Smoking status: Never Smoker   • Smokeless tobacco: Never Used   Substance and Sexual Activity   • Alcohol use: No   • Drug use: No   • Sexual activity: Defer   Other Topics Concern   • Not on file   Social History Narrative   • Not on file     Allergies   Allergen Reactions   • Heparin Other (See Comments)     Heparin induced thrombocytopenia  Heparin induced thrombocytopenia   • Adhesive Tape Rash     Blisters   • Latex Rash     Blisters       Current Outpatient Medications:   •  aspirin 81 MG tablet, Take 81 mg by mouth Daily., Disp: , Rfl:   •  fludrocortisone 0.1 MG tablet, TAKE ONE TABLET BY MOUTH DAILY, Disp: 90 tablet, Rfl: 0  •  hydrocortisone (CORTEF) 10 MG tablet, Pt takes 30 mg every morning and 10 mg every night. (Patient taking differently: 30 mg. Pt takes 30 mg every morning and 10 mg every night.), Disp: 360 tablet, Rfl: 1  •  liothyronine (CYTOMEL) 5 MCG tablet, TAKE ONE TABLET BY MOUTH TWICE A DAY, Disp: 60 tablet, Rfl: 0  •  lisdexamfetamine (VYVANSE) 60 MG capsule, Take 1 capsule by mouth Every Morning, Disp: 30 capsule, Rfl: 0  •  omeprazole (priLOSEC) 20 MG capsule, Take 20 mg by mouth Every Night., Disp: , Rfl:   •  TIROSINT 100 MCG capsule, TAKE ONE CAPSULE BY MOUTH DAILY, Disp: 28 capsule, Rfl: 1  •  valACYclovir (VALTREX) 500 MG tablet, TAKE FOUR TABLETS BY MOUTH TWICE A DAY, Disp: 68  "tablet, Rfl: 1  •  warfarin (COUMADIN) 1 MG tablet, Take 1 mg by mouth Daily. Total daily dose of 8.5 mg., Disp: , Rfl:   •  warfarin (COUMADIN) 7.5 MG tablet, Take 7.5 mg by mouth Daily. Total daily dose of 8.5 mg., Disp: , Rfl:         Review of Systems   Constitutional: Positive for fatigue. Negative for chills and fever.   Cardiovascular: Positive for palpitations. Negative for chest pain.   Gastrointestinal: Negative for abdominal pain, constipation, diarrhea, nausea and vomiting.   Endocrine: Negative for cold intolerance and heat intolerance.   All other systems reviewed and are negative.      Objective       Vitals:    12/20/18 1556   BP: (!) 138/102   Pulse: 107   Weight: 99.1 kg (218 lb 6.4 oz)   Height: 167.6 cm (66\")     Body mass index is 35.25 kg/m².      Physical Exam   Constitutional: She is oriented to person, place, and time. She appears well-developed and well-nourished.   obese   HENT:   Head: Normocephalic and atraumatic.   Eyes: EOM are normal. Pupils are equal, round, and reactive to light.   Neck: Normal range of motion. Neck supple. No thyromegaly present.   Cardiovascular: Normal rate, regular rhythm, normal heart sounds and intact distal pulses.   Pulmonary/Chest: Effort normal and breath sounds normal.   Abdominal: Soft. Bowel sounds are normal. She exhibits no distension. There is no tenderness.   Musculoskeletal: Normal range of motion. She exhibits no edema.   Neurological: She is alert and oriented to person, place, and time. She has normal reflexes.   Skin: Skin is warm and dry. Rash noted.   Cluster of the vesicles noted over the left palm in the middle  Patient also has other areas of rash all on the left side over the scapula and upper arm   Psychiatric: She has a normal mood and affect. Her behavior is normal.   Nursing note and vitals reviewed.    Results Review:     I reviewed the patient's new clinical results.    Medical records reviewed  Summary:      Results Encounter on " 10/29/2018   Component Date Value Ref Range Status   • Glucose 11/05/2018 98  65 - 99 mg/dL Final   • BUN 11/05/2018 13  6 - 20 mg/dL Final   • Creatinine 11/05/2018 0.99  0.57 - 1.00 mg/dL Final   • eGFR Non African Am 11/05/2018 58* >60 mL/min/1.73 Final   • eGFR African Am 11/05/2018 70  >60 mL/min/1.73 Final   • BUN/Creatinine Ratio 11/05/2018 13.1  7.0 - 25.0 Final   • Sodium 11/05/2018 140  136 - 145 mmol/L Final   • Potassium 11/05/2018 4.4  3.5 - 5.2 mmol/L Final   • Chloride 11/05/2018 104  98 - 107 mmol/L Final   • Total CO2 11/05/2018 22.8  22.0 - 29.0 mmol/L Final   • Calcium 11/05/2018 9.4  8.6 - 10.5 mg/dL Final   • Total Protein 11/05/2018 6.9  6.0 - 8.5 g/dL Final   • Albumin 11/05/2018 4.20  3.50 - 5.20 g/dL Final   • Globulin 11/05/2018 2.7  gm/dL Final   • A/G Ratio 11/05/2018 1.6  g/dL Final   • Total Bilirubin 11/05/2018 0.3  0.1 - 1.2 mg/dL Final   • Alkaline Phosphatase 11/05/2018 171* 39 - 117 U/L Final   • AST (SGOT) 11/05/2018 21  1 - 32 U/L Final   • ALT (SGPT) 11/05/2018 20  1 - 33 U/L Final   • Free T4 11/05/2018 1.29  0.93 - 1.70 ng/dL Final   • TSH 11/05/2018 0.861  0.270 - 4.200 mIU/mL Final   • ACTH 11/05/2018 90.1* 7.2 - 63.3 pg/mL Final    ACTH reference interval for samples collected between 7 and 10 AM.   • 25 Hydroxy, Vitamin D 11/05/2018 >120.0* 30.0 - 100.0 ng/ml Final    Comment: Reference Range for Total Vitamin D 25(OH)  Deficiency    <20.0 ng/mL  Insufficiency 21-29 ng/mL  Sufficiency    ng/mL  Toxicity      >100 ng/ml        • Cortisol - AM 11/05/2018 33.7* 6.2 - 19.4 ug/dL Final     No results found for: HGBA1C  Lab Results   Component Value Date    CREATININE 0.99 11/05/2018     Imaging Results (most recent)     None                Assessment and Plan:    Eden was seen today for adrenal problem.    Diagnoses and all orders for this visit:    Herpes zoster with complication    Ethan's disease (CMS/HCC)  -     Comprehensive Metabolic Panel  -     CBC &  "Differential        Herpes zoster  Ethan's disease  Hypothyroidism    Close examination of the rash at least over the palm is very typical for a cluster of vescicles typical for herpetic infections  The rash over the shoulder and upper arm are scabbing over  The clinical history and progression is certainly suspicious for zoster possibly involving multiple dermatomes    Patient was advised valacyclovir next line she reported that she already has vesicular at home advised her to take 500 mg 3 times daily for 10 days  Advised her to continue the high-dose steroids for another 3 days and then decrease back to home regimen    She is planning to see her primary care physician once again in 2 days for monitoring the rash    Patient will keep the follow-up appointment is scheduled    The total time spent  was more than 25 min of which greater than 15 min of time (greater than 50% of the total time)  was spent face to face with the patient counseling and coordination of care on recommended evaluation and treatment options, instructions for management/treatment and /or follow up and importance of compliance with chosen management or treatment options  Rah Donahue MD. FACE    12/20/18      EMR Dragon / transcription disclaimer:     \"Dictated utilizing Dragon dictation\".         "

## 2018-12-21 ENCOUNTER — TELEPHONE (OUTPATIENT)
Dept: ENDOCRINOLOGY | Age: 58
End: 2018-12-21

## 2018-12-21 LAB
ALBUMIN SERPL-MCNC: 4.2 G/DL (ref 3.5–5.2)
ALBUMIN/GLOB SERPL: 1.6 G/DL
ALP SERPL-CCNC: 196 U/L (ref 39–117)
ALT SERPL-CCNC: 17 U/L (ref 1–33)
AST SERPL-CCNC: 17 U/L (ref 1–32)
BASOPHILS # BLD AUTO: 0.01 10*3/MM3 (ref 0–0.2)
BASOPHILS NFR BLD AUTO: 0.1 % (ref 0–1.5)
BILIRUB SERPL-MCNC: 0.2 MG/DL (ref 0.1–1.2)
BUN SERPL-MCNC: 18 MG/DL (ref 6–20)
BUN/CREAT SERPL: 13.8 (ref 7–25)
CALCIUM SERPL-MCNC: 9.4 MG/DL (ref 8.6–10.5)
CHLORIDE SERPL-SCNC: 106 MMOL/L (ref 98–107)
CO2 SERPL-SCNC: 20.7 MMOL/L (ref 22–29)
CREAT SERPL-MCNC: 1.3 MG/DL (ref 0.57–1)
EOSINOPHIL # BLD AUTO: 0.53 10*3/MM3 (ref 0–0.7)
EOSINOPHIL NFR BLD AUTO: 6.5 % (ref 0.3–6.2)
ERYTHROCYTE [DISTWIDTH] IN BLOOD BY AUTOMATED COUNT: 14.8 % (ref 11.7–13)
GLOBULIN SER CALC-MCNC: 2.7 GM/DL
GLUCOSE SERPL-MCNC: 120 MG/DL (ref 65–99)
HCT VFR BLD AUTO: 42.7 % (ref 35.6–45.5)
HGB BLD-MCNC: 13.6 G/DL (ref 11.9–15.5)
IMM GRANULOCYTES # BLD: 0.05 10*3/MM3 (ref 0–0.03)
IMM GRANULOCYTES NFR BLD: 0.6 % (ref 0–0.5)
LYMPHOCYTES # BLD AUTO: 1.03 10*3/MM3 (ref 0.9–4.8)
LYMPHOCYTES NFR BLD AUTO: 12.7 % (ref 19.6–45.3)
MCH RBC QN AUTO: 28.2 PG (ref 26.9–32)
MCHC RBC AUTO-ENTMCNC: 31.9 G/DL (ref 32.4–36.3)
MCV RBC AUTO: 88.4 FL (ref 80.5–98.2)
MONOCYTES # BLD AUTO: 0.46 10*3/MM3 (ref 0.2–1.2)
MONOCYTES NFR BLD AUTO: 5.7 % (ref 5–12)
NEUTROPHILS # BLD AUTO: 6.05 10*3/MM3 (ref 1.9–8.1)
NEUTROPHILS NFR BLD AUTO: 74.4 % (ref 42.7–76)
PLATELET # BLD AUTO: 189 10*3/MM3 (ref 140–500)
POTASSIUM SERPL-SCNC: 4.5 MMOL/L (ref 3.5–5.2)
PROT SERPL-MCNC: 6.9 G/DL (ref 6–8.5)
RBC # BLD AUTO: 4.83 10*6/MM3 (ref 3.9–5.2)
SODIUM SERPL-SCNC: 140 MMOL/L (ref 136–145)
WBC # BLD AUTO: 8.13 10*3/MM3 (ref 4.5–10.7)

## 2018-12-21 RX ORDER — GABAPENTIN 300 MG/1
300 CAPSULE ORAL 3 TIMES DAILY
Qty: 90 CAPSULE | Refills: 2 | Status: SHIPPED | OUTPATIENT
Start: 2018-12-21 | End: 2019-01-30 | Stop reason: HOSPADM

## 2018-12-21 RX ORDER — VALACYCLOVIR HYDROCHLORIDE 1 G/1
TABLET, FILM COATED ORAL
Qty: 30 TABLET | Refills: 0 | Status: SHIPPED | OUTPATIENT
Start: 2018-12-21 | End: 2019-01-07 | Stop reason: SDUPTHER

## 2018-12-21 NOTE — TELEPHONE ENCOUNTER
Spoke with patient about lab result pre Dr Donahue          ----- Message from Pauline Sam sent at 12/20/2018  5:09 PM EST -----  Patient did not check out today. I mailed AVS. I could not see instructions and the note is still open. She currently has appt with Dr. Abraham on 2-18-19, 2:15pm with lab appt 2-11-19, 9am. Are those acceptable?

## 2018-12-27 RX ORDER — LIOTHYRONINE SODIUM 5 UG/1
TABLET ORAL
Qty: 60 TABLET | Refills: 0 | Status: SHIPPED | OUTPATIENT
Start: 2018-12-27 | End: 2019-01-21 | Stop reason: SDUPTHER

## 2018-12-29 PROBLEM — B02.8 HERPES ZOSTER WITH COMPLICATION: Status: ACTIVE | Noted: 2018-12-29

## 2019-01-07 RX ORDER — VALACYCLOVIR HYDROCHLORIDE 1 G/1
TABLET, FILM COATED ORAL
Qty: 30 TABLET | Refills: 0 | Status: SHIPPED | OUTPATIENT
Start: 2019-01-07 | End: 2019-03-04

## 2019-01-21 RX ORDER — OMEPRAZOLE 20 MG/1
CAPSULE, DELAYED RELEASE ORAL
Qty: 30 CAPSULE | Refills: 0 | Status: SHIPPED | OUTPATIENT
Start: 2019-01-21 | End: 2019-02-25 | Stop reason: SDUPTHER

## 2019-01-22 RX ORDER — HYDROCORTISONE 10 MG/1
TABLET ORAL
Qty: 360 TABLET | Refills: 0 | Status: SHIPPED | OUTPATIENT
Start: 2019-01-22 | End: 2019-04-23 | Stop reason: SDUPTHER

## 2019-01-22 RX ORDER — LIOTHYRONINE SODIUM 5 UG/1
TABLET ORAL
Qty: 60 TABLET | Refills: 0 | Status: SHIPPED | OUTPATIENT
Start: 2019-01-22 | End: 2019-02-25 | Stop reason: SDUPTHER

## 2019-01-29 ENCOUNTER — HOSPITAL ENCOUNTER (OUTPATIENT)
Facility: HOSPITAL | Age: 59
Setting detail: OBSERVATION
Discharge: HOME | End: 2019-01-30
Attending: EMERGENCY MEDICINE | Admitting: NURSE PRACTITIONER

## 2019-01-29 DIAGNOSIS — R19.7 NAUSEA VOMITING AND DIARRHEA: ICD-10-CM

## 2019-01-29 DIAGNOSIS — E27.1 ADDISON'S DISEASE (HCC): Primary | ICD-10-CM

## 2019-01-29 DIAGNOSIS — R11.2 NAUSEA VOMITING AND DIARRHEA: ICD-10-CM

## 2019-01-29 PROBLEM — R76.0 ANTIPHOSPHOLIPID ANTIBODY POSITIVE: Status: ACTIVE | Noted: 2019-01-29

## 2019-01-29 PROBLEM — K44.9 HIATAL HERNIA: Status: ACTIVE | Noted: 2019-01-29

## 2019-01-29 PROBLEM — R11.0 NAUSEA: Status: ACTIVE | Noted: 2019-01-29

## 2019-01-29 PROBLEM — R51.9 HEADACHE: Status: ACTIVE | Noted: 2019-01-29

## 2019-01-29 LAB
ALBUMIN SERPL-MCNC: 4.1 G/DL (ref 3.5–5.2)
ALBUMIN/GLOB SERPL: 1.5 G/DL
ALP SERPL-CCNC: 146 U/L (ref 39–117)
ALT SERPL W P-5'-P-CCNC: 22 U/L (ref 1–33)
ANION GAP SERPL CALCULATED.3IONS-SCNC: 11.8 MMOL/L
AST SERPL-CCNC: 28 U/L (ref 1–32)
B PARAPERT DNA SPEC QL NAA+PROBE: NOT DETECTED
B PERT DNA SPEC QL NAA+PROBE: NOT DETECTED
BASOPHILS # BLD AUTO: 0.03 10*3/MM3 (ref 0–0.2)
BASOPHILS NFR BLD AUTO: 0.3 % (ref 0–1.5)
BILIRUB SERPL-MCNC: 0.3 MG/DL (ref 0.1–1.2)
BUN BLD-MCNC: 17 MG/DL (ref 6–20)
BUN/CREAT SERPL: 12.7 (ref 7–25)
C PNEUM DNA NPH QL NAA+NON-PROBE: NOT DETECTED
CALCIUM SPEC-SCNC: 9.5 MG/DL (ref 8.6–10.5)
CHLORIDE SERPL-SCNC: 105 MMOL/L (ref 98–107)
CO2 SERPL-SCNC: 24.2 MMOL/L (ref 22–29)
CREAT BLD-MCNC: 1.34 MG/DL (ref 0.57–1)
DEPRECATED RDW RBC AUTO: 50.5 FL (ref 37–54)
EOSINOPHIL # BLD AUTO: 0.35 10*3/MM3 (ref 0–0.7)
EOSINOPHIL NFR BLD AUTO: 4 % (ref 0.3–6.2)
ERYTHROCYTE [DISTWIDTH] IN BLOOD BY AUTOMATED COUNT: 15.6 % (ref 11.7–13)
FLUAV H1 2009 PAND RNA NPH QL NAA+PROBE: NOT DETECTED
FLUAV H1 HA GENE NPH QL NAA+PROBE: NOT DETECTED
FLUAV H3 RNA NPH QL NAA+PROBE: NOT DETECTED
FLUAV SUBTYP SPEC NAA+PROBE: NOT DETECTED
FLUBV RNA ISLT QL NAA+PROBE: NOT DETECTED
GFR SERPL CREATININE-BSD FRML MDRD: 41 ML/MIN/1.73
GLOBULIN UR ELPH-MCNC: 2.7 GM/DL
GLUCOSE BLD-MCNC: 100 MG/DL (ref 65–99)
HADV DNA SPEC NAA+PROBE: NOT DETECTED
HCOV 229E RNA SPEC QL NAA+PROBE: NOT DETECTED
HCOV HKU1 RNA SPEC QL NAA+PROBE: NOT DETECTED
HCOV NL63 RNA SPEC QL NAA+PROBE: NOT DETECTED
HCOV OC43 RNA SPEC QL NAA+PROBE: NOT DETECTED
HCT VFR BLD AUTO: 43.6 % (ref 35.6–45.5)
HGB BLD-MCNC: 13.9 G/DL (ref 11.9–15.5)
HMPV RNA NPH QL NAA+NON-PROBE: NOT DETECTED
HPIV1 RNA SPEC QL NAA+PROBE: NOT DETECTED
HPIV2 RNA SPEC QL NAA+PROBE: NOT DETECTED
HPIV3 RNA NPH QL NAA+PROBE: NOT DETECTED
HPIV4 P GENE NPH QL NAA+PROBE: NOT DETECTED
IMM GRANULOCYTES # BLD AUTO: 0.02 10*3/MM3 (ref 0–0.03)
IMM GRANULOCYTES NFR BLD AUTO: 0.2 % (ref 0–0.5)
INR PPP: 1.2 (ref 0.9–1.1)
LIPASE SERPL-CCNC: 54 U/L (ref 13–60)
LYMPHOCYTES # BLD AUTO: 1.57 10*3/MM3 (ref 0.9–4.8)
LYMPHOCYTES NFR BLD AUTO: 17.8 % (ref 19.6–45.3)
M PNEUMO IGG SER IA-ACNC: NOT DETECTED
MCH RBC QN AUTO: 28.5 PG (ref 26.9–32)
MCHC RBC AUTO-ENTMCNC: 31.9 G/DL (ref 32.4–36.3)
MCV RBC AUTO: 89.3 FL (ref 80.5–98.2)
MONOCYTES # BLD AUTO: 0.42 10*3/MM3 (ref 0.2–1.2)
MONOCYTES NFR BLD AUTO: 4.8 % (ref 5–12)
NEUTROPHILS # BLD AUTO: 6.44 10*3/MM3 (ref 1.9–8.1)
NEUTROPHILS NFR BLD AUTO: 72.9 % (ref 42.7–76)
PLATELET # BLD AUTO: 120 10*3/MM3 (ref 140–500)
PMV BLD AUTO: 11.4 FL (ref 6–12)
POTASSIUM BLD-SCNC: 4.4 MMOL/L (ref 3.5–5.2)
PROT SERPL-MCNC: 6.8 G/DL (ref 6–8.5)
PROTHROMBIN TIME: 15 SECONDS (ref 11.7–14.2)
RBC # BLD AUTO: 4.88 10*6/MM3 (ref 3.9–5.2)
RHINOVIRUS RNA SPEC NAA+PROBE: NOT DETECTED
RSV RNA NPH QL NAA+NON-PROBE: NOT DETECTED
SODIUM BLD-SCNC: 141 MMOL/L (ref 136–145)
WBC NRBC COR # BLD: 8.83 10*3/MM3 (ref 4.5–10.7)

## 2019-01-29 PROCEDURE — 96361 HYDRATE IV INFUSION ADD-ON: CPT

## 2019-01-29 PROCEDURE — 80053 COMPREHEN METABOLIC PANEL: CPT | Performed by: NURSE PRACTITIONER

## 2019-01-29 PROCEDURE — G0378 HOSPITAL OBSERVATION PER HR: HCPCS

## 2019-01-29 PROCEDURE — 87633 RESP VIRUS 12-25 TARGETS: CPT | Performed by: HOSPITALIST

## 2019-01-29 PROCEDURE — 96374 THER/PROPH/DIAG INJ IV PUSH: CPT

## 2019-01-29 PROCEDURE — 96376 TX/PRO/DX INJ SAME DRUG ADON: CPT

## 2019-01-29 PROCEDURE — 82542 COL CHROMOTOGRAPHY QUAL/QUAN: CPT | Performed by: NURSE PRACTITIONER

## 2019-01-29 PROCEDURE — 85610 PROTHROMBIN TIME: CPT | Performed by: NURSE PRACTITIONER

## 2019-01-29 PROCEDURE — 87798 DETECT AGENT NOS DNA AMP: CPT | Performed by: HOSPITALIST

## 2019-01-29 PROCEDURE — 99284 EMERGENCY DEPT VISIT MOD MDM: CPT

## 2019-01-29 PROCEDURE — 25010000003 HYDROCORTISONE SOD SUCCINATE PF 250 MG RECONSTITUTED SOLUTION: Performed by: NURSE PRACTITIONER

## 2019-01-29 PROCEDURE — 85025 COMPLETE CBC W/AUTO DIFF WBC: CPT | Performed by: NURSE PRACTITIONER

## 2019-01-29 PROCEDURE — 25010000002 HYDROCORTISONE SODIUM SUCCINATE 100 MG RECONSTITUTED SOLUTION: Performed by: HOSPITALIST

## 2019-01-29 PROCEDURE — 99215 OFFICE O/P EST HI 40 MIN: CPT | Performed by: INTERNAL MEDICINE

## 2019-01-29 PROCEDURE — 87581 M.PNEUMON DNA AMP PROBE: CPT | Performed by: HOSPITALIST

## 2019-01-29 PROCEDURE — 83690 ASSAY OF LIPASE: CPT | Performed by: NURSE PRACTITIONER

## 2019-01-29 PROCEDURE — 82024 ASSAY OF ACTH: CPT | Performed by: NURSE PRACTITIONER

## 2019-01-29 PROCEDURE — 87486 CHLMYD PNEUM DNA AMP PROBE: CPT | Performed by: HOSPITALIST

## 2019-01-29 RX ORDER — FLUDROCORTISONE ACETATE 0.1 MG/1
100 TABLET ORAL DAILY
Status: DISCONTINUED | OUTPATIENT
Start: 2019-01-29 | End: 2019-01-29

## 2019-01-29 RX ORDER — LIOTHYRONINE SODIUM 5 UG/1
5 TABLET ORAL 2 TIMES DAILY
Status: DISCONTINUED | OUTPATIENT
Start: 2019-01-29 | End: 2019-01-30 | Stop reason: HOSPADM

## 2019-01-29 RX ORDER — SODIUM CHLORIDE 9 MG/ML
50 INJECTION, SOLUTION INTRAVENOUS CONTINUOUS
Status: DISCONTINUED | OUTPATIENT
Start: 2019-01-29 | End: 2019-01-30 | Stop reason: HOSPADM

## 2019-01-29 RX ORDER — ONDANSETRON 4 MG/1
4 TABLET, FILM COATED ORAL EVERY 6 HOURS PRN
Status: DISCONTINUED | OUTPATIENT
Start: 2019-01-29 | End: 2019-01-30 | Stop reason: HOSPADM

## 2019-01-29 RX ORDER — SODIUM CHLORIDE 0.9 % (FLUSH) 0.9 %
3-10 SYRINGE (ML) INJECTION AS NEEDED
Status: DISCONTINUED | OUTPATIENT
Start: 2019-01-29 | End: 2019-01-30 | Stop reason: HOSPADM

## 2019-01-29 RX ORDER — ONDANSETRON 2 MG/ML
4 INJECTION INTRAMUSCULAR; INTRAVENOUS EVERY 6 HOURS PRN
Status: DISCONTINUED | OUTPATIENT
Start: 2019-01-29 | End: 2019-01-30 | Stop reason: HOSPADM

## 2019-01-29 RX ORDER — ACETAMINOPHEN 325 MG/1
650 TABLET ORAL EVERY 4 HOURS PRN
Status: DISCONTINUED | OUTPATIENT
Start: 2019-01-29 | End: 2019-01-30 | Stop reason: HOSPADM

## 2019-01-29 RX ORDER — LEVOTHYROXINE SODIUM 0.1 MG/1
100 TABLET ORAL
Status: DISCONTINUED | OUTPATIENT
Start: 2019-01-30 | End: 2019-01-30 | Stop reason: CLARIF

## 2019-01-29 RX ORDER — HYDROCORTISONE 10 MG/1
30 TABLET ORAL
Status: DISCONTINUED | OUTPATIENT
Start: 2019-01-30 | End: 2019-01-29

## 2019-01-29 RX ORDER — PANTOPRAZOLE SODIUM 40 MG/1
40 TABLET, DELAYED RELEASE ORAL EVERY MORNING
Status: DISCONTINUED | OUTPATIENT
Start: 2019-01-30 | End: 2019-01-30 | Stop reason: HOSPADM

## 2019-01-29 RX ORDER — SODIUM CHLORIDE 0.9 % (FLUSH) 0.9 %
3 SYRINGE (ML) INJECTION EVERY 12 HOURS SCHEDULED
Status: DISCONTINUED | OUTPATIENT
Start: 2019-01-29 | End: 2019-01-30 | Stop reason: HOSPADM

## 2019-01-29 RX ORDER — ONDANSETRON 4 MG/1
4 TABLET, ORALLY DISINTEGRATING ORAL EVERY 6 HOURS PRN
Status: DISCONTINUED | OUTPATIENT
Start: 2019-01-29 | End: 2019-01-30 | Stop reason: HOSPADM

## 2019-01-29 RX ADMIN — HYDROCORTISONE SODIUM SUCCINATE 100 MG: 250 INJECTION, POWDER, FOR SOLUTION INTRAMUSCULAR; INTRAVENOUS at 12:53

## 2019-01-29 RX ADMIN — Medication 3 ML: at 20:48

## 2019-01-29 RX ADMIN — SODIUM CHLORIDE 1000 ML: 9 INJECTION, SOLUTION INTRAVENOUS at 12:57

## 2019-01-29 RX ADMIN — SODIUM CHLORIDE 100 ML/HR: 9 INJECTION, SOLUTION INTRAVENOUS at 16:28

## 2019-01-29 RX ADMIN — HYDROCORTISONE SODIUM SUCCINATE 50 MG: 100 INJECTION, POWDER, FOR SOLUTION INTRAMUSCULAR; INTRAVENOUS at 20:48

## 2019-01-29 RX ADMIN — LIOTHYRONINE SODIUM 5 MCG: 5 TABLET ORAL at 20:48

## 2019-01-29 RX ADMIN — RIVAROXABAN 20 MG: 20 TABLET, FILM COATED ORAL at 20:48

## 2019-01-30 VITALS
WEIGHT: 220.8 LBS | OXYGEN SATURATION: 95 % | HEART RATE: 69 BPM | SYSTOLIC BLOOD PRESSURE: 115 MMHG | DIASTOLIC BLOOD PRESSURE: 68 MMHG | RESPIRATION RATE: 16 BRPM | HEIGHT: 66 IN | BODY MASS INDEX: 35.48 KG/M2 | TEMPERATURE: 98 F

## 2019-01-30 LAB
ACTH PLAS-MCNC: 26.8 PG/ML (ref 7.2–63.3)
ANION GAP SERPL CALCULATED.3IONS-SCNC: 13.5 MMOL/L
BASOPHILS # BLD AUTO: 0.02 10*3/MM3 (ref 0–0.2)
BASOPHILS NFR BLD AUTO: 0.2 % (ref 0–1.5)
BUN BLD-MCNC: 17 MG/DL (ref 6–20)
BUN/CREAT SERPL: 15.3 (ref 7–25)
CALCIUM SPEC-SCNC: 8.6 MG/DL (ref 8.6–10.5)
CHLORIDE SERPL-SCNC: 108 MMOL/L (ref 98–107)
CO2 SERPL-SCNC: 21.5 MMOL/L (ref 22–29)
CREAT BLD-MCNC: 1.11 MG/DL (ref 0.57–1)
DEPRECATED RDW RBC AUTO: 49.6 FL (ref 37–54)
EOSINOPHIL # BLD AUTO: 0.2 10*3/MM3 (ref 0–0.7)
EOSINOPHIL NFR BLD AUTO: 2.3 % (ref 0.3–6.2)
ERYTHROCYTE [DISTWIDTH] IN BLOOD BY AUTOMATED COUNT: 15.4 % (ref 11.7–13)
GFR SERPL CREATININE-BSD FRML MDRD: 51 ML/MIN/1.73
GLUCOSE BLD-MCNC: 115 MG/DL (ref 65–99)
HCT VFR BLD AUTO: 37.5 % (ref 35.6–45.5)
HGB BLD-MCNC: 12 G/DL (ref 11.9–15.5)
IMM GRANULOCYTES # BLD AUTO: 0.02 10*3/MM3 (ref 0–0.03)
IMM GRANULOCYTES NFR BLD AUTO: 0.2 % (ref 0–0.5)
LYMPHOCYTES # BLD AUTO: 2.02 10*3/MM3 (ref 0.9–4.8)
LYMPHOCYTES NFR BLD AUTO: 23.4 % (ref 19.6–45.3)
MCH RBC QN AUTO: 28.3 PG (ref 26.9–32)
MCHC RBC AUTO-ENTMCNC: 32 G/DL (ref 32.4–36.3)
MCV RBC AUTO: 88.4 FL (ref 80.5–98.2)
MONOCYTES # BLD AUTO: 0.47 10*3/MM3 (ref 0.2–1.2)
MONOCYTES NFR BLD AUTO: 5.4 % (ref 5–12)
NEUTROPHILS # BLD AUTO: 5.94 10*3/MM3 (ref 1.9–8.1)
NEUTROPHILS NFR BLD AUTO: 68.7 % (ref 42.7–76)
PLATELET # BLD AUTO: 119 10*3/MM3 (ref 140–500)
PMV BLD AUTO: 11.2 FL (ref 6–12)
POTASSIUM BLD-SCNC: 4.2 MMOL/L (ref 3.5–5.2)
RBC # BLD AUTO: 4.24 10*6/MM3 (ref 3.9–5.2)
SODIUM BLD-SCNC: 143 MMOL/L (ref 136–145)
WBC NRBC COR # BLD: 8.65 10*3/MM3 (ref 4.5–10.7)

## 2019-01-30 PROCEDURE — 36415 COLL VENOUS BLD VENIPUNCTURE: CPT | Performed by: HOSPITALIST

## 2019-01-30 PROCEDURE — 80048 BASIC METABOLIC PNL TOTAL CA: CPT | Performed by: HOSPITALIST

## 2019-01-30 PROCEDURE — 96376 TX/PRO/DX INJ SAME DRUG ADON: CPT

## 2019-01-30 PROCEDURE — 25010000002 HYDROCORTISONE SODIUM SUCCINATE 100 MG RECONSTITUTED SOLUTION: Performed by: HOSPITALIST

## 2019-01-30 PROCEDURE — 85025 COMPLETE CBC W/AUTO DIFF WBC: CPT | Performed by: HOSPITALIST

## 2019-01-30 PROCEDURE — G0378 HOSPITAL OBSERVATION PER HR: HCPCS

## 2019-01-30 RX ORDER — HYDROCORTISONE 10 MG/1
20 TABLET ORAL
Status: DISCONTINUED | OUTPATIENT
Start: 2019-01-30 | End: 2019-01-30 | Stop reason: HOSPADM

## 2019-01-30 RX ORDER — LEVOTHYROXINE SODIUM 50 UG/1
100 CAPSULE ORAL
Status: DISCONTINUED | OUTPATIENT
Start: 2019-01-30 | End: 2019-01-30 | Stop reason: HOSPADM

## 2019-01-30 RX ADMIN — HYDROCORTISONE SODIUM SUCCINATE 50 MG: 100 INJECTION, POWDER, FOR SOLUTION INTRAMUSCULAR; INTRAVENOUS at 03:35

## 2019-01-30 RX ADMIN — LEVOTHYROXINE SODIUM 100 MCG: 50 CAPSULE ORAL at 08:38

## 2019-01-30 RX ADMIN — LIOTHYRONINE SODIUM 5 MCG: 5 TABLET ORAL at 08:36

## 2019-01-30 RX ADMIN — HYDROCORTISONE 20 MG: 10 TABLET ORAL at 11:39

## 2019-02-01 LAB — CORTISONE SERPL-MCNC: 1.83 UG/DL

## 2019-02-07 ENCOUNTER — APPOINTMENT (OUTPATIENT)
Dept: GENERAL RADIOLOGY | Facility: HOSPITAL | Age: 59
End: 2019-02-07

## 2019-02-07 ENCOUNTER — HOSPITAL ENCOUNTER (EMERGENCY)
Facility: HOSPITAL | Age: 59
Discharge: HOME | End: 2019-02-07
Attending: EMERGENCY MEDICINE | Admitting: EMERGENCY MEDICINE

## 2019-02-07 ENCOUNTER — APPOINTMENT (OUTPATIENT)
Dept: CT IMAGING | Facility: HOSPITAL | Age: 59
End: 2019-02-07

## 2019-02-07 VITALS
DIASTOLIC BLOOD PRESSURE: 73 MMHG | WEIGHT: 200 LBS | RESPIRATION RATE: 18 BRPM | HEART RATE: 99 BPM | SYSTOLIC BLOOD PRESSURE: 130 MMHG | HEIGHT: 66 IN | OXYGEN SATURATION: 95 % | BODY MASS INDEX: 32.14 KG/M2 | TEMPERATURE: 99.6 F

## 2019-02-07 DIAGNOSIS — E86.0 DEHYDRATION: Primary | ICD-10-CM

## 2019-02-07 DIAGNOSIS — E27.1 ADDISON'S DISEASE (HCC): ICD-10-CM

## 2019-02-07 DIAGNOSIS — R11.2 NON-INTRACTABLE VOMITING WITH NAUSEA, UNSPECIFIED VOMITING TYPE: ICD-10-CM

## 2019-02-07 LAB
ALBUMIN SERPL-MCNC: 4 G/DL (ref 3.5–5.2)
ALBUMIN/GLOB SERPL: 1.4 G/DL
ALP SERPL-CCNC: 130 U/L (ref 39–117)
ALT SERPL W P-5'-P-CCNC: 22 U/L (ref 1–33)
AMPHET+METHAMPHET UR QL: POSITIVE
ANION GAP SERPL CALCULATED.3IONS-SCNC: 13 MMOL/L
AST SERPL-CCNC: 29 U/L (ref 1–32)
BACTERIA UR QL AUTO: ABNORMAL /HPF
BARBITURATES UR QL SCN: NEGATIVE
BASOPHILS # BLD AUTO: 0.02 10*3/MM3 (ref 0–0.2)
BASOPHILS NFR BLD AUTO: 0.3 % (ref 0–1.5)
BENZODIAZ UR QL SCN: NEGATIVE
BILIRUB SERPL-MCNC: 0.4 MG/DL (ref 0.1–1.2)
BILIRUB UR QL STRIP: NEGATIVE
BUN BLD-MCNC: 11 MG/DL (ref 6–20)
BUN/CREAT SERPL: 10.9 (ref 7–25)
CALCIUM SPEC-SCNC: 9.2 MG/DL (ref 8.6–10.5)
CANNABINOIDS SERPL QL: NEGATIVE
CHLORIDE SERPL-SCNC: 105 MMOL/L (ref 98–107)
CLARITY UR: CLEAR
CO2 SERPL-SCNC: 22 MMOL/L (ref 22–29)
COCAINE UR QL: NEGATIVE
COLOR UR: YELLOW
CREAT BLD-MCNC: 1.01 MG/DL (ref 0.57–1)
DEPRECATED RDW RBC AUTO: 49.3 FL (ref 37–54)
EOSINOPHIL # BLD AUTO: 0.31 10*3/MM3 (ref 0–0.7)
EOSINOPHIL NFR BLD AUTO: 4.1 % (ref 0.3–6.2)
ERYTHROCYTE [DISTWIDTH] IN BLOOD BY AUTOMATED COUNT: 15.3 % (ref 11.7–13)
ETHANOL BLD-MCNC: <10 MG/DL (ref 0–10)
ETHANOL UR QL: <0.01 %
GFR SERPL CREATININE-BSD FRML MDRD: 57 ML/MIN/1.73
GLOBULIN UR ELPH-MCNC: 2.8 GM/DL
GLUCOSE BLD-MCNC: 105 MG/DL (ref 65–99)
GLUCOSE UR STRIP-MCNC: NEGATIVE MG/DL
HCT VFR BLD AUTO: 41.4 % (ref 35.6–45.5)
HGB BLD-MCNC: 13.4 G/DL (ref 11.9–15.5)
HGB UR QL STRIP.AUTO: NEGATIVE
HOLD SPECIMEN: NORMAL
HOLD SPECIMEN: NORMAL
HYALINE CASTS UR QL AUTO: ABNORMAL /LPF
IMM GRANULOCYTES # BLD AUTO: 0.02 10*3/MM3 (ref 0–0.03)
IMM GRANULOCYTES NFR BLD AUTO: 0.3 % (ref 0–0.5)
INR PPP: 1.05 (ref 0.9–1.1)
KETONES UR QL STRIP: ABNORMAL
LEUKOCYTE ESTERASE UR QL STRIP.AUTO: ABNORMAL
LIPASE SERPL-CCNC: 31 U/L (ref 13–60)
LYMPHOCYTES # BLD AUTO: 1.26 10*3/MM3 (ref 0.9–4.8)
LYMPHOCYTES NFR BLD AUTO: 16.7 % (ref 19.6–45.3)
MCH RBC QN AUTO: 28.6 PG (ref 26.9–32)
MCHC RBC AUTO-ENTMCNC: 32.4 G/DL (ref 32.4–36.3)
MCV RBC AUTO: 88.3 FL (ref 80.5–98.2)
METHADONE UR QL SCN: NEGATIVE
MONOCYTES # BLD AUTO: 0.45 10*3/MM3 (ref 0.2–1.2)
MONOCYTES NFR BLD AUTO: 6 % (ref 5–12)
NEUTROPHILS # BLD AUTO: 5.48 10*3/MM3 (ref 1.9–8.1)
NEUTROPHILS NFR BLD AUTO: 72.6 % (ref 42.7–76)
NITRITE UR QL STRIP: NEGATIVE
OPIATES UR QL: NEGATIVE
OXYCODONE UR QL SCN: NEGATIVE
PH UR STRIP.AUTO: <=5 [PH] (ref 5–8)
PLATELET # BLD AUTO: 113 10*3/MM3 (ref 140–500)
PMV BLD AUTO: 10.9 FL (ref 6–12)
POTASSIUM BLD-SCNC: 4.3 MMOL/L (ref 3.5–5.2)
PROT SERPL-MCNC: 6.8 G/DL (ref 6–8.5)
PROT UR QL STRIP: NEGATIVE
PROTHROMBIN TIME: 13.5 SECONDS (ref 11.7–14.2)
RBC # BLD AUTO: 4.69 10*6/MM3 (ref 3.9–5.2)
RBC # UR: ABNORMAL /HPF
REF LAB TEST METHOD: ABNORMAL
SODIUM BLD-SCNC: 140 MMOL/L (ref 136–145)
SP GR UR STRIP: 1.01 (ref 1–1.03)
SQUAMOUS #/AREA URNS HPF: ABNORMAL /HPF
TROPONIN T SERPL-MCNC: <0.01 NG/ML (ref 0–0.03)
UROBILINOGEN UR QL STRIP: ABNORMAL
WBC NRBC COR # BLD: 7.54 10*3/MM3 (ref 4.5–10.7)
WBC UR QL AUTO: ABNORMAL /HPF
WHOLE BLOOD HOLD SPECIMEN: NORMAL
WHOLE BLOOD HOLD SPECIMEN: NORMAL

## 2019-02-07 PROCEDURE — 93010 ELECTROCARDIOGRAM REPORT: CPT | Performed by: INTERNAL MEDICINE

## 2019-02-07 PROCEDURE — 80307 DRUG TEST PRSMV CHEM ANLYZR: CPT | Performed by: EMERGENCY MEDICINE

## 2019-02-07 PROCEDURE — 80053 COMPREHEN METABOLIC PANEL: CPT | Performed by: EMERGENCY MEDICINE

## 2019-02-07 PROCEDURE — 96374 THER/PROPH/DIAG INJ IV PUSH: CPT

## 2019-02-07 PROCEDURE — 85025 COMPLETE CBC W/AUTO DIFF WBC: CPT | Performed by: EMERGENCY MEDICINE

## 2019-02-07 PROCEDURE — 96361 HYDRATE IV INFUSION ADD-ON: CPT

## 2019-02-07 PROCEDURE — 25010000003 HYDROCORTISONE SOD SUCCINATE PF 250 MG RECONSTITUTED SOLUTION: Performed by: EMERGENCY MEDICINE

## 2019-02-07 PROCEDURE — 93005 ELECTROCARDIOGRAM TRACING: CPT | Performed by: EMERGENCY MEDICINE

## 2019-02-07 PROCEDURE — 99284 EMERGENCY DEPT VISIT MOD MDM: CPT

## 2019-02-07 PROCEDURE — 83690 ASSAY OF LIPASE: CPT | Performed by: EMERGENCY MEDICINE

## 2019-02-07 PROCEDURE — 71046 X-RAY EXAM CHEST 2 VIEWS: CPT

## 2019-02-07 PROCEDURE — 84484 ASSAY OF TROPONIN QUANT: CPT | Performed by: EMERGENCY MEDICINE

## 2019-02-07 PROCEDURE — 81001 URINALYSIS AUTO W/SCOPE: CPT | Performed by: EMERGENCY MEDICINE

## 2019-02-07 PROCEDURE — 70450 CT HEAD/BRAIN W/O DYE: CPT

## 2019-02-07 PROCEDURE — 36415 COLL VENOUS BLD VENIPUNCTURE: CPT

## 2019-02-07 PROCEDURE — 85610 PROTHROMBIN TIME: CPT | Performed by: EMERGENCY MEDICINE

## 2019-02-07 RX ORDER — SODIUM CHLORIDE 0.9 % (FLUSH) 0.9 %
10 SYRINGE (ML) INJECTION AS NEEDED
Status: DISCONTINUED | OUTPATIENT
Start: 2019-02-07 | End: 2019-02-07 | Stop reason: HOSPADM

## 2019-02-07 RX ORDER — ONDANSETRON 4 MG/1
4 TABLET, ORALLY DISINTEGRATING ORAL EVERY 6 HOURS PRN
Qty: 10 TABLET | Refills: 0 | Status: SHIPPED | OUTPATIENT
Start: 2019-02-07 | End: 2019-03-04

## 2019-02-07 RX ADMIN — SODIUM CHLORIDE 1000 ML: 9 INJECTION, SOLUTION INTRAVENOUS at 13:47

## 2019-02-07 RX ADMIN — HYDROCORTISONE SODIUM SUCCINATE 100 MG: 250 INJECTION, POWDER, FOR SOLUTION INTRAMUSCULAR; INTRAVENOUS at 13:48

## 2019-02-07 NOTE — DISCHARGE INSTRUCTIONS
Drink plenty of fluids and rest today.  Use the Zofran as needed and follow up with your endocrinologist.  Please return to the ED if symptoms worsen.

## 2019-02-07 NOTE — ED NOTES
"Patient's family reports the patient vomited this morning. Reports the patient has been confused and lethargic 2 days ago. 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       Maribel Chawla, RN  02/07/19 2934    "

## 2019-02-07 NOTE — ED NOTES
"Patient in room 38. Family at bedside.   Patient reports having [place]'s Disease, patient c/o being weak, confused, fatigue and a headache. Patient is alert and oriented x 4 however keeps asking her  the same questions, \"why am I here\", patient has a occipital headache with constant ache, denies any associated s/s with the headache. Patient is very soft spoken and wishes to have her  speak for her.     Patient denies CP, SOA, or any other s/s at this time. Patient in NAD at this time, VSS, call light within reach, patient alert.      Tamela Peter, RN  02/07/19 1154    "

## 2019-02-07 NOTE — ED PROVIDER NOTES
EMERGENCY DEPARTMENT ENCOUNTER    CHIEF COMPLAINT  Chief Complaint: Generalized weakness  History given by: Pt and family  History limited by: Nothing  Room Number: 38/38  PMD: Lorne Lyle DO      HPI:  Pt is a 58 y.o. female who presents complaining of generalized weakness which began 2-3 days ago. The pt confirms confusion, vomiting, lethargy, and finger numbness (pt states is random, not unilateral), and denies fever, CP, SOA, diarrhea, leg pain, leg swelling, rash, sore throat, myalgias, cough, or neck pain. The pt was scheduled to have an appointment with her endocrinologist this morning but came to the ER since she vomited. The pt and her family believe that she is having an Addisons crisis. The pt has not missed any doses of her medications.     Duration:  2-3 days  Onset: Gradual  Timing: Constant  Location: General  Quality: Weak, lethargic  Intensity/Severity: Moderate  Progression: Worsening  Associated Symptoms: Confusion, vomiting, lethargy, finger numbness  Aggravating Factors: Unknown  Alleviating Factors: Unknown  Previous Episodes: The pt has a h/o Goliad's disease and migraine.   Treatment before arrival: None    PAST MEDICAL HISTORY  Active Ambulatory Problems     Diagnosis Date Noted   • [place]'s disease (CMS/[facility]) 02/04/2016   • Chronic adrenal insufficiency (CMS/[facility]) 02/04/2016   • Antiphospholipid syndrome (CMS/[facility]) 02/04/2016   • Dyskinesia of esophagus 02/04/2016   • Simple obesity 02/04/2016   • Fatigue 02/04/2016   • Gastroesophageal reflux disease 02/04/2016   • Loss of hair 02/04/2016   • Herpes simplex type 1 infection 02/04/2016   • Mineralocorticoid deficiency (CMS/[facility]) 02/04/2016   • Muscle weakness 02/04/2016   • Menopausal and postmenopausal disorder 02/04/2016   • Renal insufficiency 02/04/2016   • Emotional stress reaction 02/04/2016   • Cerebrovascular accident (CMS/[facility]) 02/04/2016   • Hypothyroidism (acquired) 03/01/2016   • Deep vein thrombosis (CMS/[facility]) 06/24/2013   •  Erosive esophagitis 01/19/2014   • Abnormal weight gain 08/02/2017   • Intractable vomiting with nausea 08/17/2017   • Diarrhea 08/17/2017   • Long term (current) use of anticoagulants 08/17/2017   • Thrombocytopenia (CMS/HCC) 08/17/2017   • Gastroenteritis 08/18/2017   • Attention deficit hyperactivity disorder (ADHD), predominantly inattentive type 10/04/2017   • Herpes zoster with complication 12/29/2018   • Antiphospholipid antibody positive 01/29/2019   • Hiatal hernia 01/29/2019   • Diarrhea 01/29/2019   • Nausea 01/29/2019   • Headache 01/29/2019     Resolved Ambulatory Problems     Diagnosis Date Noted   • Abdominal pain 02/04/2016   • Abnormal weight gain 02/04/2016   • Acute upper respiratory infection 02/04/2016   • Abnormal liver function tests 02/04/2016   • Calculus of gallbladder 02/04/2016   • Hyperkalemia 02/04/2016   • Hypothyroidism 02/04/2016   • Nausea 02/04/2016   • Candidiasis of vagina 02/04/2016     Past Medical History:   Diagnosis Date   • Easton's disease (CMS/HCC)    • Amaurosis fugax    • Antiphospholipid antibody positive    • Atrial premature complex    • Breast lump    • Cyst, breast    • Exogenous obesity    • Headache    • Hemianopsia    • Hiatal hernia    • Hx of bladder infections    • Hypothyroidism    • Migraine    • Nephrolithiasis    • Onset of menses    • Pregnancy    • PVC's (premature ventricular contractions)    • Renal calculi    • Stress reaction    • Thrombocytopenia (CMS/HCC)    • Thyroid disease    • Tinnitus    • Ventricular tachycardia (CMS/HCC)    • Vertigo        PAST SURGICAL HISTORY  Past Surgical History:   Procedure Laterality Date   • COLONOSCOPY     • DILATATION AND CURETTAGE      d&C with CCK secondary to HPV in 1990   • HYSTERECTOMY     • UPPER GASTROINTESTINAL ENDOSCOPY         FAMILY HISTORY  Family History   Problem Relation Age of Onset   • Breast cancer Daughter    • Clotting disorder Other    • Hypothyroidism Other    • Hypertension Other         SOCIAL HISTORY  Social History     Socioeconomic History   • Marital status:      Spouse name: Not on file   • Number of children: Not on file   • Years of education: Not on file   • Highest education level: Not on file   Social Needs   • Financial resource strain: Not on file   • Food insecurity - worry: Not on file   • Food insecurity - inability: Not on file   • Transportation needs - medical: Not on file   • Transportation needs - non-medical: Not on file   Occupational History   • Not on file   Tobacco Use   • Smoking status: Never Smoker   • Smokeless tobacco: Never Used   Substance and Sexual Activity   • Alcohol use: No   • Drug use: No   • Sexual activity: Defer   Other Topics Concern   • Not on file   Social History Narrative   • Not on file       ALLERGIES  Heparin; Adhesive tape; and Latex    REVIEW OF SYSTEMS  Review of Systems   Constitutional: Positive for fatigue. Negative for fever.   HENT: Negative for sore throat.    Eyes: Negative.    Respiratory: Negative for cough and shortness of breath.    Cardiovascular: Negative for chest pain.   Gastrointestinal: Positive for vomiting. Negative for abdominal pain and diarrhea.   Genitourinary: Negative for dysuria.   Musculoskeletal: Negative for neck pain.   Skin: Negative for rash.   Neurological: Positive for weakness (General) and numbness (Fingers). Negative for headaches.   Hematological: Negative.    Psychiatric/Behavioral: Negative.    All other systems reviewed and are negative.      PHYSICAL EXAM  ED Triage Vitals   Temp Heart Rate Resp BP SpO2   02/07/19 1040 02/07/19 1040 02/07/19 1040 02/07/19 1120 02/07/19 1040   99.6 °F (37.6 °C) 101 18 129/84 96 %      Temp src Heart Rate Source Patient Position BP Location FiO2 (%)   02/07/19 1040 -- -- -- --   Tympanic           Physical Exam   Constitutional: She is oriented to person, place, and time. She appears distressed (Mild).   Mild confusion   HENT:   Head: Normocephalic and  atraumatic.   Mouth/Throat: Oropharynx is clear and moist.   Sinuses nontender   Eyes: EOM are normal. Pupils are equal, round, and reactive to light.   Neck: Normal range of motion. Neck supple.   Cardiovascular: Normal rate, regular rhythm and normal heart sounds.   No murmur heard.  Pulmonary/Chest: Effort normal and breath sounds normal. No respiratory distress.   Abdominal: Soft. Bowel sounds are normal. She exhibits no distension. There is no tenderness. There is no rebound and no guarding.   Musculoskeletal: Normal range of motion. She exhibits no edema.   Lymphadenopathy:     She has no cervical adenopathy.   Neurological: She is alert and oriented to person, place, and time. She has normal sensation and normal strength.   NIHSS 0   Skin: Skin is warm and dry. No rash noted.   Psychiatric: Mood and affect normal.   Nursing note and vitals reviewed.      LAB RESULTS  Lab Results (last 24 hours)     Procedure Component Value Units Date/Time    CBC & Differential [[id number]] Collected:  02/07/19 1135    Specimen:  Blood Updated:  02/07/19 1315    Narrative:       The following orders were created for panel order CBC & Differential.  Procedure                               Abnormality         Status                     ---------                               -----------         ------                     CBC Auto Differential[[id number]]        Abnormal            Final result                 Please view results for these tests on the individual orders.    Comprehensive Metabolic Panel [[id number]]  (Abnormal) Collected:  02/07/19 1135    Specimen:  Blood from Arm, Right Updated:  02/07/19 1330     Glucose 105 mg/dL      BUN 11 mg/dL      Creatinine 1.01 mg/dL      Sodium 140 mmol/L      Potassium 4.3 mmol/L      Chloride 105 mmol/L      CO2 22.0 mmol/L      Calcium 9.2 mg/dL      Total Protein 6.8 g/dL      Albumin 4.00 g/dL      ALT (SGPT) 22 U/L      AST (SGOT) 29 U/L      Comment: Specimen hemolyzed.  Results  may be affected.        Alkaline Phosphatase 130 U/L      Total Bilirubin 0.4 mg/dL      eGFR Non African Amer 57 mL/min/1.73      Globulin 2.8 gm/dL      A/G Ratio 1.4 g/dL      BUN/Creatinine Ratio 10.9     Anion Gap 13.0 mmol/L     Lipase [[id number]]  (Normal) Collected:  02/07/19 1135    Specimen:  Blood from Arm, Right Updated:  02/07/19 1328     Lipase 31 U/L     Protime-INR [[id number]]  (Normal) Collected:  02/07/19 1135    Specimen:  Blood from Arm, Right Updated:  02/07/19 1319     Protime 13.5 Seconds      INR 1.05    Troponin [[id number]]  (Normal) Collected:  02/07/19 1135    Specimen:  Blood from Arm, Right Updated:  02/07/19 1328     Troponin T <0.010 ng/mL     Narrative:       Troponin T Reference Range:  <= 0.03 ng/mL-   Negative for AMI  >0.03 ng/mL-     Abnormal for myocardial necrosis.  Clinicians would have to utilize clinical acumen, EKG, Troponin and serial changes to determine if it is an Acute Myocardial Infarction or myocardial injury due to an underlying chronic condition.     CBC Auto Differential [[id number]]  (Abnormal) Collected:  02/07/19 1135    Specimen:  Blood from Arm, Right Updated:  02/07/19 1315     WBC 7.54 10*3/mm3      RBC 4.69 10*6/mm3      Hemoglobin 13.4 g/dL      Hematocrit 41.4 %      MCV 88.3 fL      MCH 28.6 pg      MCHC 32.4 g/dL      RDW 15.3 %      RDW-SD 49.3 fl      MPV 10.9 fL      Platelets 113 10*3/mm3      Neutrophil % 72.6 %      Lymphocyte % 16.7 %      Monocyte % 6.0 %      Eosinophil % 4.1 %      Basophil % 0.3 %      Immature Grans % 0.3 %      Neutrophils, Absolute 5.48 10*3/mm3      Lymphocytes, Absolute 1.26 10*3/mm3      Monocytes, Absolute 0.45 10*3/mm3      Eosinophils, Absolute 0.31 10*3/mm3      Basophils, Absolute 0.02 10*3/mm3      Immature Grans, Absolute 0.02 10*3/mm3     Ethanol [[id number]] Collected:  02/07/19 1135    Specimen:  Blood from Arm, Right Updated:  02/07/19 1429     Ethanol <10 mg/dL      Ethanol % <0.010 %     Urinalysis With  Microscopic If Indicated (No Culture) - Urine, Clean Catch [[id number]]  (Abnormal) Collected:  02/07/19 1347    Specimen:  Urine, Clean Catch Updated:  02/07/19 1420     Color, UA Yellow     Appearance, UA Clear     pH, UA <=5.0     Specific Gravity, UA 1.013     Glucose, UA Negative     Ketones, UA 15 mg/dL (1+)     Bilirubin, UA Negative     Blood, UA Negative     Protein, UA Negative     Leuk Esterase, UA Small (1+)     Nitrite, UA Negative     Urobilinogen, UA 0.2 E.U./dL    Urinalysis, Microscopic Only - Urine, Clean Catch [[id number]]  (Abnormal) Collected:  02/07/19 1347    Specimen:  Urine, Clean Catch Updated:  02/07/19 1420     RBC, UA 0-2 /HPF      WBC, UA 3-5 /HPF      Bacteria, UA None Seen /HPF      Squamous Epithelial Cells, UA 0-2 /HPF      Hyaline Casts, UA 0-2 /LPF      Methodology Automated Microscopy    Urine Drug Screen - Urine, Clean Catch [[id number]]  (Abnormal) Collected:  02/07/19 1347    Specimen:  Urine, Clean Catch Updated:  02/07/19 1505     Amphet/Methamphet, Screen Positive     Barbiturates Screen, Urine Negative     Benzodiazepine Screen, Urine Negative     Cocaine Screen, Urine Negative     Opiate Screen Negative     THC, Screen, Urine Negative     Methadone Screen, Urine Negative     Oxycodone Screen, Urine Negative    Narrative:       Negative Thresholds For Drugs Screened:     Amphetamines               500 ng/ml   Barbiturates               200 ng/ml   Benzodiazepines            100 ng/ml   Cocaine                    300 ng/ml   Methadone                  300 ng/ml   Opiates                    300 ng/ml   Oxycodone                  100 ng/ml   THC                        50 ng/ml    The Normal Value for all drugs tested is negative. This report includes final unconfirmed screening results to be used for medical treatment purposes only. Unconfirmed results must not be used for non-medical purposes such as employment or legal testing. Clinical consideration should be applied to any  drug of abuse test, particulary when unconfirmed results are used.          I ordered the above labs and reviewed the results    RADIOLOGY  XR Chest 2 View   Final Result   FINDINGS:  The lungs are well-expanded and clear, and the heart and  hilar structures are normal. There is no acute disease or change from  02/04/2016.     This report was finalized on 2/7/2019 2:40 PM by Dr. Roger Ware M.D.   CT Head Without Contrast      FINDINGS: A small remote infarct is appreciated involving the right  parietal lobe laterally previously. There is no evidence of intracranial  hemorrhage, hydrocephalus or of abnormal extra-axial fluid. No focal  area of decreased attenuation to suggest acute infarction is identified.  Further evaluation could be performed with MRI examination of the brain  as indicated.        Radiation dose reduction techniques were utilized, including automated  exposure control and exposure modulation based on body size.        I ordered the above noted radiological studies. Interpreted by radiologist. Reviewed by me in PACS.       PROCEDURES  Procedures  NIH Stroke Scale      Interval: Baseline  Time: 4:52 PM  Person Administering Scale: Beatriz Hanson    Administer stroke scale items in the order listed. Record performance in each category after each subscale exam. Do not go back and change scores. Follow directions provided for each exam technique. Scores should reflect what the patient does, not what the clinician thinks the patient can do. The clinician should record answers while administering the exam and work quickly. Except where indicated, the patient should not be coached (i.e., repeated requests to patient to make a special effort).      1a  Level of consciousness: 0=alert; keenly responsive   1b. LOC questions:  0=Performs both tasks correctly   1c. LOC commands: 0=Performs both tasks correctly   2.  Best Gaze: 0=normal   3.  Visual: 0=No visual loss   4. Facial Palsy: 0=Normal symmetric  movement   5a.  Motor left arm: 0=No drift, limb holds 90 (or 45) degrees for full 10 seconds   5b.  Motor right arm: 0=No drift, limb holds 90 (or 45) degrees for full 10 seconds   6a. motor left le=No drift, limb holds 90 (or 45) degrees for full 10 seconds   6b  Motor right le=No drift, limb holds 90 (or 45) degrees for full 10 seconds   7. Limb Ataxia: 0=Absent   8.  Sensory: 0=Normal; no sensory loss   9. Best Language:  0=No aphasia, normal   10. Dysarthria: 0=Normal   11. Extinction and Inattention: 0=No abnormality   12. Distal motor function: 0=Normal    Total:   0     EKG          EKG time: 1320  Rhythm/Rate: NSR, 88  P waves and ND: Normal  QRS, axis: Normal   ST and T waves: Normal     Interpreted Contemporaneously by me, independently viewed  Improved compared to prior 8/26/15      PROGRESS AND CONSULTS     1309 Ordered UA, lipase, PT-INR, CMP, EKG, CBC, CXR, and CT head (due to headache) for further evaluation.    1415 The pt is orthostatic.     1419 Ordered ethanol and UDS for further evaluation.     1519 Rechecked the pt who is resting comfortably. Discussed the pt's negative CT head and labs, and that she is orthostatic. Discussed that the pt is mildly dehydrated. The pt states that she is feeling less weak. Discussed the plan to speak with the pt's endocrinologist. The pt agrees with the plan.     1523 Placed call to Dr. Donahue (endocrinology).     1604 Discussed that Dr. Donahue has not called us back. The pt's  states that he has contact info of his assistant, who will try to have Dr. Donahue call us.     1612 Discussed the pt with Dr. Donahue who states that the pt is stable to be discharged home based on lab work.     1647 Rechecked the pt, and discussed my conversation with her endocrinologist, who states that the pt can be discharged. Discussed the plan to discharge the pt. The pt agrees with the plan and all questions were addressed.     MEDICAL DECISION  MAKING  Results were reviewed/discussed with the patient and they were also made aware of online access. Pt also made aware that some labs, such as cultures, will not be resulted during ER visit and follow up with PMD is necessary.     MDM  Number of Diagnoses or Management Options     Amount and/or Complexity of Data Reviewed  Clinical lab tests: ordered and reviewed (Troponin negative)  Tests in the radiology section of CPT®: ordered and reviewed (CT head: Nothing acute)  Tests in the medicine section of CPT®: ordered and reviewed (See EKG)  Decide to obtain previous medical records or to obtain history from someone other than the patient: yes  Review and summarize past medical records: yes (The pt was admitted on 1/29-1/30. The pt has a h/o Addisons and migraines. The pt was admitted for N/V, and was discharged after being given fluids and solucortef. )  Discuss the patient with other providers: yes (Dr. Donahue (endo))    Patient Progress  Patient progress: improved         DIAGNOSIS  Final diagnoses:   Dehydration   Non-intractable vomiting with nausea, unspecified vomiting type   [place]'s disease (CMS/[facility])       DISPOSITION  Disposition: Discharged.    Patient discharged in stable condition.    Reviewed implications of results, diagnosis, meds, responsibility to follow up, warning signs and symptoms of possible worsening, potential complications and reasons to return to ER, including new or worsening symptoms.    Patient/Family voiced understanding of above instructions.    Discussed plan for discharge, as there is no emergent indication for admission.  Pt/family is agreeable and understands need for follow up and repeat testing.  Pt is aware that discharge does not mean that nothing is wrong but it indicates no emergency is present and they must continue care with follow-up as given below or physician of their choice.     FOLLOW-UP  Lorne Lyle,   [address] [facility]  [address]  [phone]      As needed    Rah Donahue MD  [address] [address]  [phone]    Schedule an appointment as soon as possible for a visit            Medication List      New Prescriptions    ondansetron ODT 4 MG disintegrating tablet  Commonly known as:  ZOFRAN-ODT  Take 1 tablet by mouth Every 6 (Six) Hours As Needed for Nausea.            Latest Documented Vital Signs:  As of 4:52 PM  BP- 115/69 HR- 98 Temp- 99.6 °F (37.6 °C) (Tympanic) O2 sat- 93%    --  Documentation assistance provided by shilpa Hanson for Dr. Noe.  Information recorded by the scribe was done at my direction and has been verified and validated by me.     Beatriz Hanson  02/07/19 6019       Robb Noe MD  02/07/19 7695

## 2019-02-11 ENCOUNTER — RESULTS ENCOUNTER (OUTPATIENT)
Dept: ENDOCRINOLOGY | Age: 59
End: 2019-02-11

## 2019-02-11 DIAGNOSIS — E27.49 MINERALOCORTICOID DEFICIENCY (HCC): ICD-10-CM

## 2019-02-11 DIAGNOSIS — E27.1 ADDISON'S DISEASE (HCC): Primary | ICD-10-CM

## 2019-02-11 DIAGNOSIS — E27.1 ADDISON'S DISEASE (HCC): ICD-10-CM

## 2019-02-11 DIAGNOSIS — E27.40 CHRONIC ADRENAL INSUFFICIENCY (HCC): ICD-10-CM

## 2019-02-11 DIAGNOSIS — E03.9 HYPOTHYROIDISM (ACQUIRED): ICD-10-CM

## 2019-02-13 ENCOUNTER — TELEPHONE (OUTPATIENT)
Dept: ENDOCRINOLOGY | Age: 59
End: 2019-02-13

## 2019-02-13 NOTE — TELEPHONE ENCOUNTER
----- Message from Silvia Tinajero sent at 2/13/2019  1:18 PM EST -----  Contact: patient  Patient wants to speak to dr singh today      Call [phone]     AWARE

## 2019-02-14 RX ORDER — LEVOTHYROXINE SODIUM 100 UG/1
100 TABLET ORAL DAILY
Qty: 30 TABLET | Refills: 5 | Status: SHIPPED | OUTPATIENT
Start: 2019-02-14 | End: 2019-08-16 | Stop reason: SDUPTHER

## 2019-02-25 RX ORDER — OMEPRAZOLE 20 MG/1
CAPSULE, DELAYED RELEASE ORAL
Qty: 30 CAPSULE | Refills: 0 | Status: SHIPPED | OUTPATIENT
Start: 2019-02-25 | End: 2019-03-20 | Stop reason: SDUPTHER

## 2019-02-25 RX ORDER — LIOTHYRONINE SODIUM 5 UG/1
TABLET ORAL
Qty: 60 TABLET | Refills: 0 | Status: SHIPPED | OUTPATIENT
Start: 2019-02-25 | End: 2019-03-24 | Stop reason: SDUPTHER

## 2019-03-04 ENCOUNTER — OFFICE VISIT (OUTPATIENT)
Dept: FAMILY MEDICINE CLINIC | Facility: CLINIC | Age: 59
End: 2019-03-04

## 2019-03-04 VITALS
TEMPERATURE: 98.7 F | HEART RATE: 98 BPM | WEIGHT: 204.5 LBS | RESPIRATION RATE: 16 BRPM | SYSTOLIC BLOOD PRESSURE: 116 MMHG | OXYGEN SATURATION: 99 % | DIASTOLIC BLOOD PRESSURE: 82 MMHG | BODY MASS INDEX: 32.87 KG/M2 | HEIGHT: 66 IN

## 2019-03-04 DIAGNOSIS — F41.8 DEPRESSION WITH ANXIETY: Primary | ICD-10-CM

## 2019-03-04 DIAGNOSIS — E03.9 HYPOTHYROIDISM (ACQUIRED): ICD-10-CM

## 2019-03-04 PROBLEM — R51.9 HEADACHE: Status: RESOLVED | Noted: 2019-01-29 | Resolved: 2019-03-04

## 2019-03-04 PROBLEM — R63.5 ABNORMAL WEIGHT GAIN: Status: RESOLVED | Noted: 2017-08-02 | Resolved: 2019-03-04

## 2019-03-04 PROBLEM — R11.0 NAUSEA: Status: RESOLVED | Noted: 2019-01-29 | Resolved: 2019-03-04

## 2019-03-04 PROBLEM — R11.2 INTRACTABLE VOMITING WITH NAUSEA: Status: RESOLVED | Noted: 2017-08-17 | Resolved: 2019-03-04

## 2019-03-04 PROBLEM — K52.9 GASTROENTERITIS: Status: RESOLVED | Noted: 2017-08-18 | Resolved: 2019-03-04

## 2019-03-04 PROBLEM — R19.7 DIARRHEA: Status: RESOLVED | Noted: 2017-08-17 | Resolved: 2019-03-04

## 2019-03-04 PROBLEM — R76.0 ANTIPHOSPHOLIPID ANTIBODY POSITIVE: Status: RESOLVED | Noted: 2019-01-29 | Resolved: 2019-03-04

## 2019-03-04 PROBLEM — R19.7 DIARRHEA: Status: RESOLVED | Noted: 2019-01-29 | Resolved: 2019-03-04

## 2019-03-04 PROCEDURE — 99214 OFFICE O/P EST MOD 30 MIN: CPT | Performed by: FAMILY MEDICINE

## 2019-03-04 NOTE — PROGRESS NOTES
Subjective   Eden Woods is a 58 y.o. female with   Chief Complaint   Patient presents with   • Med Management     hypothyroidism,    .    History of Present Illness   58-year-old white female with recent diagnosis of depression with anxiety features.  Patient has known history of Nolan's disease.  She continues to see Dr. Pearson  of the endocrine service.  She also sees Dr. Qasim Hurd of the hematology/oncology service.  She has a history of antiphospholipid syndrome.  She has recently been changed from warfarin to Xarelto.  Dr. Hurd who took care of patient's daughter with her breast cancer has diagnosed patient with her depression.  Patient has had prolonged grieving and does admit to decreased energy, decreased motivation and cloudy thinking.  Libido is decreased and patient often does not participate in activities of usual enjoyment.  Patient denies suicide or homicidal ideation.  She has been on Zoloft at 50 mg daily for somewhere in the 10-14-day range.  Thus far she has seen no side effects.   who accompanies her to this appointment does state that he has seen some benefit.  She seems to be thinking more clearly and more willing to do things.  The following portions of the patient's history were reviewed and updated as appropriate: allergies, current medications, past family history, past medical history, past social history, past surgical history and problem list.    Review of Systems   Constitutional: Positive for fatigue.   Psychiatric/Behavioral: Positive for decreased concentration and dysphoric mood. Negative for self-injury, sleep disturbance and suicidal ideas. The patient is nervous/anxious.        Objective     Vitals:    03/04/19 0943   BP: 116/82   Pulse: 98   Resp: 16   Temp: 98.7 °F (37.1 °C)   SpO2: 99%       No results found for this or any previous visit (from the past 168 hour(s)).    Physical Exam   Constitutional: She is oriented to person, place, and time.  She appears well-developed and well-nourished.   HENT:   Head: Normocephalic and atraumatic.   Neck: Neck supple.   Neurological: She is alert and oriented to person, place, and time.   Skin: Skin is warm and dry. No rash noted.   Psychiatric: She has a normal mood and affect. Her speech is normal and behavior is normal. Judgment and thought content normal. Cognition and memory are normal.   Nursing note and vitals reviewed.      Assessment/Plan   Eden was seen today for med management.    Diagnoses and all orders for this visit:    Depression with anxiety    Hypothyroidism (acquired)        Return in about 3 weeks (around 3/25/2019).

## 2019-03-08 ENCOUNTER — PRIOR AUTHORIZATION (OUTPATIENT)
Dept: ENDOCRINOLOGY | Age: 59
End: 2019-03-08

## 2019-03-14 ENCOUNTER — TELEPHONE (OUTPATIENT)
Dept: ENDOCRINOLOGY | Age: 59
End: 2019-03-14

## 2019-03-14 NOTE — TELEPHONE ENCOUNTER
----- Message from Karina Reinoso sent at 3/8/2019 11:15 AM EST -----  PA Denied   Tirosint 100 MCG capsules 30/30 03-  Preferred - Levo-T and Unithroid    CHARTED

## 2019-03-18 ENCOUNTER — TELEPHONE (OUTPATIENT)
Dept: ENDOCRINOLOGY | Age: 59
End: 2019-03-18

## 2019-03-18 NOTE — TELEPHONE ENCOUNTER
----- Message from Isaac Conway Rep sent at 3/12/2019  2:17 PM EDT -----  Contact: patient   Patient seen Dr. Abraham in the hospital in Jan, and spoke with him at some point in Feb and he told her to come in for labs and that she would not need to see him for her normal visit.  Patient was understanding that he would run the normal labs that he usually does on her every 3 to 4 months. If patient is wrong about this information please inform her otherwise she will be here on Monday 3/18/19 for labs. Thank You     CHARTED

## 2019-03-19 LAB
25(OH)D3+25(OH)D2 SERPL-MCNC: 117 NG/ML (ref 30–100)
ACTH PLAS-MCNC: 96 PG/ML (ref 7.2–63.3)
ALBUMIN SERPL-MCNC: 4.3 G/DL (ref 3.5–5.2)
ALBUMIN/GLOB SERPL: 2 G/DL
ALP SERPL-CCNC: 146 U/L (ref 39–117)
ALT SERPL-CCNC: 15 U/L (ref 1–33)
AST SERPL-CCNC: 17 U/L (ref 1–32)
BILIRUB SERPL-MCNC: 0.3 MG/DL (ref 0.2–1.2)
BUN SERPL-MCNC: 15 MG/DL (ref 6–20)
BUN/CREAT SERPL: 14 (ref 7–25)
CALCIUM SERPL-MCNC: 9.1 MG/DL (ref 8.6–10.5)
CHLORIDE SERPL-SCNC: 103 MMOL/L (ref 98–107)
CO2 SERPL-SCNC: 21.5 MMOL/L (ref 22–29)
CORTIS AM PEAK SERPL-MCNC: 27.4 UG/DL (ref 6.2–19.4)
CREAT SERPL-MCNC: 1.07 MG/DL (ref 0.57–1)
GLOBULIN SER CALC-MCNC: 2.1 GM/DL
GLUCOSE SERPL-MCNC: 95 MG/DL (ref 65–99)
POTASSIUM SERPL-SCNC: 4.6 MMOL/L (ref 3.5–5.2)
PROT SERPL-MCNC: 6.4 G/DL (ref 6–8.5)
SODIUM SERPL-SCNC: 140 MMOL/L (ref 136–145)
T4 FREE SERPL-MCNC: 1.31 NG/DL (ref 0.93–1.7)
TSH SERPL DL<=0.005 MIU/L-ACNC: 0.73 MIU/ML (ref 0.27–4.2)

## 2019-03-20 RX ORDER — OMEPRAZOLE 20 MG/1
CAPSULE, DELAYED RELEASE ORAL
Qty: 30 CAPSULE | Refills: 0 | Status: SHIPPED | OUTPATIENT
Start: 2019-03-20 | End: 2019-04-17 | Stop reason: SDUPTHER

## 2019-03-21 ENCOUNTER — TELEPHONE (OUTPATIENT)
Dept: ENDOCRINOLOGY | Age: 59
End: 2019-03-21

## 2019-03-21 NOTE — TELEPHONE ENCOUNTER
----- Message from Silvia Tinajero sent at 3/19/2019 10:25 AM EDT -----  Contact: [facility] labatory [phone]  critical vitamin d  117.0    DR. BRUCE AWARE, CHARTED

## 2019-03-26 ENCOUNTER — OFFICE VISIT (OUTPATIENT)
Dept: FAMILY MEDICINE CLINIC | Facility: CLINIC | Age: 59
End: 2019-03-26

## 2019-03-26 VITALS
OXYGEN SATURATION: 98 % | WEIGHT: 196 LBS | BODY MASS INDEX: 31.5 KG/M2 | SYSTOLIC BLOOD PRESSURE: 118 MMHG | HEART RATE: 100 BPM | HEIGHT: 66 IN | DIASTOLIC BLOOD PRESSURE: 72 MMHG

## 2019-03-26 DIAGNOSIS — F41.8 DEPRESSION WITH ANXIETY: Primary | ICD-10-CM

## 2019-03-26 PROCEDURE — 99213 OFFICE O/P EST LOW 20 MIN: CPT | Performed by: FAMILY MEDICINE

## 2019-03-26 RX ORDER — LIOTHYRONINE SODIUM 5 UG/1
TABLET ORAL
Qty: 60 TABLET | Refills: 0 | Status: SHIPPED | OUTPATIENT
Start: 2019-03-26 | End: 2019-04-23 | Stop reason: SDUPTHER

## 2019-03-26 RX ORDER — FLUDROCORTISONE ACETATE 0.1 MG/1
TABLET ORAL
Qty: 30 TABLET | Refills: 0 | Status: SHIPPED | OUTPATIENT
Start: 2019-03-26 | End: 2019-05-13 | Stop reason: SDUPTHER

## 2019-03-26 NOTE — PROGRESS NOTES
Subjective   Eden Woods is a 58 y.o. female with   Chief Complaint   Patient presents with   • Depression   .    History of Present Illness     59 yo white female who presents for a follow up on depression.  She is currently prescribed Zoloft 50 mg daily and she states it is working well for her.  Her  is with her and also states she is doing better.  In general she reports moods are improved with less anxiety.  He has good energy, motivation and good concentration.  She is sleeping well with normal appetite.  Is participating in activities of usual enjoyment and denies easy agitation or increased irritability.  Patient denies suicidal or homicidal ideation.  She has not noticed any side effects with the medication.  Pt reports she is doing well and is without acute complaints at this time.    The following portions of the patient's history were reviewed and updated as appropriate: allergies, current medications, past family history, past medical history, past social history, past surgical history and problem list.    Review of Systems   Psychiatric/Behavioral: Positive for dysphoric mood. The patient is nervous/anxious.    All other systems reviewed and are negative.      Objective     Vitals:    03/26/19 1436   BP: 118/72   Pulse: 100   SpO2: 98%     BP Readings from Last 3 Encounters:   03/26/19 118/72   03/04/19 116/82   02/07/19 130/73      Wt Readings from Last 3 Encounters:   03/26/19 88.9 kg (196 lb)   03/04/19 92.8 kg (204 lb 8 oz)   02/07/19 90.7 kg (200 lb)        No results found for this or any previous visit (from the past 168 hour(s)).    Physical Exam   Constitutional: She is oriented to person, place, and time. She appears well-developed and well-nourished.   HENT:   Head: Normocephalic and atraumatic.   Neurological: She is alert and oriented to person, place, and time.   Skin: Skin is warm and dry. No rash noted.   Psychiatric: She has a normal mood and affect. Her speech is normal  and behavior is normal. Judgment and thought content normal. Cognition and memory are normal.   Nursing note and vitals reviewed.      Assessment/Plan   Eden was seen today for depression.    Diagnoses and all orders for this visit:    Depression with anxiety  -     Discontinue: sertraline (ZOLOFT) 50 MG tablet; Take 1 tablet by mouth Daily.  -     sertraline (ZOLOFT) 50 MG tablet; Take 1 tablet by mouth Daily.      Return in about 6 months (around 9/26/2019).    Scribed for Lorne Lyle MD by Mari Lehman CMA. 03/26/2019    ILorne MD personally performed the services described in this documentation, as scribed by Mari Lehman CMA in my presence, and it is both accurate and complete

## 2019-04-17 RX ORDER — OMEPRAZOLE 20 MG/1
CAPSULE, DELAYED RELEASE ORAL
Qty: 30 CAPSULE | Refills: 0 | Status: SHIPPED | OUTPATIENT
Start: 2019-04-17 | End: 2019-05-20 | Stop reason: SDUPTHER

## 2019-04-24 RX ORDER — LIOTHYRONINE SODIUM 5 UG/1
TABLET ORAL
Qty: 60 TABLET | Refills: 0 | Status: SHIPPED | OUTPATIENT
Start: 2019-04-24 | End: 2019-05-26 | Stop reason: SDUPTHER

## 2019-04-24 RX ORDER — HYDROCORTISONE 10 MG/1
TABLET ORAL
Qty: 360 TABLET | Refills: 0 | Status: SHIPPED | OUTPATIENT
Start: 2019-04-24 | End: 2019-07-22 | Stop reason: SDUPTHER

## 2019-05-13 RX ORDER — FLUDROCORTISONE ACETATE 0.1 MG/1
TABLET ORAL
Qty: 30 TABLET | Refills: 0 | Status: SHIPPED | OUTPATIENT
Start: 2019-05-13 | End: 2019-06-17 | Stop reason: SDUPTHER

## 2019-05-20 RX ORDER — OMEPRAZOLE 20 MG/1
CAPSULE, DELAYED RELEASE ORAL
Qty: 30 CAPSULE | Refills: 2 | Status: SHIPPED | OUTPATIENT
Start: 2019-05-20 | End: 2019-09-16 | Stop reason: SDUPTHER

## 2019-05-28 RX ORDER — LIOTHYRONINE SODIUM 5 UG/1
TABLET ORAL
Qty: 60 TABLET | Refills: 0 | Status: SHIPPED | OUTPATIENT
Start: 2019-05-28 | End: 2019-06-25 | Stop reason: SDUPTHER

## 2019-06-17 RX ORDER — FLUDROCORTISONE ACETATE 0.1 MG/1
TABLET ORAL
Qty: 30 TABLET | Refills: 0 | Status: SHIPPED | OUTPATIENT
Start: 2019-06-17 | End: 2019-08-15 | Stop reason: SDUPTHER

## 2019-06-25 RX ORDER — LIOTHYRONINE SODIUM 5 UG/1
TABLET ORAL
Qty: 60 TABLET | Refills: 0 | Status: SHIPPED | OUTPATIENT
Start: 2019-06-25 | End: 2019-07-22 | Stop reason: SDUPTHER

## 2019-07-22 RX ORDER — LIOTHYRONINE SODIUM 5 UG/1
TABLET ORAL
Qty: 60 TABLET | Refills: 0 | Status: SHIPPED | OUTPATIENT
Start: 2019-07-22 | End: 2019-08-26 | Stop reason: SDUPTHER

## 2019-07-22 RX ORDER — HYDROCORTISONE 10 MG/1
TABLET ORAL
Qty: 360 TABLET | Refills: 0 | Status: SHIPPED | OUTPATIENT
Start: 2019-07-22 | End: 2019-10-08 | Stop reason: SDUPTHER

## 2019-08-01 RX ORDER — VALACYCLOVIR HYDROCHLORIDE 1 G/1
TABLET, FILM COATED ORAL
Qty: 30 TABLET | Refills: 0 | Status: SHIPPED | OUTPATIENT
Start: 2019-08-01 | End: 2019-11-21 | Stop reason: SDUPTHER

## 2019-08-16 RX ORDER — FLUDROCORTISONE ACETATE 0.1 MG/1
TABLET ORAL
Qty: 30 TABLET | Refills: 0 | Status: SHIPPED | OUTPATIENT
Start: 2019-08-16 | End: 2019-09-16 | Stop reason: SDUPTHER

## 2019-08-16 RX ORDER — LEVOTHYROXINE SODIUM 100 UG/1
TABLET ORAL
Qty: 30 TABLET | Refills: 4 | Status: SHIPPED | OUTPATIENT
Start: 2019-08-16 | End: 2020-01-14

## 2019-08-26 RX ORDER — LIOTHYRONINE SODIUM 5 UG/1
TABLET ORAL
Qty: 60 TABLET | Refills: 0 | Status: SHIPPED | OUTPATIENT
Start: 2019-08-26 | End: 2019-09-23 | Stop reason: SDUPTHER

## 2019-09-16 RX ORDER — FLUDROCORTISONE ACETATE 0.1 MG/1
TABLET ORAL
Qty: 30 TABLET | Refills: 0 | Status: SHIPPED | OUTPATIENT
Start: 2019-09-16 | End: 2019-10-15 | Stop reason: SDUPTHER

## 2019-09-17 RX ORDER — OMEPRAZOLE 20 MG/1
CAPSULE, DELAYED RELEASE ORAL
Qty: 30 CAPSULE | Refills: 1 | Status: SHIPPED | OUTPATIENT
Start: 2019-09-17 | End: 2019-11-12 | Stop reason: SDUPTHER

## 2019-09-24 RX ORDER — LIOTHYRONINE SODIUM 5 UG/1
TABLET ORAL
Qty: 60 TABLET | Refills: 0 | Status: SHIPPED | OUTPATIENT
Start: 2019-09-24 | End: 2019-10-23 | Stop reason: SDUPTHER

## 2019-09-25 ENCOUNTER — OFFICE VISIT (OUTPATIENT)
Dept: FAMILY MEDICINE CLINIC | Facility: CLINIC | Age: 59
End: 2019-09-25

## 2019-09-25 VITALS
DIASTOLIC BLOOD PRESSURE: 72 MMHG | OXYGEN SATURATION: 97 % | BODY MASS INDEX: 33.75 KG/M2 | SYSTOLIC BLOOD PRESSURE: 118 MMHG | WEIGHT: 210 LBS | HEIGHT: 66 IN | HEART RATE: 105 BPM

## 2019-09-25 DIAGNOSIS — F41.8 DEPRESSION WITH ANXIETY: Primary | ICD-10-CM

## 2019-09-25 PROCEDURE — 99213 OFFICE O/P EST LOW 20 MIN: CPT | Performed by: FAMILY MEDICINE

## 2019-09-25 RX ORDER — WARFARIN SODIUM 7.5 MG/1
9 TABLET ORAL
COMMUNITY
Start: 2019-09-12 | End: 2021-12-01 | Stop reason: DRUGHIGH

## 2019-09-25 RX ORDER — CLOPIDOGREL BISULFATE 75 MG/1
TABLET ORAL
COMMUNITY
Start: 2019-09-23 | End: 2022-08-01

## 2019-09-27 NOTE — PROGRESS NOTES
Subjective   Eden Woods is a 59 y.o. female with   Chief Complaint   Patient presents with   • Depression     follow up   .    History of Present Illness   59-year-old white female with history of depression with anxiety features here in follow-up.  Further medical management is performed using sertraline at 50 mg daily.  Feels that her moods are improved and anxiety is much better controlled.  Her  who accompanies her to this appointment states that he feels like he has his old wife back.  She has good energy, motivation and good concentration.  She is sleeping well and has a normal appetite.  She does feel like the reduce stress by no longer working has been beneficial.  She watches her grandchildren during the day and not every day.  She does participate in activities of usual enjoyment and libido is normal.  Patient denies suicide or homicidal ideation.  The following portions of the patient's history were reviewed and updated as appropriate: allergies, current medications, past family history, past medical history, past social history, past surgical history and problem list.    Review of Systems   Psychiatric/Behavioral: Positive for dysphoric mood. The patient is nervous/anxious.        Objective     Vitals:    09/25/19 0911   BP: 118/72   Pulse: 105   SpO2: 97%       Recent Results (from the past 672 hour(s))   PROTIME-INR    Collection Time: 09/12/19  3:03 PM   Result Value Ref Range    Protime 32.1 (H) 10.8 - 13.2 s    INR 2.7 (H) 0.9 - 1.1 INR       Physical Exam   Constitutional: She is oriented to person, place, and time. She appears well-developed and well-nourished.   HENT:   Head: Normocephalic and atraumatic.   Neck: Trachea normal and phonation normal. Neck supple. Normal carotid pulses present. Carotid bruit is not present. No thyroid mass and no thyromegaly present.   Cardiovascular: Normal rate, regular rhythm and normal heart sounds. Exam reveals no gallop and no friction rub.   No  murmur heard.  Pulmonary/Chest: Effort normal and breath sounds normal. No respiratory distress. She has no decreased breath sounds. She has no wheezes. She has no rhonchi. She has no rales.   Lymphadenopathy:     She has no cervical adenopathy.   Neurological: She is alert and oriented to person, place, and time.   Skin: Skin is warm and dry. No rash noted.   Psychiatric: She has a normal mood and affect. Her speech is normal and behavior is normal. Judgment and thought content normal. Cognition and memory are normal.   Nursing note and vitals reviewed.      Assessment/Plan   Eden was seen today for depression.    Diagnoses and all orders for this visit:    Depression with anxiety  -     sertraline (ZOLOFT) 50 MG tablet; Take 1 tablet by mouth Daily.        Return in about 6 months (around 3/25/2020) for Recheck.

## 2019-10-08 RX ORDER — HYDROCORTISONE 10 MG/1
TABLET ORAL
Qty: 360 TABLET | Refills: 0 | Status: SHIPPED | OUTPATIENT
Start: 2019-10-08 | End: 2020-01-14

## 2019-10-15 RX ORDER — FLUDROCORTISONE ACETATE 0.1 MG/1
TABLET ORAL
Qty: 30 TABLET | Refills: 0 | Status: SHIPPED | OUTPATIENT
Start: 2019-10-15 | End: 2019-11-12 | Stop reason: SDUPTHER

## 2019-10-23 RX ORDER — LIOTHYRONINE SODIUM 5 UG/1
TABLET ORAL
Qty: 60 TABLET | Refills: 0 | Status: SHIPPED | OUTPATIENT
Start: 2019-10-23 | End: 2019-11-21 | Stop reason: SDUPTHER

## 2019-11-12 RX ORDER — OMEPRAZOLE 20 MG/1
CAPSULE, DELAYED RELEASE ORAL
Qty: 30 CAPSULE | Refills: 5 | Status: SHIPPED | OUTPATIENT
Start: 2019-11-12 | End: 2020-10-06 | Stop reason: SDUPTHER

## 2019-11-14 RX ORDER — FLUDROCORTISONE ACETATE 0.1 MG/1
TABLET ORAL
Qty: 30 TABLET | Refills: 0 | Status: SHIPPED | OUTPATIENT
Start: 2019-11-14 | End: 2019-12-13 | Stop reason: SDUPTHER

## 2019-11-21 RX ORDER — VALACYCLOVIR HYDROCHLORIDE 1 G/1
TABLET, FILM COATED ORAL
Qty: 30 TABLET | Refills: 0 | Status: SHIPPED | OUTPATIENT
Start: 2019-11-21 | End: 2020-08-06

## 2019-11-22 RX ORDER — LIOTHYRONINE SODIUM 5 UG/1
TABLET ORAL
Qty: 60 TABLET | Refills: 0 | Status: SHIPPED | OUTPATIENT
Start: 2019-11-22 | End: 2019-12-18

## 2019-12-13 RX ORDER — FLUDROCORTISONE ACETATE 0.1 MG/1
TABLET ORAL
Qty: 30 TABLET | Refills: 0 | Status: SHIPPED | OUTPATIENT
Start: 2019-12-13 | End: 2020-01-14

## 2019-12-18 RX ORDER — LIOTHYRONINE SODIUM 5 UG/1
TABLET ORAL
Qty: 60 TABLET | Refills: 0 | Status: SHIPPED | OUTPATIENT
Start: 2019-12-18 | End: 2020-01-14

## 2020-01-14 RX ORDER — FLUDROCORTISONE ACETATE 0.1 MG/1
TABLET ORAL
Qty: 30 TABLET | Refills: 0 | Status: SHIPPED | OUTPATIENT
Start: 2020-01-14

## 2020-01-14 RX ORDER — LEVOTHYROXINE SODIUM 100 UG/1
TABLET ORAL
Qty: 30 TABLET | Refills: 3 | Status: SHIPPED | OUTPATIENT
Start: 2020-01-14 | End: 2020-10-27 | Stop reason: DRUGHIGH

## 2020-01-14 RX ORDER — LIOTHYRONINE SODIUM 5 UG/1
TABLET ORAL
Qty: 60 TABLET | Refills: 0 | Status: SHIPPED | OUTPATIENT
Start: 2020-01-14 | End: 2020-10-27 | Stop reason: DRUGHIGH

## 2020-01-14 RX ORDER — HYDROCORTISONE 10 MG/1
TABLET ORAL
Qty: 360 TABLET | Refills: 0 | Status: SHIPPED | OUTPATIENT
Start: 2020-01-14 | End: 2022-11-02 | Stop reason: DRUGHIGH

## 2020-04-29 ENCOUNTER — TELEMEDICINE (OUTPATIENT)
Dept: FAMILY MEDICINE CLINIC | Facility: CLINIC | Age: 60
End: 2020-04-29

## 2020-04-29 DIAGNOSIS — E03.9 HYPOTHYROIDISM (ACQUIRED): ICD-10-CM

## 2020-04-29 DIAGNOSIS — K21.00 GASTROESOPHAGEAL REFLUX DISEASE WITH ESOPHAGITIS: ICD-10-CM

## 2020-04-29 DIAGNOSIS — R11.0 NAUSEA: ICD-10-CM

## 2020-04-29 DIAGNOSIS — F41.8 DEPRESSION WITH ANXIETY: Primary | ICD-10-CM

## 2020-04-29 DIAGNOSIS — E27.1 ADDISON'S DISEASE (HCC): ICD-10-CM

## 2020-04-29 DIAGNOSIS — D68.61 ANTIPHOSPHOLIPID SYNDROME (HCC): ICD-10-CM

## 2020-04-29 PROCEDURE — 99213 OFFICE O/P EST LOW 20 MIN: CPT | Performed by: FAMILY MEDICINE

## 2020-04-29 RX ORDER — ONDANSETRON 4 MG/1
4 TABLET, ORALLY DISINTEGRATING ORAL EVERY 8 HOURS PRN
Qty: 20 TABLET | Refills: 0 | Status: SHIPPED | OUTPATIENT
Start: 2020-04-29 | End: 2020-09-09

## 2020-04-29 NOTE — PROGRESS NOTES
Tomas Woods is a 59 y.o. female.     History of Present Illness   59-year-old white female with consented video visit here for further medical management of depression with anxiety features.  Patient has been using Zoloft at 50 mg daily and has been using this product regularly.  She denies side effects including no evidence of sexual dysfunction or weight gain.  She has a history of Dinwiddie's disease and is followed by Dr. callaway of the endocrinology service.  She is using fludrocortisone at 0.1 mg every morning as well as hydrocortisone at 15 mg every morning and 10 mg at noon with 5 mg at evening time.  She is also hypothyroid using levothyroxine at 88 mcg daily.  She also uses Cytomel at 5 mcg twice daily.  She continues to use omeprazole at 20 mg daily and finds that if she does not that she has GI issues especially with the above given steroids.  She continues with both Plavix and warfarin.  Her oncologist/hematologist follows her INRs.  She is currently semiretired and for the most part watches grandchildren.  She does get occasional nausea and is requesting a refill of Zofran ODT.  Patient denies suicidal or homicidal ideation.  In general depression and anxiety are well controlled and she will need to continue the above-mentioned medication.  The following portions of the patient's history were reviewed and updated as appropriate: allergies, current medications, past family history, past medical history, past social history, past surgical history and problem list.    Review of Systems   Gastrointestinal: Positive for nausea and GERD.   Endocrine: Negative for cold intolerance and heat intolerance.        Dinwiddie's disease, hypothyroidism   Psychiatric/Behavioral: Positive for dysphoric mood. Negative for self-injury, sleep disturbance, suicidal ideas and stress. The patient is nervous/anxious.        Objective   Physical Exam   Constitutional: She is oriented to person, place, and time. She  appears well-developed and well-nourished.   HENT:   Head: Normocephalic and atraumatic.   Neurological: She is alert and oriented to person, place, and time.   Psychiatric: She has a normal mood and affect. Her speech is normal and behavior is normal. Judgment and thought content normal. Cognition and memory are normal.   Further exam is not performed given this type of visit.      Assessment/Plan   Eden was seen today for depression, anxiety and hypothyroidism.    Diagnoses and all orders for this visit:    Depression with anxiety  -     sertraline (ZOLOFT) 50 MG tablet; Take 1 tablet by mouth Daily.    Hypothyroidism (acquired)    Ethan's disease (CMS/HCC)    Antiphospholipid syndrome (CMS/HCC)    Gastroesophageal reflux disease with esophagitis    Nausea  -     ondansetron ODT (Zofran ODT) 4 MG disintegrating tablet; Place 1 tablet on the tongue Every 8 (Eight) Hours As Needed for Nausea or Vomiting.    Video visit required 15 minutes to completion.

## 2020-08-03 NOTE — TELEPHONE ENCOUNTER
Last ov:07/10/18  Last fill:09/06/18  Next ov:11/05/18     Pt called to let us know that she didn't have MRI of the brain before, but has decided she would like to proceed now..  She would like you to reorder prior to coming back.   Do you mind to reorder?

## 2020-08-06 RX ORDER — VALACYCLOVIR HYDROCHLORIDE 1 G/1
TABLET, FILM COATED ORAL
Qty: 30 TABLET | Refills: 0 | Status: SHIPPED | OUTPATIENT
Start: 2020-08-06 | End: 2020-12-02

## 2020-09-08 DIAGNOSIS — R11.0 NAUSEA: ICD-10-CM

## 2020-09-09 RX ORDER — ONDANSETRON 4 MG/1
TABLET, ORALLY DISINTEGRATING ORAL
Qty: 20 TABLET | Refills: 0 | Status: SHIPPED | OUTPATIENT
Start: 2020-09-09 | End: 2022-03-14

## 2020-10-06 RX ORDER — OMEPRAZOLE 20 MG/1
20 CAPSULE, DELAYED RELEASE ORAL
Qty: 30 CAPSULE | Refills: 0 | Status: SHIPPED | OUTPATIENT
Start: 2020-10-06 | End: 2020-10-27 | Stop reason: SDUPTHER

## 2020-10-06 NOTE — TELEPHONE ENCOUNTER
Caller: Eden Woods    Relationship: Self    Best call back number: [phone]     Medication needed:   Requested Prescriptions     Pending Prescriptions Disp Refills   • omeprazole (priLOSEC) 20 MG capsule 30 capsule 5     Sig: Take 1 capsule by mouth every night at bedtime.       When do you need the refill by:     What details did the patient provide when requesting the medication:   Patient usually gets a 30 day supply but would like to change her prescription to a 90 day supply.     Does the patient have less than a 3 day supply:  [] Yes  [x] No    What is the patient's preferred pharmacy:    [facility] [place] [address] [place] [facility] - [phone] [facility] [phone]   [phone]

## 2020-10-21 DIAGNOSIS — F41.8 DEPRESSION WITH ANXIETY: ICD-10-CM

## 2020-10-27 ENCOUNTER — OFFICE VISIT (OUTPATIENT)
Dept: FAMILY MEDICINE CLINIC | Facility: CLINIC | Age: 60
End: 2020-10-27

## 2020-10-27 VITALS
SYSTOLIC BLOOD PRESSURE: 122 MMHG | OXYGEN SATURATION: 97 % | WEIGHT: 225 LBS | TEMPERATURE: 96.9 F | DIASTOLIC BLOOD PRESSURE: 78 MMHG | BODY MASS INDEX: 37.49 KG/M2 | HEIGHT: 65 IN | HEART RATE: 94 BPM

## 2020-10-27 DIAGNOSIS — K21.00 GASTROESOPHAGEAL REFLUX DISEASE WITH ESOPHAGITIS WITHOUT HEMORRHAGE: ICD-10-CM

## 2020-10-27 DIAGNOSIS — F41.8 DEPRESSION WITH ANXIETY: Primary | ICD-10-CM

## 2020-10-27 PROCEDURE — 99213 OFFICE O/P EST LOW 20 MIN: CPT | Performed by: FAMILY MEDICINE

## 2020-10-27 RX ORDER — LIOTHYRONINE SODIUM 5 UG/1
5 TABLET ORAL DAILY
COMMUNITY

## 2020-10-27 RX ORDER — SERTRALINE HYDROCHLORIDE 25 MG/1
25 TABLET, FILM COATED ORAL DAILY
Qty: 30 TABLET | Refills: 0 | Status: SHIPPED | OUTPATIENT
Start: 2020-10-27 | End: 2020-12-28 | Stop reason: DRUGHIGH

## 2020-10-27 RX ORDER — LEVOTHYROXINE SODIUM 88 UG/1
TABLET ORAL
COMMUNITY
Start: 2020-08-31 | End: 2021-12-01 | Stop reason: DRUGHIGH

## 2020-10-27 RX ORDER — OMEPRAZOLE 20 MG/1
20 CAPSULE, DELAYED RELEASE ORAL
Qty: 90 CAPSULE | Refills: 1 | Status: SHIPPED | OUTPATIENT
Start: 2020-10-27 | End: 2021-04-26

## 2020-10-31 NOTE — PROGRESS NOTES
Subjective   Eden Woods is a 60 y.o. female with No chief complaint on file.  .    History of Present Illness   60-year-old white female with multiple issues here for further medical management.  Patient with depression with anxiety feature having used successfully Zoloft at 50 mg daily.  She lost her middle child-her youngest daughter approximately 5 years ago and feels that the Zoloft had been beneficial but has been somewhat run its course.  She is doing well and would like to wean from this product.  She is no longer working full-time but watching grandkids most days.  In general she has good energy, motivation and good concentration.  She does sleep well and has a normal appetite.  Patient denies suicidal or homicidal ideation.  She also has a history of GERD using omeprazole and will need refill of same.  This product continues to be successful in controlling her symptoms.  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 Avila of the endocrinology service.  She also continues to see Dr. Hurd of the heme-onc department for antiphospholipid syndrome.  The following portions of the patient's history were reviewed and updated as appropriate: allergies, current medications, past family history, past medical history, past social history, past surgical history and problem list.    Review of Systems   Endocrine:        Coral's disease, hypothyroidism   Hematological:        Antiphospholipid syndrome   Psychiatric/Behavioral: Positive for dysphoric mood. The patient is nervous/anxious.        Objective     Vitals:    10/27/20 1030   BP: 122/78   Pulse: 94   Temp: 96.9 °F (36.1 °C)   SpO2: 97%       Recent Results (from the past 672 hour(s))   PROTIME-INR    Collection Time: 10/22/20 12:29 PM    Specimen: Fresh Frozen Plasma    Specimen type and source: Plasma, Blood   Result Value Ref Range    Protime 26.6 (H) 10.8 - 13.2 s    INR 2.2 (H) 0.9 - 1.1 INR       Physical Exam  Vitals signs and nursing note  reviewed.   Constitutional:       Appearance: Normal appearance. She is well-developed and well-groomed. She is obese.   HENT:      Head: Normocephalic and atraumatic.   Neck:      Musculoskeletal: Neck supple.      Thyroid: No thyroid mass or thyromegaly.      Vascular: Normal carotid pulses. No carotid bruit.      Trachea: Trachea and phonation normal.   Cardiovascular:      Rate and Rhythm: Normal rate and regular rhythm.      Heart sounds: Normal heart sounds. No murmur. No friction rub. No gallop.    Pulmonary:      Effort: Pulmonary effort is normal. No respiratory distress.      Breath sounds: Normal breath sounds. No decreased breath sounds, wheezing, rhonchi or rales.   Lymphadenopathy:      Cervical: No cervical adenopathy.   Skin:     General: Skin is warm and dry.      Findings: No rash.   Neurological:      Mental Status: She is alert and oriented to person, place, and time.   Psychiatric:         Attention and Perception: Attention and perception normal.         Mood and Affect: Mood and affect normal.         Speech: Speech normal.         Behavior: Behavior normal. Behavior is cooperative.         Thought Content: Thought content normal.         Cognition and Memory: Cognition and memory normal.         Judgment: Judgment normal.         Assessment/Plan   Diagnoses and all orders for this visit:    1. Depression with anxiety (Primary)  -     sertraline (Zoloft) 25 MG tablet; Take 1 tablet by mouth Daily.  Dispense: 30 tablet; Refill: 0    2. Gastroesophageal reflux disease with esophagitis without hemorrhage  -     omeprazole (priLOSEC) 20 MG capsule; Take 1 capsule by mouth every night at bedtime.  Dispense: 90 capsule; Refill: 1    Sertraline will be decreased to 25 mg daily for 1 month at which time she will discontinue this product altogether.  Omeprazole will be refilled and continued without alteration.    Return if symptoms worsen or fail to improve.

## 2020-12-02 RX ORDER — VALACYCLOVIR HYDROCHLORIDE 1 G/1
TABLET, FILM COATED ORAL
Qty: 30 TABLET | Refills: 0 | Status: SHIPPED | OUTPATIENT
Start: 2020-12-02 | End: 2021-02-08

## 2020-12-27 DIAGNOSIS — F41.8 DEPRESSION WITH ANXIETY: ICD-10-CM

## 2020-12-28 NOTE — TELEPHONE ENCOUNTER
PTS DOSE WAS DECREASED TO 25MG IN OCT 2020 BUT WASN'T DOING WELL WITH THAT AND HAD GONE BACK TO 50MG.  SHE HAD ENOUGH AT HOME TILL NOW.  OK TO FILL AT THE 50MG?

## 2021-01-26 ENCOUNTER — OFFICE VISIT (OUTPATIENT)
Dept: FAMILY MEDICINE CLINIC | Facility: CLINIC | Age: 61
End: 2021-01-26

## 2021-01-26 VITALS
DIASTOLIC BLOOD PRESSURE: 80 MMHG | OXYGEN SATURATION: 96 % | SYSTOLIC BLOOD PRESSURE: 136 MMHG | TEMPERATURE: 97.1 F | WEIGHT: 227.9 LBS | HEART RATE: 102 BPM | BODY MASS INDEX: 37.97 KG/M2 | HEIGHT: 65 IN

## 2021-01-26 DIAGNOSIS — F41.8 DEPRESSION WITH ANXIETY: Primary | ICD-10-CM

## 2021-01-26 DIAGNOSIS — K21.00 GASTROESOPHAGEAL REFLUX DISEASE WITH ESOPHAGITIS WITHOUT HEMORRHAGE: ICD-10-CM

## 2021-01-26 PROCEDURE — 99213 OFFICE O/P EST LOW 20 MIN: CPT | Performed by: FAMILY MEDICINE

## 2021-01-26 NOTE — PROGRESS NOTES
Subjective   Eden Woods is a 60 y.o. female with   Chief Complaint   Patient presents with   • Follow-up     MED CHECK    .    History of Present Illness   60-year-old white female with history of depression with anxiety features here for further medical management.  Patient has been using sertraline at 50 mg daily for quite some time.  She has tried to wean herself from this product but felt symptoms return and therefore returned to the her usual dose as mentioned above.  While using sertraline her anxiety is much improved with good energy, motivation and good concentration.  She is sleeping well and has a normal appetite.  Patient denies suicidal or homicidal ideation.  She also has history of GERD and uses omeprazole at 20 mg daily.  She has experienced no side effects with both of the above medication and they are both used appropriately.  The following portions of the patient's history were reviewed and updated as appropriate: allergies, current medications, past family history, past medical history, past social history, past surgical history and problem list.    Review of Systems   Gastrointestinal:        GERD   Psychiatric/Behavioral: Positive for dysphoric mood. The patient is nervous/anxious.        Objective     Vitals:    01/26/21 0955   BP: 136/80   Pulse: 102   Temp: 97.1 °F (36.2 °C)   SpO2: 96%       Recent Results (from the past 672 hour(s))   PROTIME-INR    Collection Time: 01/21/21 12:27 PM    Specimen: Fresh Frozen Plasma    Specimen type and source: Plasma, Blood   Result Value Ref Range    Protime 51.0 (H) 10.8 - 13.2 s    INR 4.2 (H) 0.9 - 1.1 INR       Physical Exam  Vitals signs and nursing note reviewed.   Constitutional:       Appearance: Normal appearance. She is well-developed and well-groomed. She is obese.      Comments: Exogenous obesity with a BMI of 37.9   HENT:      Head: Normocephalic and atraumatic.   Neck:      Musculoskeletal: Neck supple.      Thyroid: No thyroid mass or  thyromegaly.      Vascular: Normal carotid pulses. No carotid bruit.      Trachea: Trachea and phonation normal.   Cardiovascular:      Rate and Rhythm: Normal rate and regular rhythm.      Heart sounds: Normal heart sounds. No murmur. No friction rub. No gallop.    Pulmonary:      Effort: Pulmonary effort is normal. No respiratory distress.      Breath sounds: Normal breath sounds. No decreased breath sounds, wheezing, rhonchi or rales.   Lymphadenopathy:      Cervical: No cervical adenopathy.   Skin:     General: Skin is warm and dry.      Findings: No rash.   Neurological:      Mental Status: She is alert and oriented to person, place, and time.   Psychiatric:         Attention and Perception: Attention and perception normal.         Mood and Affect: Mood and affect normal.         Speech: Speech normal.         Behavior: Behavior normal. Behavior is cooperative.         Thought Content: Thought content normal.         Cognition and Memory: Cognition and memory normal.         Judgment: Judgment normal.         Assessment/Plan   Diagnoses and all orders for this visit:    1. Depression with anxiety (Primary)  -     sertraline (ZOLOFT) 50 MG tablet; Take 1 tablet by mouth Daily.  Dispense: 90 tablet; Refill: 1    2. Gastroesophageal reflux disease with esophagitis without hemorrhage        Return in about 6 months (around 7/26/2021) for Recheck.

## 2021-02-08 RX ORDER — VALACYCLOVIR HYDROCHLORIDE 1 G/1
TABLET, FILM COATED ORAL
Qty: 30 TABLET | Refills: 0 | Status: SHIPPED | OUTPATIENT
Start: 2021-02-08 | End: 2021-04-09

## 2021-04-09 RX ORDER — VALACYCLOVIR HYDROCHLORIDE 1 G/1
TABLET, FILM COATED ORAL
Qty: 30 TABLET | Refills: 0 | Status: SHIPPED | OUTPATIENT
Start: 2021-04-09 | End: 2021-12-09

## 2021-04-26 DIAGNOSIS — K21.00 GASTROESOPHAGEAL REFLUX DISEASE WITH ESOPHAGITIS WITHOUT HEMORRHAGE: ICD-10-CM

## 2021-04-26 RX ORDER — OMEPRAZOLE 20 MG/1
CAPSULE, DELAYED RELEASE ORAL
Qty: 90 CAPSULE | Refills: 0 | Status: SHIPPED | OUTPATIENT
Start: 2021-04-26 | End: 2021-07-27

## 2021-07-26 DIAGNOSIS — K21.00 GASTROESOPHAGEAL REFLUX DISEASE WITH ESOPHAGITIS WITHOUT HEMORRHAGE: ICD-10-CM

## 2021-07-26 DIAGNOSIS — F41.8 DEPRESSION WITH ANXIETY: ICD-10-CM

## 2021-07-27 ENCOUNTER — OFFICE VISIT (OUTPATIENT)
Dept: FAMILY MEDICINE CLINIC | Facility: CLINIC | Age: 61
End: 2021-07-27

## 2021-07-27 VITALS
TEMPERATURE: 98 F | WEIGHT: 235 LBS | DIASTOLIC BLOOD PRESSURE: 80 MMHG | BODY MASS INDEX: 39.15 KG/M2 | SYSTOLIC BLOOD PRESSURE: 122 MMHG | HEART RATE: 110 BPM | HEIGHT: 65 IN | OXYGEN SATURATION: 98 %

## 2021-07-27 DIAGNOSIS — F41.9 ANXIETY: ICD-10-CM

## 2021-07-27 DIAGNOSIS — K21.00 GASTROESOPHAGEAL REFLUX DISEASE WITH ESOPHAGITIS WITHOUT HEMORRHAGE: ICD-10-CM

## 2021-07-27 DIAGNOSIS — F41.8 DEPRESSION WITH ANXIETY: Primary | ICD-10-CM

## 2021-07-27 PROBLEM — M85.80 OSTEOPENIA: Status: ACTIVE | Noted: 2021-05-27

## 2021-07-27 PROBLEM — E27.49: Status: ACTIVE | Noted: 2021-05-27

## 2021-07-27 PROCEDURE — 99213 OFFICE O/P EST LOW 20 MIN: CPT | Performed by: FAMILY MEDICINE

## 2021-07-27 RX ORDER — OMEPRAZOLE 20 MG/1
CAPSULE, DELAYED RELEASE ORAL
Qty: 90 CAPSULE | Refills: 1 | Status: SHIPPED | OUTPATIENT
Start: 2021-07-27 | End: 2022-01-26

## 2021-07-27 NOTE — PROGRESS NOTES
Subjective   Eden Woods is a 60 y.o. female with   Chief Complaint   Patient presents with   • Depression   • Anxiety   .    History of Present Illness   60-year-old white female with history of depression with anxiety features here for further medical management.  Patient with Ethan's disease followed by Dr. Marino  of the endocrinology service.  She is also followed by Dr. Qasim Hurd of the oncology service for antiphospholipid syndrome.  She continues to use sertraline at 50 mg daily for her depression with anxiety.  She has tried most recently to wean from this product and found this to be not successful.  As she dropped down to 25 mg daily she began to have increase anxiety as well as tension.  She became increasingly agitated and irritable.  By returning to 50 mg the symptoms resolved and she feels like she is back to herself.  There have been no side effects with this medication.  In general she has good energy, motivation and good concentration.  She is sleeping well and has a normal diet.  She does participate in activities of usual enjoyment and libido is normal denies suicidal or homicidal ideation.  The following portions of the patient's history were reviewed and updated as appropriate: allergies, current medications, past family history, past medical history, past social history, past surgical history and problem list.    Review of Systems   Psychiatric/Behavioral: Positive for dysphoric mood. The patient is nervous/anxious.        Objective     Vitals:    07/27/21 1003   BP: 122/80   Pulse: 110   Temp: 98 °F (36.7 °C)   SpO2: 98%       Recent Results (from the past 672 hour(s))   PROTIME-INR    Collection Time: 07/22/21  2:58 PM    Specimen: Fresh Frozen Plasma    Specimen type and source: Plasma, Blood   Result Value Ref Range    Protime 60.2 (H) 10.8 - 13.2 s    INR 5.0 (C) 0.9 - 1.1 INR       Physical Exam  Vitals and nursing note reviewed.   Constitutional:       Appearance: Normal  appearance. She is well-developed and well-groomed.   HENT:      Head: Normocephalic and atraumatic.   Musculoskeletal:      Cervical back: Neck supple.   Skin:     General: Skin is warm and dry.      Findings: No rash.   Neurological:      Mental Status: She is alert and oriented to person, place, and time.   Psychiatric:         Attention and Perception: Attention and perception normal.         Mood and Affect: Mood and affect normal.         Speech: Speech normal.         Behavior: Behavior normal. Behavior is cooperative.         Thought Content: Thought content normal.         Cognition and Memory: Cognition and memory normal.         Judgment: Judgment normal.         Assessment/Plan   Diagnoses and all orders for this visit:    1. Depression with anxiety (Primary)  -     sertraline (ZOLOFT) 50 MG tablet; Take 1 tablet by mouth Daily.  Dispense: 90 tablet; Refill: 1    2. Anxiety    3. Gastroesophageal reflux disease with esophagitis without hemorrhage        Return in about 6 months (around 1/27/2022) for Recheck.

## 2021-11-29 ENCOUNTER — TELEPHONE (OUTPATIENT)
Dept: FAMILY MEDICINE CLINIC | Facility: CLINIC | Age: 61
End: 2021-11-29

## 2021-12-01 ENCOUNTER — OFFICE VISIT (OUTPATIENT)
Dept: FAMILY MEDICINE CLINIC | Facility: CLINIC | Age: 61
End: 2021-12-01

## 2021-12-01 VITALS
WEIGHT: 238 LBS | HEIGHT: 65 IN | OXYGEN SATURATION: 98 % | HEART RATE: 66 BPM | DIASTOLIC BLOOD PRESSURE: 86 MMHG | TEMPERATURE: 97.7 F | BODY MASS INDEX: 39.65 KG/M2 | SYSTOLIC BLOOD PRESSURE: 148 MMHG

## 2021-12-01 DIAGNOSIS — G40.909 SEIZURE DISORDER (HCC): Primary | ICD-10-CM

## 2021-12-01 DIAGNOSIS — J01.00 ACUTE NON-RECURRENT MAXILLARY SINUSITIS: ICD-10-CM

## 2021-12-01 DIAGNOSIS — E27.1 ADDISON'S DISEASE (HCC): ICD-10-CM

## 2021-12-01 PROBLEM — R56.9 SEIZURE-LIKE ACTIVITY: Status: ACTIVE | Noted: 2021-11-23

## 2021-12-01 PROBLEM — R73.9 HYPERGLYCEMIA: Status: ACTIVE | Noted: 2021-11-18

## 2021-12-01 PROBLEM — E55.9 VITAMIN D DEFICIENCY: Status: ACTIVE | Noted: 2021-11-18

## 2021-12-01 PROCEDURE — 99214 OFFICE O/P EST MOD 30 MIN: CPT | Performed by: FAMILY MEDICINE

## 2021-12-01 RX ORDER — WARFARIN SODIUM 3 MG/1
3 TABLET ORAL DAILY
COMMUNITY
Start: 2021-08-04 | End: 2022-08-04

## 2021-12-01 RX ORDER — WARFARIN SODIUM 5 MG/1
TABLET ORAL
COMMUNITY
Start: 2021-10-17

## 2021-12-01 RX ORDER — LEVOTHYROXINE SODIUM 100 UG/1
TABLET ORAL
COMMUNITY
Start: 2021-11-22 | End: 2022-06-23 | Stop reason: SDUPTHER

## 2021-12-01 RX ORDER — LEVETIRACETAM 500 MG/1
500 TABLET ORAL
COMMUNITY
Start: 2021-11-24 | End: 2022-01-26 | Stop reason: ALTCHOICE

## 2021-12-01 RX ORDER — HYDROCORTISONE 5 MG/1
TABLET ORAL
COMMUNITY
Start: 2021-10-18 | End: 2022-08-01

## 2021-12-01 RX ORDER — AMOXICILLIN AND CLAVULANATE POTASSIUM 875; 125 MG/1; MG/1
1 TABLET, FILM COATED ORAL 2 TIMES DAILY
Qty: 20 TABLET | Refills: 0 | Status: SHIPPED | OUTPATIENT
Start: 2021-12-01 | End: 2022-01-26

## 2021-12-01 RX ORDER — HYDROCORTISONE SOD SUCCINATE 100 MG
VIAL (EA) INJECTION
COMMUNITY
Start: 2021-11-19 | End: 2022-08-01

## 2021-12-02 PROBLEM — G40.909 SEIZURE DISORDER: Status: ACTIVE | Noted: 2021-12-02

## 2021-12-02 NOTE — PROGRESS NOTES
Subjective   Eden Woods is a 61 y.o. female with   Chief Complaint   Patient presents with   • Hospital Follow Up Visit     C/O was Seizure.  Pts balance is off   • Sinusitis     facial pain and pressure, teeth hurt, eyes hurt. Nasal congestion   .    History of Present Illness   61-year-old white female with multiple medical problems here with recent hospitalization for new onset seizure.  Patient had been apparently taking copious amounts of supplemental magnesium thinking this was an appropriate thing to do but failed to understand that she had significant chronic kidney disease and that the magnesium was not being cleared.  It was felt that this led to a seizure where she actually was witnessed to have a tonic-clonic event biting her tongue and losing bladder control.  She was transported by EMS to [facility] and a thorough evaluation was obtained.  MRI of the brain failed to show any new lesion and a spot EEG did show some slowing in some areas consistent with epileptiform pattern.  She has an appointment to see a [place] neurologist next week for further evaluation.  She has Faulkner's disease and is followed both by Dr. Marino of endocrinology and Qasim Hurd of the hematology oncology service.  She was placed on Keppra at 500 mg twice daily but finds the morning dose to be too sedating and has cut this pill in half.  She is now using 250 mg every morning and 500 mg every afternoon.  She has declined to use any higher dose until she sees the neurologist next week.  Her tongue is healing.  She does complain of significant facial pain with congestion postnasal drip and teeth discomfort.  She has had these symptoms before and it often correlates with acute sinus infection.  There is a mild nonproductive cough.  Patient denies fever or loss of taste or smell.  Symptoms have been present over the last 5 to 7 days.  The following portions of the patient's history were reviewed and updated as  appropriate: allergies, current medications, past family history, past medical history, past social history, past surgical history and problem list.    Review of Systems   Constitutional: Positive for fatigue. Negative for fever.   HENT: Positive for congestion, mouth sores, postnasal drip, sinus pain and sore throat.    Respiratory: Positive for cough. Negative for shortness of breath and wheezing.    Cardiovascular: Negative for chest pain.   Neurological: Positive for seizures.       Objective     Vitals:    12/01/21 1349   BP: 148/86   Pulse: 66   Temp: 97.7 °F (36.5 °C)   SpO2: 98%       Recent Results (from the past 672 hour(s))   PROTIME-INR    Collection Time: 11/23/21  4:39 AM    Specimen: Fresh Frozen Plasma    Specimen type and source: Plasma, Blood   Result Value Ref Range    Protime 21.6 (H) 10.3 - 13.3 s    INR 1.8 INR   Osmolality, Urine -    Collection Time: 11/23/21  5:15 PM    Specimen: Urine    Specimen type and source: Urine, Urine   Result Value Ref Range    Osmolality, URINE 91.5 (L) 300 - 1,100 mosm/kg   Sodium, Urine, Random -    Collection Time: 11/23/21  5:15 PM    Specimen: Urine    Specimen type and source: Urine, Urine   Result Value Ref Range    Sodium, urine <20 mmol/L   PROTIME-INR    Collection Time: 11/23/21  8:59 PM    Specimen: Fresh Frozen Plasma    Specimen type and source: Plasma, Blood   Result Value Ref Range    Protime 25.8 (H) 10.3 - 13.3 s    INR 2.2 INR   CBC AND DIFFERENTIAL    Collection Time: 11/24/21  8:15 AM    Specimen type and source: Whole Blood, Blood   Result Value Ref Range    WBC 5.55 4.5 - 11.0 10*3/uL    RBC 4.10 4.0 - 5.2 10*6/uL    Hemoglobin 8.8 (L) 12.0 - 16.0 g/dL    Hematocrit 29.3 (L) 36.0 - 46.0 %    MCV 71.5 (L) 80.0 - 100.0 fL    MCH 21.5 (L) 26.0 - 34.0 pg    MCHC 30.0 (L) 31.0 - 37.0 g/dL    RDW 16.9 (H) 12.0 - 16.8 %    Platelets 210 140 - 440 10*3/uL    MPV 10.3 8.4 - 12.4 fL    Differential Type Hospital CBC w/AutoDiff (arb'U)    Neutrophil  Rel % 65.3 45 - 80 %    Lymphocyte Rel % 24.3 15 - 50 %    Monocyte Rel % 4.7 0 - 15 %    Eosinophil % 4.5 0 - 7 %    Basophil Rel % 0.7 0 - 2 %    Immature Grans % 0.5 0.0 - 1.0 %    nRBC 0 0 /100(WBC)    Neutrophils Absolute 3.62 2.0 - 8.8 10*3/uL    Lymphocytes Absolute 1.35 0.7 - 5.5 10*3/uL    Monocytes Absolute 0.26 0.0 - 1.7 10*3/uL    Eosinophils Absolute 0.25 0.0 - 0.8 10*3/uL    Basophils Absolute 0.04 0.0 - 0.2 10*3/uL    Immature Grans, Absolute 0.03 0.00 - 0.10 10*3/uL   PROTIME-INR    Collection Time: 11/24/21  8:15 AM    Specimen: Fresh Frozen Plasma    Specimen type and source: Plasma, Blood   Result Value Ref Range    Protime 26.8 (H) 10.3 - 13.3 s    INR 2.3 INR       Physical Exam  Vitals and nursing note reviewed.   Constitutional:       Appearance: Normal appearance. She is well-developed and well-groomed.   HENT:      Head: Normocephalic and atraumatic.      Right Ear: Hearing, tympanic membrane, ear canal and external ear normal.      Left Ear: Hearing, tympanic membrane, ear canal and external ear normal.      Nose: Nose normal.      Mouth/Throat:      Lips: Pink.      Mouth: Mucous membranes are moist.      Pharynx: Oropharynx is clear. Uvula midline.   Neck:      Thyroid: No thyroid mass or thyromegaly.      Vascular: Normal carotid pulses. No carotid bruit.      Trachea: Trachea and phonation normal.   Cardiovascular:      Rate and Rhythm: Normal rate and regular rhythm.      Heart sounds: Normal heart sounds. No murmur heard.  No friction rub. No gallop.    Pulmonary:      Effort: Pulmonary effort is normal. No respiratory distress.      Breath sounds: Normal breath sounds. No decreased breath sounds, wheezing, rhonchi or rales.   Musculoskeletal:      Cervical back: Neck supple.   Lymphadenopathy:      Cervical: No cervical adenopathy.   Skin:     General: Skin is warm and dry.      Findings: No rash.   Neurological:      Mental Status: She is alert and oriented to person, place, and  time.   Psychiatric:         Attention and Perception: Attention and perception normal.         Mood and Affect: Mood and affect normal.         Speech: Speech normal.         Behavior: Behavior normal. Behavior is cooperative.         Thought Content: Thought content normal.         Cognition and Memory: Cognition and memory normal.         Judgment: Judgment normal.         Assessment/Plan   Diagnoses and all orders for this visit:    1. Seizure disorder (HCC) (Primary)    2. Acute non-recurrent maxillary sinusitis    3. Lyman's disease (HCC)    Other orders  -     amoxicillin-clavulanate (Augmentin) 875-125 MG per tablet; Take 1 tablet by mouth 2 (Two) Times a Day.  Dispense: 20 tablet; Refill: 0        Return if symptoms worsen or fail to improve.

## 2021-12-09 RX ORDER — VALACYCLOVIR HYDROCHLORIDE 1 G/1
TABLET, FILM COATED ORAL
Qty: 30 TABLET | Refills: 0 | Status: SHIPPED | OUTPATIENT
Start: 2021-12-09 | End: 2022-03-24

## 2022-01-25 ENCOUNTER — TELEMEDICINE (OUTPATIENT)
Dept: FAMILY MEDICINE CLINIC | Facility: CLINIC | Age: 62
End: 2022-01-25

## 2022-01-25 DIAGNOSIS — E55.9 VITAMIN D DEFICIENCY: ICD-10-CM

## 2022-01-25 DIAGNOSIS — D68.61 ANTIPHOSPHOLIPID SYNDROME: ICD-10-CM

## 2022-01-25 DIAGNOSIS — I82.5Z9 CHRONIC DEEP VEIN THROMBOSIS (DVT) OF DISTAL VEIN OF LOWER EXTREMITY, UNSPECIFIED LATERALITY: ICD-10-CM

## 2022-01-25 DIAGNOSIS — E27.1 ADDISON'S DISEASE: ICD-10-CM

## 2022-01-25 DIAGNOSIS — I51.81 TAKOTSUBO CARDIOMYOPATHY: Primary | ICD-10-CM

## 2022-01-25 DIAGNOSIS — K21.00 GASTROESOPHAGEAL REFLUX DISEASE WITH ESOPHAGITIS WITHOUT HEMORRHAGE: ICD-10-CM

## 2022-01-25 DIAGNOSIS — G40.909 SEIZURE DISORDER: ICD-10-CM

## 2022-01-25 DIAGNOSIS — F41.8 DEPRESSION WITH ANXIETY: ICD-10-CM

## 2022-01-25 DIAGNOSIS — E03.9 HYPOTHYROIDISM (ACQUIRED): ICD-10-CM

## 2022-01-25 PROCEDURE — 99214 OFFICE O/P EST MOD 30 MIN: CPT | Performed by: FAMILY MEDICINE

## 2022-01-26 PROBLEM — R56.9 SEIZURE-LIKE ACTIVITY: Status: RESOLVED | Noted: 2021-11-23 | Resolved: 2022-01-26

## 2022-01-26 RX ORDER — METOPROLOL SUCCINATE 50 MG/1
50 TABLET, EXTENDED RELEASE ORAL DAILY
Qty: 30 TABLET | Refills: 0
Start: 2022-01-26 | End: 2022-03-04 | Stop reason: SDUPTHER

## 2022-01-26 RX ORDER — LEVETIRACETAM 500 MG/1
TABLET, EXTENDED RELEASE ORAL
Qty: 60 TABLET | Refills: 0
Start: 2022-01-26 | End: 2022-08-01

## 2022-01-26 RX ORDER — OMEPRAZOLE 20 MG/1
CAPSULE, DELAYED RELEASE ORAL
Qty: 90 CAPSULE | Refills: 1 | Status: SHIPPED | OUTPATIENT
Start: 2022-01-26 | End: 2022-08-01

## 2022-01-26 NOTE — PROGRESS NOTES
Subjective   Eden Woods is a 61 y.o. female with No chief complaint on file.  .    History of Present Illness   61-year-old white female with recent hospitalization in regards to a tonic-clonic seizure.  Patient was identified by her  as seizing and EMS was summoned taking her to [facility].  Full evaluation was performed.  Patient was discharged on Keppra with anticipated follow-up with neurology.  She subsequently developed chest pain and was admitted again to the same hospital.  Troponin levels were elevated and Dr. Abad of the cardiology service attended.  Cardiac cath was performed and coronary arteries were completely open.  She was diagnosed with Takotsubo cardiomyopathy.  She has had increased stress over the last several years with the loss of her daughter to cancer and several health issues herself.  She spent several days on a ventilator following sepsis from a misdiagnosed gallbladder situation.  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  She is followed by Qasim Hurd MD of the hematology/oncology service.  She will follow-up in the near future with both cardiology and neurology.  She will need refills of some of her medication.  This is been a consented video visit.  The following portions of the patient's history were reviewed and updated as appropriate: allergies, current medications, past family history, past medical history, past social history, past surgical history and problem list.    Review of Systems   Cardiovascular: Positive for chest pain. Negative for palpitations and leg swelling.        Takotsubo cardiomyopathy   Endocrine:        Rosalia's disease   Genitourinary:        Chronic kidney disease   Neurological: Positive for seizures.       Objective     There were no vitals filed for this visit.    Recent Results (from the past 672 hour(s))   PROTIME-INR    Collection Time: 01/06/22 12:32 PM     Specimen: Fresh Frozen Plasma    Specimen type and source: Plasma, Blood   Result Value Ref Range    Protime 48.1 (H) 10.8 - 13.2 s    INR 4.0 (H) 0.9 - 1.1 INR   MAGNESIUM    Collection Time: 01/20/22  1:23 PM    Specimen: Fresh Frozen Plasma    Specimen type and source: Plasma, Blood   Result Value Ref Range    Magnesium 1.6 1.6 - 2.6 mg/dL   APTT    Collection Time: 01/20/22  1:23 PM    Specimen: Fresh Frozen Plasma    Specimen type and source: Plasma, Blood   Result Value Ref Range    PTT 57.3 (H) 25.1 - 36.5 s   TROPONIN (IN-HOUSE)    Collection Time: 01/20/22  1:23 PM    Specimen: Fresh Frozen Plasma    Specimen type and source: Plasma, Blood   Result Value Ref Range    Troponin I 0.404 (C) 0.000 - 0.034 ng/mL   CBC AND DIFFERENTIAL    Collection Time: 01/20/22  1:23 PM    Specimen type and source: Whole Blood, Blood   Result Value Ref Range    WBC 7.77 4.5 - 11.0 10*3/uL    RBC 3.83 (L) 4.0 - 5.2 10*6/uL    Hemoglobin 8.2 (L) 12.0 - 16.0 g/dL    Hematocrit 27.5 (L) 36.0 - 46.0 %    MCV 71.8 (L) 80.0 - 100.0 fL    MCH 21.4 (L) 26.0 - 34.0 pg    MCHC 29.8 (L) 31.0 - 37.0 g/dL    RDW 18.3 (H) 12.0 - 16.8 %    Platelets 291 140 - 440 10*3/uL    MPV 10.7 8.4 - 12.4 fL    Differential Type Hospital CBC w/AutoDiff (arb'U)    Neutrophil Rel % 71.8 45 - 80 %    Lymphocyte Rel % 16.7 15 - 50 %    Monocyte Rel % 4.6 0 - 15 %    Eosinophil % 5.3 0 - 7 %    Basophil Rel % 0.6 0 - 2 %    Immature Grans % 1.0 0.0 - 1.0 %    nRBC 0 0 /100(WBC)    Neutrophils Absolute 5.57 2.0 - 8.8 10*3/uL    Lymphocytes Absolute 1.30 0.7 - 5.5 10*3/uL    Monocytes Absolute 0.36 0.0 - 1.7 10*3/uL    Eosinophils Absolute 0.41 0.0 - 0.8 10*3/uL    Basophils Absolute 0.05 0.0 - 0.2 10*3/uL    Immature Grans, Absolute 0.08 0.00 - 0.10 10*3/uL   PROTIME-INR    Collection Time: 01/20/22  1:23 PM    Specimen: Fresh Frozen Plasma    Specimen type and source: Plasma, Blood   Result Value Ref Range    Protime 39.6 (H) 10.3 - 13.3 s    INR 3.3 INR    TROPONIN (IN-HOUSE)    Collection Time: 01/20/22  3:18 PM    Specimen: Fresh Frozen Plasma    Specimen type and source: Plasma, Blood   Result Value Ref Range    Troponin I 0.513 (C) 0.000 - 0.034 ng/mL   TROPONIN (IN-HOUSE)    Collection Time: 01/20/22  7:36 PM    Specimen: Fresh Frozen Plasma    Specimen type and source: Plasma, Blood   Result Value Ref Range    Troponin I 0.793 (C) 0.000 - 0.034 ng/mL   TROPONIN (IN-HOUSE)    Collection Time: 01/21/22  4:03 AM    Specimen: Fresh Frozen Plasma    Specimen type and source: Plasma, Blood   Result Value Ref Range    Troponin I 1.148 (C) 0.000 - 0.034 ng/mL   PROTIME-INR    Collection Time: 01/21/22  4:03 AM    Specimen: Fresh Frozen Plasma    Specimen type and source: Plasma, Blood   Result Value Ref Range    Protime 35.3 (H) 10.3 - 13.3 s    INR 3.0 INR   CBC AND DIFFERENTIAL    Collection Time: 01/21/22  4:03 AM    Specimen type and source: Whole Blood, Blood   Result Value Ref Range    WBC 8.22 4.5 - 11.0 10*3/uL    RBC 3.76 (L) 4.0 - 5.2 10*6/uL    Hemoglobin 7.9 (L) 12.0 - 16.0 g/dL    Hematocrit 27.5 (L) 36.0 - 46.0 %    MCV 73.1 (L) 80.0 - 100.0 fL    MCH 21.0 (L) 26.0 - 34.0 pg    MCHC 28.7 (L) 31.0 - 37.0 g/dL    RDW 18.4 (H) 12.0 - 16.8 %    Platelets 263 140 - 440 10*3/uL    MPV 10.8 8.4 - 12.4 fL    Differential Type Hospital CBC w/AutoDiff (arb'U)    Neutrophil Rel % 66.6 45 - 80 %    Lymphocyte Rel % 20.4 15 - 50 %    Monocyte Rel % 6.0 0 - 15 %    Eosinophil % 5.7 0 - 7 %    Basophil Rel % 0.4 0 - 2 %    Immature Grans % 0.9 0.0 - 1.0 %    nRBC 0 0 /100(WBC)    Neutrophils Absolute 5.48 2.0 - 8.8 10*3/uL    Lymphocytes Absolute 1.68 0.7 - 5.5 10*3/uL    Monocytes Absolute 0.49 0.0 - 1.7 10*3/uL    Eosinophils Absolute 0.47 0.0 - 0.8 10*3/uL    Basophils Absolute 0.03 0.0 - 0.2 10*3/uL    Immature Grans, Absolute 0.07 0.00 - 0.10 10*3/uL   TROPONIN (IN-HOUSE)    Collection Time: 01/21/22 12:00 PM    Specimen: Fresh Frozen Plasma    Specimen type and  source: Plasma, Blood   Result Value Ref Range    Troponin I 1.126 (C) 0.000 - 0.034 ng/mL   FERRITIN    Collection Time: 01/22/22  6:12 AM    Specimen: Fresh Frozen Plasma    Specimen type and source: Plasma, Blood   Result Value Ref Range    Ferritin 56.3 5 - 204 ng/mL   CBC AND DIFFERENTIAL    Collection Time: 01/22/22  6:12 AM    Specimen type and source: Whole Blood, Blood   Result Value Ref Range    WBC 8.22 4.5 - 11.0 10*3/uL    RBC 3.84 (L) 4.0 - 5.2 10*6/uL    Hemoglobin 8.1 (L) 12.0 - 16.0 g/dL    Hematocrit 28.2 (L) 36.0 - 46.0 %    MCV 73.4 (L) 80.0 - 100.0 fL    MCH 21.1 (L) 26.0 - 34.0 pg    MCHC 28.7 (L) 31.0 - 37.0 g/dL    RDW 18.1 (H) 12.0 - 16.8 %    Platelets 252 140 - 440 10*3/uL    MPV 10.5 8.4 - 12.4 fL    Differential Type Hospital CBC w/AutoDiff (arb'U)    Neutrophil Rel % 73.4 45 - 80 %    Lymphocyte Rel % 16.9 15 - 50 %    Monocyte Rel % 4.9 0 - 15 %    Eosinophil % 3.5 0 - 7 %    Basophil Rel % 0.4 0 - 2 %    Immature Grans % 0.9 0.0 - 1.0 %    nRBC 0 0 /100(WBC)    Neutrophils Absolute 6.04 2.0 - 8.8 10*3/uL    Lymphocytes Absolute 1.39 0.7 - 5.5 10*3/uL    Monocytes Absolute 0.40 0.0 - 1.7 10*3/uL    Eosinophils Absolute 0.29 0.0 - 0.8 10*3/uL    Basophils Absolute 0.03 0.0 - 0.2 10*3/uL    Immature Grans, Absolute 0.07 0.00 - 0.10 10*3/uL   PROTIME-INR    Collection Time: 01/22/22  6:12 AM    Specimen: Fresh Frozen Plasma    Specimen type and source: Plasma, Blood   Result Value Ref Range    Protime 41.7 (H) 10.3 - 13.3 s    INR 3.5 INR   TROPONIN (IN-HOUSE)    Collection Time: 01/22/22  6:12 AM    Specimen: Fresh Frozen Plasma    Specimen type and source: Plasma, Blood   Result Value Ref Range    Troponin I 0.924 (C) 0.000 - 0.034 ng/mL       Physical Exam  Vitals and nursing note reviewed.   Constitutional:       Appearance: Normal appearance. She is well-developed and well-groomed.   HENT:      Head: Normocephalic and atraumatic.   Musculoskeletal:      Cervical back: Neck supple.    Skin:     General: Skin is warm and dry.      Findings: No rash.   Neurological:      Mental Status: She is alert and oriented to person, place, and time.   Psychiatric:         Attention and Perception: Attention and perception normal.         Mood and Affect: Mood and affect normal.         Speech: Speech normal.         Behavior: Behavior normal. Behavior is cooperative.         Thought Content: Thought content normal.         Cognition and Memory: Cognition and memory normal.         Judgment: Judgment normal.         Assessment/Plan   Diagnoses and all orders for this visit:    1. Takotsubo cardiomyopathy (Primary)  -     metoprolol succinate XL (Toprol XL) 50 MG 24 hr tablet; Take 1 tablet by mouth Daily.  Dispense: 30 tablet; Refill: 0    2. Seizure disorder (HCC)  -     levETIRAcetam XR (Keppra XR) 500 MG 24 hr tablet; 2 po q hs  Dispense: 60 tablet; Refill: 0    3. Boston's disease (HCC)    4. Hypothyroidism (acquired)    5. Vitamin D deficiency    6. Antiphospholipid syndrome (HCC)    7. Chronic deep vein thrombosis (DVT) of distal vein of lower extremity, unspecified laterality (HCC)    Sertraline and omeprazole have been refilled.  Follow-up with neurology and cardiology as mentioned above.  Follow-up in this office in 6 months or on an as-needed basis.  Consented video visit required 25 minutes for completion.    Return in about 6 months (around 7/25/2022), or if symptoms worsen or fail to improve, for Next scheduled follow up, Recheck.

## 2022-03-04 DIAGNOSIS — I51.81 TAKOTSUBO CARDIOMYOPATHY: ICD-10-CM

## 2022-03-04 RX ORDER — METOPROLOL SUCCINATE 50 MG/1
50 TABLET, EXTENDED RELEASE ORAL DAILY
Qty: 90 TABLET | Refills: 1
Start: 2022-03-04 | End: 2022-03-07 | Stop reason: SDUPTHER

## 2022-03-04 NOTE — TELEPHONE ENCOUNTER
Caller: Eden Woods    Relationship: Self    Best call back number: [phone]  Requested Prescriptions:   Requested Prescriptions     Pending Prescriptions Disp Refills   • metoprolol succinate XL (Toprol XL) 50 MG 24 hr tablet 30 tablet 0     Sig: Take 1 tablet by mouth Daily.        Pharmacy where request should be sent: [facility] [address] [facility] AT [facility] [phone] [facility] [phone] FX     Additional details provided by patient: WOULD LIKE 90 A DAY, AND PATIENT IS OUT OF MEDICATION,  PLEASE SEND TO PHARMACY TODAY IF POSSIBLE     Does the patient have less than a 3 day supply:  [x] Yes  [] No    Isaac Perez Rep   03/04/22 08:37 EST

## 2022-03-04 NOTE — TELEPHONE ENCOUNTER
PATIENT IS CALLING IN TO CHECK ON THE STATUS OF THIS MEDICATION AS SHE IS NOW OUT.  PATIENT IS REQUESTING 90 DAY SUPPLY    CALL BACK  [phone]  PHARMACY  [place] [phone]

## 2022-03-07 RX ORDER — METOPROLOL SUCCINATE 50 MG/1
50 TABLET, EXTENDED RELEASE ORAL DAILY
Qty: 90 TABLET | Refills: 1 | Status: SHIPPED | OUTPATIENT
Start: 2022-03-07 | End: 2022-11-02 | Stop reason: SDUPTHER

## 2022-03-12 DIAGNOSIS — R11.0 NAUSEA: ICD-10-CM

## 2022-03-14 RX ORDER — ONDANSETRON 4 MG/1
TABLET, ORALLY DISINTEGRATING ORAL
Qty: 20 TABLET | Refills: 0 | Status: SHIPPED | OUTPATIENT
Start: 2022-03-14 | End: 2022-09-27

## 2022-03-24 RX ORDER — VALACYCLOVIR HYDROCHLORIDE 1 G/1
TABLET, FILM COATED ORAL
Qty: 30 TABLET | Refills: 0 | Status: SHIPPED | OUTPATIENT
Start: 2022-03-24 | End: 2022-08-12

## 2022-04-27 ENCOUNTER — TELEPHONE (OUTPATIENT)
Dept: FAMILY MEDICINE CLINIC | Facility: CLINIC | Age: 62
End: 2022-04-27

## 2022-04-27 NOTE — TELEPHONE ENCOUNTER
Caller: FARIDA    Relationship to patient:     Best call back number: [phone]    Patient is needing: WANTED TO NOTIFY DR. THEY WERE RECENTLY DISCHARGED AND ARE AT HIGH RISK TO BE READMITTED.  ALEX CORETS [phone].

## 2022-05-05 ENCOUNTER — TELEPHONE (OUTPATIENT)
Dept: FAMILY MEDICINE CLINIC | Facility: CLINIC | Age: 62
End: 2022-05-05

## 2022-05-05 RX ORDER — FOLIC ACID 1 MG/1
1 TABLET ORAL DAILY
Qty: 30 TABLET | Refills: 0 | Status: SHIPPED | OUTPATIENT
Start: 2022-05-05

## 2022-05-05 RX ORDER — DOXYCYCLINE HYCLATE 50 MG/1
324 CAPSULE, GELATIN COATED ORAL
Qty: 30 TABLET | Refills: 0 | Status: SHIPPED | OUTPATIENT
Start: 2022-05-05

## 2022-05-05 NOTE — TELEPHONE ENCOUNTER
Su is calling to request prescriptions for Folic Acid 1mg and Ferrous Gluconate 324mg.  These are not on her med list.  Will you prescribe them for her?

## 2022-05-16 ENCOUNTER — TELEPHONE (OUTPATIENT)
Dept: FAMILY MEDICINE CLINIC | Facility: CLINIC | Age: 62
End: 2022-05-16

## 2022-05-16 DIAGNOSIS — E27.1 ADDISON'S DISEASE: Primary | ICD-10-CM

## 2022-05-16 RX ORDER — HYDROCORTISONE 20 MG/1
20 TABLET ORAL DAILY
Qty: 30 TABLET | Refills: 0 | Status: SHIPPED | OUTPATIENT
Start: 2022-05-16 | End: 2022-11-02 | Stop reason: SDUPTHER

## 2022-05-16 NOTE — TELEPHONE ENCOUNTER
Caller: Eden Woods    Relationship to patient: Self    Best call back number: [phone]    Patient is needing: PATIENT IS CALLING IN TO REQUEST AN URGENT REFERRAL TO ENDOCRINOLOGY. PATIENT STATES SHE WAS DIAGNOSED WITH LUPUS 3 WEEKS AGO AND SHE IS CURRENTLY IN A FLAIR UP. SHE HAS 4 APPOINTMENTS THIS WEEK WITH MULTIPLE DOCTORS. SHE REPORTS IMPROVEMENT SINCE STARTING MEDICATIONS FOR HER LUPUS. PATIENT IS ALSO ASKING FOR A REFILL OF hydrocortisone 20MG. PATIENT REPORTS SHE WAS GIVEN THIS IN THE HOSPITAL. PATIENT STATES THIS MEDICATION REFILL WILL JUST TIDE HER OVER UNTIL SHE CAN GET INTO AN ENDOCRINOLOGY.     [facility] [address] AT [facility] - [phone]  - [phone]   [phone]    PATIENT IS REQUESTING A REFERRAL TO [facility] IF POSSIBLY. AFTAB GARVEY [phone]. PATIENT STATES SHE SPOKE WITH CARDIOLOGY AND WAS TOLD TO LET HER PCP KNOW TO PUT THIS REFERRAL IN AS URGENT. PATIENT IS REQUESTING THIS TO BE URGENT. PATIENT ALSO STATES THAT IF DR. MORALES NEEDS TO CALL AND SPEAK WITH PATIENT TO CALL HER ON CALL BACK NUMBER.

## 2022-06-07 ENCOUNTER — TELEPHONE (OUTPATIENT)
Dept: FAMILY MEDICINE CLINIC | Facility: CLINIC | Age: 62
End: 2022-06-07

## 2022-06-07 RX ORDER — PANTOPRAZOLE SODIUM 40 MG/1
40 TABLET, DELAYED RELEASE ORAL 2 TIMES DAILY
COMMUNITY
Start: 2022-04-17 | End: 2022-06-07 | Stop reason: SDUPTHER

## 2022-06-07 RX ORDER — PANTOPRAZOLE SODIUM 40 MG/1
40 TABLET, DELAYED RELEASE ORAL 2 TIMES DAILY
Qty: 60 TABLET | Refills: 1 | Status: SHIPPED | OUTPATIENT
Start: 2022-06-07 | End: 2022-08-16

## 2022-06-07 NOTE — TELEPHONE ENCOUNTER
Caller: Eden Woods    Relationship: Self    Best call back number:[phone]     What medication are you requesting: PROTONIX 40MG 1 TABLET TWICE DAILY     PHARMACY:[facility] [address] [place] [address] [address] [facility] - [phone]  - [phone]   [phone]    Additional notes:PATIENT WAS PRESCRIBED THIS MEDICATION IN THE HOSPITAL SHE HAS ABOUT 2 DAYS LEFT AND NEEDING A REFILL

## 2022-06-23 RX ORDER — LEVOTHYROXINE SODIUM 100 UG/1
100 TABLET ORAL DAILY
Qty: 90 TABLET | Refills: 0 | Status: SHIPPED | OUTPATIENT
Start: 2022-06-23 | End: 2022-11-02 | Stop reason: SDUPTHER

## 2022-06-23 NOTE — TELEPHONE ENCOUNTER
Caller: Eden Woods    Relationship: Self    Best call back number: [phone]    Requested Prescriptions:   Requested Prescriptions     Pending Prescriptions Disp Refills   • Levoxyl 100 MCG tablet          Pharmacy where request should be sent: [facility] [address] AT [facility] - [phone]  - [phone]     Additional details provided by patient: PATIENT IS IN BETWEEN ENDOCRINOLOGIST AND WOULD LIKE TO KNOW IF DR MORALES CAN SEND IN A 90 DAY SUPPLY. PLEASE ADVISE.     Does the patient have less than a 3 day supply:  [] Yes  [x] No    Isaac Ortega Rep   06/23/22 10:16 EDT

## 2022-06-28 ENCOUNTER — TELEPHONE (OUTPATIENT)
Dept: FAMILY MEDICINE CLINIC | Facility: CLINIC | Age: 62
End: 2022-06-28

## 2022-06-28 NOTE — TELEPHONE ENCOUNTER
Caller: Eden Woods    Relationship: Self    Best call back number: [phone]      What is the best time to reach you: ANYTIME    Who are you requesting to speak with (clinical staff, provider,  specific staff member): MA        What was the call regarding: PATIENT IS CALLING IN SHE WOULD LIKE CALLBACK SHE RECEIVED COUPLE OF NOTIFICATIONS ON HER MYCHART THAT SHE WOULD LIKE TO SPEAK TO SOMEONE ABOUT. SOMETHING ABOUT A REFERRAL FOR A [facility] DOCTOR AND SOME APPT THAT SHE IS SUPPOSED TO HAVE AT 2PM TOMORROW AND SHE HAS NO IDEA WHERE THAT IS.    Do you require a callback: YES

## 2022-06-29 NOTE — TELEPHONE ENCOUNTER
Spoke with patient regarding appt with endo which is in December.  Advised her that was the soonest they could schedule her and Dr Lyle had confirmed that was ok.  Also let her know I was not able to see who she may have had an appointment with today as that was a [employer] message she had gotten.

## 2022-07-11 RX ORDER — VALACYCLOVIR HYDROCHLORIDE 1 G/1
TABLET, FILM COATED ORAL
Qty: 30 TABLET | Refills: 0 | OUTPATIENT
Start: 2022-07-11

## 2022-08-01 ENCOUNTER — OFFICE VISIT (OUTPATIENT)
Dept: FAMILY MEDICINE CLINIC | Facility: CLINIC | Age: 62
End: 2022-08-01

## 2022-08-01 VITALS
TEMPERATURE: 97.1 F | OXYGEN SATURATION: 98 % | HEIGHT: 65 IN | DIASTOLIC BLOOD PRESSURE: 72 MMHG | SYSTOLIC BLOOD PRESSURE: 124 MMHG | BODY MASS INDEX: 40.15 KG/M2 | WEIGHT: 241 LBS | HEART RATE: 75 BPM

## 2022-08-01 DIAGNOSIS — E03.9 HYPOTHYROIDISM (ACQUIRED): ICD-10-CM

## 2022-08-01 DIAGNOSIS — E66.01 MORBID EXOGENOUS OBESITY: ICD-10-CM

## 2022-08-01 DIAGNOSIS — F41.8 DEPRESSION WITH ANXIETY: ICD-10-CM

## 2022-08-01 DIAGNOSIS — E55.9 VITAMIN D DEFICIENCY: ICD-10-CM

## 2022-08-01 DIAGNOSIS — N18.32 STAGE 3B CHRONIC KIDNEY DISEASE: ICD-10-CM

## 2022-08-01 DIAGNOSIS — Z79.01 LONG TERM CURRENT USE OF ANTICOAGULANT THERAPY: ICD-10-CM

## 2022-08-01 DIAGNOSIS — M32.12 SYSTEMIC LUPUS ERYTHEMATOSUS (SLE) WITH PERICARDITIS, UNSPECIFIED SLE TYPE: Primary | ICD-10-CM

## 2022-08-01 DIAGNOSIS — D68.61 ANTIPHOSPHOLIPID SYNDROME: ICD-10-CM

## 2022-08-01 DIAGNOSIS — E27.1 ADDISON'S DISEASE: ICD-10-CM

## 2022-08-01 DIAGNOSIS — G40.909 SEIZURE DISORDER: ICD-10-CM

## 2022-08-01 DIAGNOSIS — I82.5Z9 CHRONIC DEEP VEIN THROMBOSIS (DVT) OF DISTAL VEIN OF LOWER EXTREMITY, UNSPECIFIED LATERALITY: ICD-10-CM

## 2022-08-01 DIAGNOSIS — G43.009 MIGRAINE WITHOUT AURA AND WITHOUT STATUS MIGRAINOSUS, NOT INTRACTABLE: ICD-10-CM

## 2022-08-01 PROBLEM — N18.30 CKD (CHRONIC KIDNEY DISEASE) STAGE 3, GFR 30-59 ML/MIN (HCC): Status: ACTIVE | Noted: 2022-04-12

## 2022-08-01 PROBLEM — D50.9 ANEMIA, IRON DEFICIENCY: Status: ACTIVE | Noted: 2022-04-29

## 2022-08-01 PROBLEM — D12.3 ADENOMATOUS POLYP OF TRANSVERSE COLON: Status: ACTIVE | Noted: 2022-08-01

## 2022-08-01 PROBLEM — R77.8 ELEVATED TROPONIN: Status: ACTIVE | Noted: 2021-12-04

## 2022-08-01 PROBLEM — I31.9 MYOPERICARDITIS: Status: ACTIVE | Noted: 2022-04-25

## 2022-08-01 PROBLEM — K90.9 INTESTINAL MALABSORPTION: Status: ACTIVE | Noted: 2022-04-29

## 2022-08-01 PROBLEM — R79.89 ELEVATED TROPONIN: Status: ACTIVE | Noted: 2021-12-04

## 2022-08-01 PROBLEM — I30.9 ACUTE PERICARDITIS: Status: ACTIVE | Noted: 2022-04-25

## 2022-08-01 PROCEDURE — 99214 OFFICE O/P EST MOD 30 MIN: CPT | Performed by: FAMILY MEDICINE

## 2022-08-01 RX ORDER — LAMOTRIGINE 100 MG/1
100 TABLET ORAL
COMMUNITY
Start: 2022-06-29 | End: 2023-06-24

## 2022-08-01 RX ORDER — HYDROCORTISONE 10 MG/1
3 TABLET ORAL DAILY
COMMUNITY
Start: 2022-05-16 | End: 2022-11-02 | Stop reason: DRUGHIGH

## 2022-08-01 RX ORDER — COLCHICINE 0.6 MG/1
0.6 TABLET ORAL DAILY
COMMUNITY
End: 2022-11-02 | Stop reason: DRUGHIGH

## 2022-08-01 RX ORDER — POTASSIUM CHLORIDE 20 MEQ/1
TABLET, EXTENDED RELEASE ORAL
COMMUNITY
Start: 2022-05-24

## 2022-08-01 RX ORDER — WARFARIN SODIUM 4 MG/1
TABLET ORAL
COMMUNITY
Start: 2022-07-10

## 2022-08-01 RX ORDER — LIDOCAINE 50 MG/G
1 PATCH TOPICAL DAILY
COMMUNITY
Start: 2022-04-18

## 2022-08-01 RX ORDER — MAGNESIUM OXIDE 400 MG/1
400 TABLET ORAL DAILY
COMMUNITY
Start: 2022-05-27

## 2022-08-01 RX ORDER — HYDROXYCHLOROQUINE SULFATE 200 MG/1
400 TABLET, FILM COATED ORAL DAILY
COMMUNITY
Start: 2022-04-26 | End: 2022-11-02 | Stop reason: SDUPTHER

## 2022-08-06 PROBLEM — E66.01 MORBID EXOGENOUS OBESITY (HCC): Status: ACTIVE | Noted: 2022-08-06

## 2022-08-06 PROBLEM — G43.009 MIGRAINE WITHOUT AURA AND WITHOUT STATUS MIGRAINOSUS, NOT INTRACTABLE: Status: ACTIVE | Noted: 2022-08-06

## 2022-08-06 PROBLEM — M32.12 SYSTEMIC LUPUS ERYTHEMATOSUS (SLE) WITH PERICARDITIS: Status: ACTIVE | Noted: 2022-08-06

## 2022-08-12 RX ORDER — VALACYCLOVIR HYDROCHLORIDE 1 G/1
TABLET, FILM COATED ORAL
Qty: 30 TABLET | Refills: 0 | Status: SHIPPED | OUTPATIENT
Start: 2022-08-12 | End: 2022-09-27

## 2022-08-15 DIAGNOSIS — F41.8 DEPRESSION WITH ANXIETY: ICD-10-CM

## 2022-08-16 RX ORDER — PANTOPRAZOLE SODIUM 40 MG/1
TABLET, DELAYED RELEASE ORAL
Qty: 60 TABLET | Refills: 2 | Status: SHIPPED | OUTPATIENT
Start: 2022-08-16 | End: 2022-11-02 | Stop reason: SDUPTHER

## 2022-08-19 ENCOUNTER — TELEPHONE (OUTPATIENT)
Dept: FAMILY MEDICINE CLINIC | Facility: CLINIC | Age: 62
End: 2022-08-19

## 2022-08-19 NOTE — TELEPHONE ENCOUNTER
BALDEMAR WITH [facility] [employer] CALLED IN REGARDS TO MEDICATION UBRELVY.    NEEDS PRE AUTH    [facility]  [phone]  REF KEY [id number]    PHARMACY HAS FAXED FOR PRE AUTH 2 TIMES    [facility] [place] [place] [facility] [address] [facility] - [phone]  - [phone]   [phone]

## 2022-09-08 DIAGNOSIS — G43.009 MIGRAINE WITHOUT AURA AND WITHOUT STATUS MIGRAINOSUS, NOT INTRACTABLE: ICD-10-CM

## 2022-09-27 DIAGNOSIS — R11.0 NAUSEA: ICD-10-CM

## 2022-09-27 RX ORDER — ONDANSETRON 4 MG/1
TABLET, ORALLY DISINTEGRATING ORAL
Qty: 20 TABLET | Refills: 0 | Status: SHIPPED | OUTPATIENT
Start: 2022-09-27 | End: 2023-02-10

## 2022-09-27 RX ORDER — VALACYCLOVIR HYDROCHLORIDE 1 G/1
TABLET, FILM COATED ORAL
Qty: 30 TABLET | Refills: 0 | Status: SHIPPED | OUTPATIENT
Start: 2022-09-27 | End: 2022-12-08 | Stop reason: SDUPTHER

## 2022-11-02 ENCOUNTER — OFFICE VISIT (OUTPATIENT)
Dept: FAMILY MEDICINE CLINIC | Facility: CLINIC | Age: 62
End: 2022-11-02

## 2022-11-02 VITALS
OXYGEN SATURATION: 97 % | BODY MASS INDEX: 40.82 KG/M2 | HEIGHT: 65 IN | HEART RATE: 68 BPM | WEIGHT: 245 LBS | SYSTOLIC BLOOD PRESSURE: 124 MMHG | DIASTOLIC BLOOD PRESSURE: 82 MMHG | TEMPERATURE: 98.2 F

## 2022-11-02 DIAGNOSIS — E03.9 HYPOTHYROIDISM (ACQUIRED): ICD-10-CM

## 2022-11-02 DIAGNOSIS — D68.61 ANTIPHOSPHOLIPID SYNDROME: ICD-10-CM

## 2022-11-02 DIAGNOSIS — M32.12 OTHER SYSTEMIC LUPUS ERYTHEMATOSUS WITH PERICARDITIS: Primary | ICD-10-CM

## 2022-11-02 DIAGNOSIS — I51.81 TAKOTSUBO CARDIOMYOPATHY: ICD-10-CM

## 2022-11-02 DIAGNOSIS — F41.8 DEPRESSION WITH ANXIETY: ICD-10-CM

## 2022-11-02 DIAGNOSIS — E66.01 MORBID EXOGENOUS OBESITY: ICD-10-CM

## 2022-11-02 DIAGNOSIS — K21.00 GASTROESOPHAGEAL REFLUX DISEASE WITH ESOPHAGITIS WITHOUT HEMORRHAGE: ICD-10-CM

## 2022-11-02 DIAGNOSIS — E55.9 VITAMIN D DEFICIENCY: ICD-10-CM

## 2022-11-02 DIAGNOSIS — G40.909 SEIZURE DISORDER: ICD-10-CM

## 2022-11-02 DIAGNOSIS — E27.1 ADDISON'S DISEASE: ICD-10-CM

## 2022-11-02 PROCEDURE — 99213 OFFICE O/P EST LOW 20 MIN: CPT | Performed by: FAMILY MEDICINE

## 2022-11-02 RX ORDER — HYDROXYCHLOROQUINE SULFATE 200 MG/1
2 TABLET, FILM COATED ORAL DAILY
COMMUNITY
Start: 2022-10-16

## 2022-11-02 RX ORDER — PANTOPRAZOLE SODIUM 40 MG/1
40 TABLET, DELAYED RELEASE ORAL 2 TIMES DAILY
Qty: 180 TABLET | Refills: 1 | Status: SHIPPED | OUTPATIENT
Start: 2022-11-02

## 2022-11-02 RX ORDER — PANTOPRAZOLE SODIUM 40 MG/1
40 TABLET, DELAYED RELEASE ORAL DAILY
COMMUNITY
Start: 2022-06-07 | End: 2022-11-02 | Stop reason: SDUPTHER

## 2022-11-02 RX ORDER — HYDROCORTISONE 20 MG/1
30 TABLET ORAL
COMMUNITY
Start: 2022-06-19

## 2022-11-02 RX ORDER — METOPROLOL SUCCINATE 50 MG/1
50 TABLET, EXTENDED RELEASE ORAL DAILY
Qty: 90 TABLET | Refills: 1 | Status: SHIPPED | OUTPATIENT
Start: 2022-11-02

## 2022-11-02 RX ORDER — LEVOTHYROXINE SODIUM 100 UG/1
100 TABLET ORAL DAILY
Qty: 90 TABLET | Refills: 1 | Status: SHIPPED | OUTPATIENT
Start: 2022-11-02

## 2022-11-02 NOTE — PROGRESS NOTES
Subjective   Eden Woods is a 62 y.o. female with   Chief Complaint   Patient presents with   • Lupus   .    History of Present Illness   62-year-old white female with multiple medical issues here for further medical management.  Patient with known history of [place]'s disease followed by an endocrinologist with [facility].  She apparently has an appointment with a different endocrinologist within the next 4 to 6 weeks who will be doing most of her care.  She also has history of hypothyroidism, vitamin D deficiency and morbid exogenous obesity.  She is on chronic steroids as above and is weaned from Cortef 180 mg daily down to 20 mg every morning and 10 mg every afternoon.  She is also recently diagnosed with SLE and is followed by rheumatology.  She has manifested the butterfly rash on the face but thus far has seen little other evidence of the disease.  She is using methotrexate at 200 mg using 2 tablets daily at the same time.  She has long history of chronic kidney disease and is status post CVA with seizure disorder.  She has been found to be cardiolipin positive and is anticoagulated with previous DVT and PE.  She is also followed by hematology.  Medications are extensive and are as listed.  All medications are used appropriately and are well-tolerated without side effects.  Patient has been driving now for several months without difficulty.  She does do some childcare and otherwise is attending doctors appointments.  The following portions of the patient's history were reviewed and updated as appropriate: allergies, current medications, past family history, past medical history, past social history, past surgical history and problem list.    Review of Systems   Gastrointestinal:        GERD   Endocrine:        [place]'s disease, morbid exogenous obesity, vitamin D deficiency, hypothyroidism,   Musculoskeletal:        SLE   Neurological: Positive for seizures.        Status post CVA   Psychiatric/Behavioral:  Positive for dysphoric mood. The patient is nervous/anxious.        Objective     Vitals:    11/02/22 1501   BP: 124/82   Pulse: 68   Temp: 98.2 °F (36.8 °C)   SpO2: 97%       Recent Results (from the past 672 hour(s))   PROTIME-INR    Collection Time: 10/18/22 12:54 PM    Specimen: Fresh Frozen Plasma    Specimen type and source: Plasma, Blood   Result Value Ref Range    Protime 21.9 (H) 10.3 - 13.3 s    INR 1.9 INR   AST    Collection Time: 10/18/22  1:48 PM    Specimen: Fresh Frozen Plasma    Specimen type and source: Plasma, Blood   Result Value Ref Range    AST (SGOT) 17 5 - 34 U/L   ALT    Collection Time: 10/18/22  1:48 PM    Specimen: Fresh Frozen Plasma    Specimen type and source: Plasma, Blood   Result Value Ref Range    ALT (SGPT) 12 0 - 55 U/L   C4 COMPLEMENT    Collection Time: 10/18/22  1:48 PM    Specimen type and source: Serum, Blood   Result Value Ref Range    C4 Complement 26 13 - 52 mg/dL   C3 COMPLEMENT    Collection Time: 10/18/22  1:48 PM    Specimen type and source: Serum, Blood   Result Value Ref Range    C3 Complement 126 88 - 203 mg/dL   Creatinine, Urine, Random -    Collection Time: 10/18/22  1:48 PM    Specimen: Urine    Specimen type and source: Urine, Urine   Result Value Ref Range    Creatinine Urine, Random 25.0 mg/dL   Protein, Urine, Random -    Collection Time: 10/18/22  1:48 PM    Specimen: Urine    Specimen type and source: Urine, Urine   Result Value Ref Range    Total Protein, Urine <3.0 1 - 14 mg/dL       Physical Exam  Vitals and nursing note reviewed.   Constitutional:       Appearance: Normal appearance. She is well-developed and well-groomed. She is morbidly obese.      Comments: Morbid exogenous obesity with a BMI of 40.8   HENT:      Head: Normocephalic and atraumatic.   Neck:      Thyroid: No thyroid mass or thyromegaly.      Vascular: Normal carotid pulses. No carotid bruit.      Trachea: Trachea and phonation normal.   Cardiovascular:      Rate and Rhythm: Normal  rate and regular rhythm.      Heart sounds: Normal heart sounds. No murmur heard.    No friction rub. No gallop.   Pulmonary:      Effort: Pulmonary effort is normal. No respiratory distress.      Breath sounds: Normal breath sounds. No decreased breath sounds, wheezing, rhonchi or rales.   Musculoskeletal:      Cervical back: Neck supple.   Lymphadenopathy:      Cervical: No cervical adenopathy.   Skin:     General: Skin is warm and dry.      Findings: No rash.   Neurological:      Mental Status: She is alert and oriented to person, place, and time.   Psychiatric:         Attention and Perception: Attention and perception normal.         Mood and Affect: Mood and affect normal.         Speech: Speech normal.         Behavior: Behavior normal. Behavior is cooperative.         Thought Content: Thought content normal.         Cognition and Memory: Cognition and memory normal.         Judgment: Judgment normal.         Assessment & Plan   Diagnoses and all orders for this visit:    1. Other systemic lupus erythematosus with pericarditis (HCC) (Primary)    2. Depression with anxiety  -     sertraline (ZOLOFT) 50 MG tablet; Take 1 tablet by mouth Daily.  Dispense: 90 tablet; Refill: 1    3. Hypothyroidism (acquired)  -     Levoxyl 100 MCG tablet; Take 1 tablet by mouth Daily.  Dispense: 90 tablet; Refill: 1    4. [place]'s disease (HCC)    5. Morbid exogenous obesity (HCC)    6. Vitamin D deficiency    7. Antiphospholipid syndrome (HCC)    8. Gastroesophageal reflux disease with esophagitis without hemorrhage  -     pantoprazole (PROTONIX) 40 MG EC tablet; Take 1 tablet by mouth 2 (Two) Times a Day.  Dispense: 180 tablet; Refill: 1    9. Seizure disorder (HCC)    10. Takotsubo cardiomyopathy  -     metoprolol succinate XL (Toprol XL) 50 MG 24 hr tablet; Take 1 tablet by mouth Daily.  Dispense: 90 tablet; Refill: 1        Return in about 6 months (around 5/2/2023) for Recheck.

## 2022-11-22 NOTE — PROGRESS NOTES
Subjective   Eden Woods is a 61 y.o. female with   Chief Complaint   Patient presents with   • Lupus     FOLLOW UP NEW DIAGNOSIS   .    History of Present Illness   61-year-old white female with multiple medical issues here for further medical management.  Patient was hospitalized not too long ago out of this area and was discharged with a new diagnosis of lupus erythematosus.  She has had a past history of acute pericarditis as well as myopericarditis.  She has antiphospholipid syndrome and is followed by hematology.  As result of this she has had deep vein thrombosis and is on anticoagulation for life.  She has had thrombocytopenia.  Several years ago underwent a near death experience following a bout of sepsis and was discharged with [place]'s disease and has resolved as well as hypothyroidism, hyperglycemia as well as vitamin D deficiency.  There is established dyskinesis of the esophagus as well as GERD, hiatal hernia and adenomatous polyps found in the colon.  She is postmenopausal and suffers from chronic kidney disease as a result of the septic event mentioned above.  She has had history of depression with anxiety features and at one time was treated for ADHD.  She is status post CVA and has an ongoing seizure disorder.  Medications are multiple and are as listed.  All medications are used appropriately and are well-tolerated without side effects.  Besides hematology she does see nephrology as well and has endocrinology.  Anticoagulation is with warfarin.  Since last hospitalization she has developed migraine headaches and is struggling to find an appropriate product to use for the acute migraine given the warfarin use.      The following portions of the patient's history were reviewed and updated as appropriate: allergies, current medications, past family history, past medical history, past social history, past surgical history and problem list.    Review of Systems   Cardiovascular:        Status  post CVA, past history of pericarditis and myocarditis.   Gastrointestinal:        GERD, esophageal dyskinesis, hiatal hernia   Endocrine:        Exogenous obesity, vitamin D deficiency, glucose intolerance, hypothyroidism, [place]'s disease   Genitourinary:        Chronic kidney disease   Musculoskeletal:        Systemic lupus erythematosus   Neurological: Positive for seizures and headaches.   Hematological:        Iron deficiency anemia       Objective     Vitals:    08/01/22 1409   BP: 124/72   Pulse: 75   Temp: 97.1 °F (36.2 °C)   SpO2: 98%       Recent Results (from the past 672 hour(s))   Protein, Urine, Random -    Collection Time: 07/27/22  3:49 PM    Specimen: Urine    Specimen type and source: Urine, Urine   Result Value Ref Range    Total Protein, Urine <3.0 1 - 14 mg/dL   C4 COMPLEMENT    Collection Time: 07/27/22  3:49 PM    Specimen type and source: Serum, Blood   Result Value Ref Range    C4 Complement 24 13 - 52 mg/dL   C3 COMPLEMENT    Collection Time: 07/27/22  3:49 PM    Specimen type and source: Serum, Blood   Result Value Ref Range    C3 Complement 115 88 - 203 mg/dL   Creatinine, Urine, Random -    Collection Time: 07/27/22  3:49 PM    Specimen: Urine    Specimen type and source: Urine, Urine   Result Value Ref Range    Creatinine Urine, Random 32.0 mg/dL mg/dL       Physical Exam  Vitals and nursing note reviewed.   Constitutional:       Appearance: Normal appearance. She is well-developed and well-groomed. She is morbidly obese.      Comments: Morbid exogenous obesity with a BMI of 40.1   HENT:      Head: Normocephalic and atraumatic.   Neck:      Thyroid: No thyroid mass or thyromegaly.      Vascular: Normal carotid pulses. No carotid bruit.      Trachea: Trachea and phonation normal.   Cardiovascular:      Rate and Rhythm: Normal rate and regular rhythm.      Heart sounds: Normal heart sounds. No murmur heard.    No friction rub. No gallop.   Pulmonary:      Effort: Pulmonary effort is  normal. No respiratory distress.      Breath sounds: Normal breath sounds. No decreased breath sounds, wheezing, rhonchi or rales.   Musculoskeletal:      Cervical back: Neck supple.   Lymphadenopathy:      Cervical: No cervical adenopathy.   Skin:     General: Skin is warm and dry.      Findings: No rash.   Neurological:      Mental Status: She is alert and oriented to person, place, and time.   Psychiatric:         Attention and Perception: Attention and perception normal.         Mood and Affect: Mood and affect normal.         Speech: Speech normal.         Behavior: Behavior normal. Behavior is cooperative.         Thought Content: Thought content normal.         Cognition and Memory: Cognition and memory normal.         Judgment: Judgment normal.         Assessment & Plan   Diagnoses and all orders for this visit:    1. Systemic lupus erythematosus (SLE) with pericarditis, unspecified SLE type (HCC) (Primary)    2. Migraine without aura and without status migrainosus, not intractable  -     ubrogepant (ubrogepant) 50 MG tablet; 1 po at onset migraine. May repeat in 2 hours if needed. Not to exceed 100 mg per day  Dispense: 30 tablet; Refill: 3    3. Antiphospholipid syndrome (HCC)    4. Chronic deep vein thrombosis (DVT) of distal vein of lower extremity, unspecified laterality ([facility])    5. Long term (current) use of anticoagulants    6. [place]'s disease (HCC)    7. Hypothyroidism (acquired)    8. Vitamin D deficiency    9. Morbid exogenous obesity (HCC)    10. Depression with anxiety    11. Stage 3b chronic kidney disease (HCC)    12. Seizure disorder (HCC)    Patient has been asked to diary her headaches in regards to frequency and number of impaired days.  Follow-up in 3 months with this list unless otherwise indicated.    Return in about 3 months (around 11/1/2022) for Recheck.          Estimated Blood Loss (Cc): minimal

## 2022-12-08 NOTE — TELEPHONE ENCOUNTER
Caller: Eden Woods    Relationship: Self    Best call back number: [phone]    Requested Prescriptions:   Requested Prescriptions     Pending Prescriptions Disp Refills   • valACYclovir (VALTREX) 1000 MG tablet 30 tablet 0        Pharmacy where request should be sent: [facility] [id number] [facility] [address] AT [facility] [phone] [facility] [phone] FX     Additional details provided by patient: 1 PILL LEFT    Does the patient have less than a 3 day supply:  [x] Yes  [] No    Would you like a call back once the refill request has been completed: [] Yes [x] No    If the office needs to give you a call back, can they leave a voicemail: [x] Yes [] No    Isaac Mack Rep   12/08/22 08:51 EST

## 2022-12-09 RX ORDER — VALACYCLOVIR HYDROCHLORIDE 1 G/1
TABLET, FILM COATED ORAL
Qty: 30 TABLET | Refills: 0 | Status: SHIPPED | OUTPATIENT
Start: 2022-12-09 | End: 2023-03-08

## 2023-02-10 DIAGNOSIS — R11.0 NAUSEA: ICD-10-CM

## 2023-02-10 RX ORDER — ONDANSETRON 4 MG/1
TABLET, ORALLY DISINTEGRATING ORAL
Qty: 20 TABLET | Refills: 0 | Status: SHIPPED | OUTPATIENT
Start: 2023-02-10

## 2023-03-08 RX ORDER — VALACYCLOVIR HYDROCHLORIDE 1 G/1
TABLET, FILM COATED ORAL
Qty: 30 TABLET | Refills: 0 | Status: SHIPPED | OUTPATIENT
Start: 2023-03-08 | End: 2023-03-26 | Stop reason: SDUPTHER

## 2023-03-26 RX ORDER — VALACYCLOVIR HYDROCHLORIDE 1 G/1
TABLET, FILM COATED ORAL
Qty: 30 TABLET | Refills: 0 | Status: SHIPPED | OUTPATIENT
Start: 2023-03-26

## 2023-03-27 RX ORDER — VALACYCLOVIR HYDROCHLORIDE 1 G/1
TABLET, FILM COATED ORAL
Qty: 30 TABLET | Refills: 0 | OUTPATIENT
Start: 2023-03-27

## 2023-05-02 DIAGNOSIS — F41.8 DEPRESSION WITH ANXIETY: ICD-10-CM

## 2023-05-03 ENCOUNTER — OFFICE VISIT (OUTPATIENT)
Dept: FAMILY MEDICINE CLINIC | Facility: CLINIC | Age: 63
End: 2023-05-03
Payer: COMMERCIAL

## 2023-05-03 VITALS
TEMPERATURE: 97.3 F | DIASTOLIC BLOOD PRESSURE: 76 MMHG | HEIGHT: 65 IN | WEIGHT: 241 LBS | SYSTOLIC BLOOD PRESSURE: 120 MMHG | BODY MASS INDEX: 40.15 KG/M2 | HEART RATE: 56 BPM

## 2023-05-03 DIAGNOSIS — I51.81 TAKOTSUBO CARDIOMYOPATHY: ICD-10-CM

## 2023-05-03 DIAGNOSIS — B00.9 HERPES SIMPLEX TYPE 1 INFECTION: ICD-10-CM

## 2023-05-03 DIAGNOSIS — F41.8 DEPRESSION WITH ANXIETY: Primary | ICD-10-CM

## 2023-05-03 DIAGNOSIS — K21.00 GASTROESOPHAGEAL REFLUX DISEASE WITH ESOPHAGITIS WITHOUT HEMORRHAGE: ICD-10-CM

## 2023-05-03 PROCEDURE — 99213 OFFICE O/P EST LOW 20 MIN: CPT | Performed by: FAMILY MEDICINE

## 2023-05-03 RX ORDER — HYDROCORTISONE 10 MG/1
TABLET ORAL
COMMUNITY
Start: 2023-02-09

## 2023-05-03 RX ORDER — LEVOTHYROXINE SODIUM 0.12 MG/1
125 TABLET ORAL DAILY
Qty: 30 TABLET | Refills: 0
Start: 2023-05-03

## 2023-05-03 RX ORDER — MOMETASONE FUROATE 50 UG/1
2 SPRAY, METERED NASAL DAILY
COMMUNITY

## 2023-05-03 RX ORDER — VALACYCLOVIR HYDROCHLORIDE 1 G/1
TABLET, FILM COATED ORAL
Qty: 30 TABLET | Refills: 0 | Status: SHIPPED | OUTPATIENT
Start: 2023-05-03

## 2023-05-03 RX ORDER — SERTRALINE HYDROCHLORIDE 25 MG/1
25 TABLET, FILM COATED ORAL DAILY
Qty: 30 TABLET | Refills: 0 | Status: SHIPPED | OUTPATIENT
Start: 2023-05-03

## 2023-05-03 RX ORDER — PANTOPRAZOLE SODIUM 40 MG/1
40 TABLET, DELAYED RELEASE ORAL 2 TIMES DAILY
Qty: 180 TABLET | Refills: 1 | Status: SHIPPED | OUTPATIENT
Start: 2023-05-03

## 2023-05-03 RX ORDER — METOPROLOL SUCCINATE 50 MG/1
50 TABLET, EXTENDED RELEASE ORAL DAILY
Qty: 90 TABLET | Refills: 1 | Status: SHIPPED | OUTPATIENT
Start: 2023-05-03

## 2023-05-04 NOTE — PROGRESS NOTES
Subjective   Eden Woods is a 62 y.o. female with   Chief Complaint   Patient presents with   • Lupus   .    History of Present Illness   62-year-old white female with multiple medical issues here for further medical management.  Patient with Cotton's disease as well as hypothyroidism and is followed by endocrine.  She has a new endocrinologist who is now controlling these meds.  Her thyroid has been increased to 125 mcg daily.  She has also been followed by hematology in regards to antiphospholipid syndrome.  She continues to use warfarin and this is followed by hematology.  She has had past history of depression with anxiety features and states that this has been somewhat resolved for quite some time.  She would like to use this spring time to wean from sertraline.  She will also need refills of some of her usual medications.  Overall she has lost more than 30 pounds and feels like she has a new lease on life.  The following portions of the patient's history were reviewed and updated as appropriate: allergies, current medications, past family history, past medical history, past social history, past surgical history and problem list.    Review of Systems   Endocrine:        Cot[place]'s disease, hypothyroidism   Skin:        Herpes labialis   Hematological:        Antiphospholipid syndrome   Psychiatric/Behavioral: Positive for dysphoric mood. The patient is nervous/anxious.        Objective     Vitals:    05/03/23 1521   BP: 120/76   Pulse: 56   Temp: 97.3 °F (36.3 °C)       Recent Results (from the past 672 hour(s))   CBC AND DIFFERENTIAL    Collection Time: 04/06/23 11:55 AM    Specimen type and source: Whole Blood, Blood   Result Value Ref Range    WBC 7.32 4.5 - 11.0 10*3/uL    RBC 4.52 4.0 - 5.2 10*6/uL    Hemoglobin 13.7 12.0 - 16.0 g/dL    Hematocrit 42.5 36.0 - 46.0 %    MCV 94.0 80.0 - 100.0 fL    MCH 30.3 26.0 - 34.0 pg    MCHC 32.2 31.0 - 37.0 g/dL    RDW 14.9 12.0 - 16.8 %    Platelets 166 140 - 440  10*3/uL    MPV 10.4 8.4 - 12.4 fL    Differential Type Physician Office CBC w/AutoDiff (arb'U)    Neutrophil Rel % 73.9 45 - 80 %    Lymphocyte Rel % 18.2 15 - 50 %    Monocyte Rel % 4.5 0 - 15 %    Eosinophil % 2.6 0 - 7 %    Basophil Rel % 0.5 0 - 2 %    Neutrophils Absolute 5.40 2.0 - 8.8 10*3/uL    Lymphocytes Absolute 1.33 0.7 - 5.5 10*3/uL    Monocytes Absolute 0.33 0.0 - 1.7 10*3/uL    Eosinophils Absolute 0.19 0.0 - 0.8 10*3/uL    Basophils Absolute 0.04 0.0 - 0.2 10*3/uL    Immature Grans % 0.3 0.0 - 1.0 %    Immature Grans, Absolute 0.02 0.00 - 0.10 10*3/uL   PROTIME-INR    Collection Time: 04/06/23 11:55 AM    Specimen: Fresh Frozen Plasma    Specimen type and source: Plasma, Blood   Result Value Ref Range    Protime 15.4 (H) 10.3 - 13.3 s    INR 1.3 INR   PROTIME-INR    Collection Time: 04/11/23  1:35 PM    Specimen: Fresh Frozen Plasma    Specimen type and source: Plasma, Blood   Result Value Ref Range    Protime 17.8 (H) 10.3 - 13.3 s    INR 1.5 INR   PROTIME-INR    Collection Time: 04/18/23 10:11 AM    Specimen: Fresh Frozen Plasma    Specimen type and source: Plasma, Blood   Result Value Ref Range    Protime 22.8 (H) 10.3 - 13.3 s    INR 1.9 INR   PROTIME-INR    Collection Time: 05/02/23  9:34 AM    Specimen: Fresh Frozen Plasma    Specimen type and source: Plasma, Blood   Result Value Ref Range    Protime 25.8 (H) 10.3 - 13.3 s    INR 2.2 INR       Physical Exam  Vitals and nursing note reviewed.   Constitutional:       Appearance: Normal appearance. She is well-developed and well-groomed. She is morbidly obese.      Comments: Morbid exogenous obesity with a BMI of 40.1   HENT:      Head: Normocephalic and atraumatic.   Neck:      Thyroid: No thyroid mass or thyromegaly.      Vascular: Normal carotid pulses. No carotid bruit.      Trachea: Trachea and phonation normal.   Cardiovascular:      Rate and Rhythm: Normal rate and regular rhythm.      Heart sounds: Normal heart sounds. No murmur heard.     No friction rub. No gallop.   Pulmonary:      Effort: Pulmonary effort is normal. No respiratory distress.      Breath sounds: Normal breath sounds. No decreased breath sounds, wheezing, rhonchi or rales.   Musculoskeletal:      Cervical back: Neck supple.   Lymphadenopathy:      Cervical: No cervical adenopathy.   Skin:     General: Skin is warm and dry.      Findings: No rash.   Neurological:      Mental Status: She is alert and oriented to person, place, and time.   Psychiatric:         Attention and Perception: Attention and perception normal.         Mood and Affect: Mood and affect normal.         Speech: Speech normal.         Behavior: Behavior normal. Behavior is cooperative.         Thought Content: Thought content normal.         Cognition and Memory: Cognition and memory normal.         Judgment: Judgment normal.         Assessment & Plan   Diagnoses and all orders for this visit:    1. Depression with anxiety (Primary)  -     sertraline (Zoloft) 25 MG tablet; Take 1 tablet by mouth Daily.  Dispense: 30 tablet; Refill: 0    2. Herpes simplex type 1 infection    3. Gastroesophageal reflux disease with esophagitis without hemorrhage  -     pantoprazole (PROTONIX) 40 MG EC tablet; Take 1 tablet by mouth 2 (Two) Times a Day.  Dispense: 180 tablet; Refill: 1    4. Takotsubo cardiomyopathy  -     metoprolol succinate XL (Toprol XL) 50 MG 24 hr tablet; Take 1 tablet by mouth Daily.  Dispense: 90 tablet; Refill: 1    Other orders  -     valACYclovir (VALTREX) 1000 MG tablet; TAKE TWO TABLETS BY MOUTH TWICE A DAY FOR ONE DAY WITH OUTBREAK  Dispense: 30 tablet; Refill: 0  -     levothyroxine (Synthroid) 125 MCG tablet; Take 1 tablet by mouth Daily.  Dispense: 30 tablet; Refill: 0        Return in about 1 year (around 5/3/2024) for Recheck.

## 2023-06-12 RX ORDER — VALACYCLOVIR HYDROCHLORIDE 1 G/1
TABLET, FILM COATED ORAL
Qty: 30 TABLET | Refills: 0 | Status: SHIPPED | OUTPATIENT
Start: 2023-06-12

## 2023-08-25 RX ORDER — VALACYCLOVIR HYDROCHLORIDE 1 G/1
TABLET, FILM COATED ORAL
Qty: 30 TABLET | Refills: 0 | Status: SHIPPED | OUTPATIENT
Start: 2023-08-25

## 2023-08-30 ENCOUNTER — OFFICE VISIT (OUTPATIENT)
Dept: FAMILY MEDICINE CLINIC | Facility: CLINIC | Age: 63
End: 2023-08-30
Payer: COMMERCIAL

## 2023-08-30 ENCOUNTER — TELEPHONE (OUTPATIENT)
Dept: FAMILY MEDICINE CLINIC | Facility: CLINIC | Age: 63
End: 2023-08-30

## 2023-08-30 VITALS
DIASTOLIC BLOOD PRESSURE: 80 MMHG | OXYGEN SATURATION: 97 % | BODY MASS INDEX: 38.82 KG/M2 | WEIGHT: 233 LBS | HEIGHT: 65 IN | TEMPERATURE: 97.9 F | HEART RATE: 69 BPM | SYSTOLIC BLOOD PRESSURE: 120 MMHG

## 2023-08-30 DIAGNOSIS — R30.0 DYSURIA: ICD-10-CM

## 2023-08-30 DIAGNOSIS — N30.00 ACUTE CYSTITIS WITHOUT HEMATURIA: Primary | ICD-10-CM

## 2023-08-30 DIAGNOSIS — R35.0 URINARY FREQUENCY: ICD-10-CM

## 2023-08-30 PROBLEM — R65.20 SEVERE SEPSIS: Status: ACTIVE | Noted: 2023-02-25

## 2023-08-30 PROBLEM — A41.9 SEVERE SEPSIS: Status: ACTIVE | Noted: 2023-02-25

## 2023-08-30 LAB
BILIRUB BLD-MCNC: NEGATIVE MG/DL
CLARITY, POC: CLEAR
COLOR UR: YELLOW
GLUCOSE UR STRIP-MCNC: NEGATIVE MG/DL
KETONES UR QL: NEGATIVE
LEUKOCYTE EST, POC: NEGATIVE
NITRITE UR-MCNC: NEGATIVE MG/ML
PH UR: 7 [PH] (ref 5–8)
PROT UR STRIP-MCNC: ABNORMAL MG/DL
RBC # UR STRIP: NEGATIVE /UL
SP GR UR: 1 (ref 1–1.03)
UROBILINOGEN UR QL: NORMAL

## 2023-08-30 PROCEDURE — 81002 URINALYSIS NONAUTO W/O SCOPE: CPT | Performed by: FAMILY MEDICINE

## 2023-08-30 PROCEDURE — 99213 OFFICE O/P EST LOW 20 MIN: CPT | Performed by: FAMILY MEDICINE

## 2023-08-30 RX ORDER — LIOTHYRONINE SODIUM 5 UG/1
TABLET ORAL
COMMUNITY
Start: 2023-08-01

## 2023-08-30 RX ORDER — CEPHALEXIN 500 MG/1
500 CAPSULE ORAL 3 TIMES DAILY
Qty: 21 CAPSULE | Refills: 0 | Status: SHIPPED | OUTPATIENT
Start: 2023-08-30

## 2023-08-30 RX ORDER — HYDROCORTISONE SODIUM SUCCINATE 100 MG/2ML
INJECTION, POWDER, FOR SOLUTION INTRAMUSCULAR; INTRAVENOUS
COMMUNITY
Start: 2023-08-21

## 2023-08-30 NOTE — PROGRESS NOTES
Subjective   Eden Woods is a 63 y.o. female with   Chief Complaint   Patient presents with    burning and urinary frequency   .    History of Present Illness   63-year-old white female with several day history of dysuria, frequency and urgency.  Her last urinary tract infection was 7 to 8 months ago and successfully treated with Keflex.  She is on warfarin due to antiphospholipid syndrome and is status post DVT.  She denies hematuria.  There has been no flank pain, nausea/vomiting or fever.  The following portions of the patient's history were reviewed and updated as appropriate: allergies, current medications, past family history, past medical history, past social history, past surgical history and problem list.    Review of Systems   Constitutional:  Negative for fatigue and fever.   Genitourinary:  Positive for dysuria, frequency and urgency. Negative for flank pain and hematuria.     Objective     Vitals:    08/30/23 1411   BP: 120/80   Pulse: 69   Temp: 97.9 øF (36.6 øC)   SpO2: 97%       Recent Results (from the past 672 hour(s))   PROTIME-INR    Collection Time: 08/21/23 11:48 AM    Specimen: Fresh Frozen Plasma    Specimen type and source: Plasma, Blood   Result Value Ref Range    Protime 66.7 (H) 10.3 - 13.3 s    INR 5.5 (C) INR   PROTIME-INR    Collection Time: 08/28/23 11:41 AM    Specimen: Fresh Frozen Plasma    Specimen type and source: Plasma, Blood   Result Value Ref Range    Protime 37.3 (H) 10.3 - 13.3 s    INR 3.1 INR   POC Urinalysis Dipstick    Collection Time: 08/30/23  2:21 PM    Specimen: Urine   Result Value Ref Range    Color Yellow Yellow, Straw, Dark Yellow, Neida    Clarity, UA Clear Clear    Glucose, UA Negative Negative mg/dL    Bilirubin Negative Negative    Ketones, UA Negative Negative    Specific Gravity  1.005 1.005 - 1.030    Blood, UA Negative Negative    pH, Urine 7.0 5.0 - 8.0    Protein, POC Trace (A) Negative mg/dL    Urobilinogen, UA Normal Normal, 0.2 E.U./dL     Leukocytes Negative Negative    Nitrite, UA Negative Negative       Physical Exam  Vitals and nursing note reviewed.   Constitutional:       Appearance: Normal appearance. She is well-developed and well-groomed.   HENT:      Head: Normocephalic and atraumatic.   Neck:      Thyroid: No thyroid mass or thyromegaly.      Vascular: Normal carotid pulses. No carotid bruit.      Trachea: Trachea and phonation normal.   Cardiovascular:      Rate and Rhythm: Normal rate and regular rhythm.      Heart sounds: Normal heart sounds. No murmur heard.    No friction rub. No gallop.   Pulmonary:      Effort: Pulmonary effort is normal. No respiratory distress.      Breath sounds: Normal breath sounds. No decreased breath sounds, wheezing, rhonchi or rales.   Abdominal:      General: Abdomen is flat. Bowel sounds are normal. There is no distension.      Palpations: Abdomen is soft. There is no hepatomegaly, splenomegaly or mass.      Tenderness: There is no abdominal tenderness. There is no right CVA tenderness, left CVA tenderness, guarding or rebound.      Hernia: No hernia is present.   Musculoskeletal:      Cervical back: Neck supple.   Lymphadenopathy:      Cervical: No cervical adenopathy.   Skin:     General: Skin is warm and dry.      Findings: No rash.   Neurological:      Mental Status: She is alert and oriented to person, place, and time.   Psychiatric:         Attention and Perception: Attention and perception normal.         Mood and Affect: Mood and affect normal.         Speech: Speech normal.         Behavior: Behavior normal. Behavior is cooperative.         Thought Content: Thought content normal.         Cognition and Memory: Cognition and memory normal.         Judgment: Judgment normal.   Patient's (Body mass index is 38.77 kg/mý.) indicates that they are obese (BMI >30) with health related conditions that include obstructive sleep apnea, hypertension, coronary heart disease, diabetes mellitus, dyslipidemias,  GERD, peripheral vascular disease, idiopathic intracranial hypertension, osteoarthritis, lower extremity venous stasis disease, urinary stress incontinence, and peripheral vascular disease . Weight is unchanged. BMI is is above average; BMI management plan is completed. We discussed portion control and increasing exercise.      Assessment & Plan   Diagnoses and all orders for this visit:    1. Acute cystitis without hematuria (Primary)  -     cephalexin (Keflex) 500 MG capsule; Take 1 capsule by mouth 3 (Three) Times a Day.  Dispense: 21 capsule; Refill: 0    2. Dysuria  -     POC Urinalysis Dipstick  -     Urine Culture - Urine, Urine, Clean Catch    3. Urinary frequency  -     Urine Culture - Urine, Urine, Clean Catch        Return if symptoms worsen or fail to improve.

## 2023-08-30 NOTE — TELEPHONE ENCOUNTER
Caller: Eden Woods    Relationship: Self    Best call back number:     What is the best time to reach you:     Who are you requesting to speak with (clinical staff, provider,  specific staff member):     Do you know the name of the person who called:     What was the call regarding: PATIENT FEELS THAT SHE MAY HAVE A URINARY TRACT INFECTION AND IS CALLING IN TO REQUEST A CALL FROM DR MORALES OR STAFF TO DISCUSS.    Is it okay if the provider responds through MyChart: YES

## 2023-09-01 ENCOUNTER — TELEPHONE (OUTPATIENT)
Dept: FAMILY MEDICINE CLINIC | Facility: CLINIC | Age: 63
End: 2023-09-01
Payer: COMMERCIAL

## 2023-09-01 LAB
BACTERIA UR CULT: NO GROWTH
BACTERIA UR CULT: NORMAL

## 2023-09-01 NOTE — TELEPHONE ENCOUNTER
Pt is asking if you would give her a script for stahist.  She always got it otc but now must have a script and she did just see Nathaniel earlier this week.

## 2023-09-01 NOTE — TELEPHONE ENCOUNTER
Caller: Eden Woods    Relationship: Self    Best call back number: [phone]    Caller requesting test results: PATIENT     What test was performed: URINE CULTURE     When was the test performed: 8/30/2023    Where was the test performed: IN OFFICE     Additional notes: PATIENT IS REQUESTING A CALLBACK TO DISCUSS IF SHE NEEDS TO HAVE AN ANTIBIOTIC CHANGE DUE TO THE RESULTS OF THE THE URINE CULTURE BEING DIFFERENT THAN WHAT WAS EXPECTED.     [facility] [id number] [facility] [address] [facility] [place] [address] [facility] [phone] [facility] [phone]  [phone]

## 2023-10-18 RX ORDER — VALACYCLOVIR HYDROCHLORIDE 1 G/1
TABLET, FILM COATED ORAL
Qty: 30 TABLET | Refills: 0 | Status: SHIPPED | OUTPATIENT
Start: 2023-10-18

## 2023-12-05 RX ORDER — VALACYCLOVIR HYDROCHLORIDE 1 G/1
TABLET, FILM COATED ORAL
Qty: 30 TABLET | Refills: 0 | Status: SHIPPED | OUTPATIENT
Start: 2023-12-05

## 2024-01-29 ENCOUNTER — TELEPHONE (OUTPATIENT)
Dept: FAMILY MEDICINE CLINIC | Facility: CLINIC | Age: 64
End: 2024-01-29

## 2024-01-29 NOTE — TELEPHONE ENCOUNTER
Caller: Eden Woods    Relationship: Self    Best call back number: [phone]     PATIENT IS HAVING PAIN IN HER RIGHT BUTTOCKS.  SHE HAS BEEN HAVING MUSCLE SPASMS IN THAT RIGHT SIDE OF HER BUTTOCKS.   IT HURTS WHEN SHE LAYS DOWN OR SITS ON IT, STANDING, ETC.   THERE IS A DULL ACHE ALL THE TIME, BUT WHEN SHE PUTS PRESSURE ON IT IT HURTS. DIFFICULTY LIFTING HER RIGHT LEG WITHOUT BEING IN PAIN.    THIS HAS BEEN GOING ON SINCE THIS PAST SATURDAY 1/27/24.       SHE IS GOING TO SEE HER ENDOCRINOLOGIST TODAY AT 11 AM BUT IS REQUESTING A CALLBACK FROM THE NURSE.

## 2024-03-11 RX ORDER — VALACYCLOVIR HYDROCHLORIDE 1 G/1
TABLET, FILM COATED ORAL
Qty: 30 TABLET | Refills: 0 | Status: SHIPPED | OUTPATIENT
Start: 2024-03-11

## 2024-04-19 RX ORDER — VALACYCLOVIR HYDROCHLORIDE 1 G/1
TABLET, FILM COATED ORAL
Qty: 30 TABLET | Refills: 0 | Status: SHIPPED | OUTPATIENT
Start: 2024-04-19

## 2024-05-08 ENCOUNTER — TELEMEDICINE (OUTPATIENT)
Dept: FAMILY MEDICINE CLINIC | Facility: CLINIC | Age: 64
End: 2024-05-08
Payer: COMMERCIAL

## 2024-05-08 DIAGNOSIS — B00.1 HERPES LABIALIS: Primary | ICD-10-CM

## 2024-05-08 PROCEDURE — 99213 OFFICE O/P EST LOW 20 MIN: CPT | Performed by: FAMILY MEDICINE

## 2024-05-08 RX ORDER — WARFARIN SODIUM 10 MG/1
10 TABLET ORAL
COMMUNITY
Start: 2024-02-05

## 2024-06-28 RX ORDER — VALACYCLOVIR HYDROCHLORIDE 1 G/1
TABLET, FILM COATED ORAL
Qty: 30 TABLET | Refills: 0 | Status: SHIPPED | OUTPATIENT
Start: 2024-06-28

## 2024-08-20 RX ORDER — VALACYCLOVIR HYDROCHLORIDE 1 G/1
TABLET, FILM COATED ORAL
Qty: 30 TABLET | Refills: 0 | Status: SHIPPED | OUTPATIENT
Start: 2024-08-20

## 2024-09-18 DIAGNOSIS — K21.00 GASTROESOPHAGEAL REFLUX DISEASE WITH ESOPHAGITIS WITHOUT HEMORRHAGE: ICD-10-CM

## 2024-09-18 RX ORDER — PANTOPRAZOLE SODIUM 40 MG/1
40 TABLET, DELAYED RELEASE ORAL 2 TIMES DAILY
Qty: 180 TABLET | Refills: 1 | Status: SHIPPED | OUTPATIENT
Start: 2024-09-18

## 2024-11-18 RX ORDER — VALACYCLOVIR HYDROCHLORIDE 1 G/1
TABLET, FILM COATED ORAL
Qty: 30 TABLET | Refills: 0 | Status: SHIPPED | OUTPATIENT
Start: 2024-11-18

## 2025-02-03 RX ORDER — VALACYCLOVIR HYDROCHLORIDE 1 G/1
TABLET, FILM COATED ORAL
Qty: 30 TABLET | Refills: 0 | Status: SHIPPED | OUTPATIENT
Start: 2025-02-03

## 2025-04-01 RX ORDER — VALACYCLOVIR HYDROCHLORIDE 1 G/1
TABLET, FILM COATED ORAL
Qty: 30 TABLET | Refills: 0 | Status: SHIPPED | OUTPATIENT
Start: 2025-04-01 | End: 2025-04-01 | Stop reason: SDUPTHER

## 2025-04-01 RX ORDER — VALACYCLOVIR HYDROCHLORIDE 1 G/1
TABLET, FILM COATED ORAL
Qty: 30 TABLET | Refills: 0 | Status: SHIPPED | OUTPATIENT
Start: 2025-04-01

## 2025-06-11 RX ORDER — VALACYCLOVIR HYDROCHLORIDE 1 G/1
TABLET, FILM COATED ORAL
Qty: 30 TABLET | Refills: 0 | Status: SHIPPED | OUTPATIENT
Start: 2025-06-11

## 2025-08-11 DIAGNOSIS — K21.00 GASTROESOPHAGEAL REFLUX DISEASE WITH ESOPHAGITIS WITHOUT HEMORRHAGE: ICD-10-CM

## 2025-08-12 RX ORDER — PANTOPRAZOLE SODIUM 40 MG/1
40 TABLET, DELAYED RELEASE ORAL 2 TIMES DAILY
Qty: 60 TABLET | Refills: 0 | Status: SHIPPED | OUTPATIENT
Start: 2025-08-12 | End: 2025-08-15 | Stop reason: SDUPTHER

## 2025-08-14 ENCOUNTER — OFFICE VISIT (OUTPATIENT)
Dept: FAMILY MEDICINE CLINIC | Facility: CLINIC | Age: 65
End: 2025-08-14
Payer: MEDICAID

## 2025-08-14 VITALS
OXYGEN SATURATION: 98 % | BODY MASS INDEX: 41.59 KG/M2 | DIASTOLIC BLOOD PRESSURE: 70 MMHG | TEMPERATURE: 97.2 F | WEIGHT: 249.6 LBS | HEIGHT: 65 IN | HEART RATE: 85 BPM | SYSTOLIC BLOOD PRESSURE: 120 MMHG

## 2025-08-14 DIAGNOSIS — K21.00 GASTROESOPHAGEAL REFLUX DISEASE WITH ESOPHAGITIS WITHOUT HEMORRHAGE: Primary | ICD-10-CM

## 2025-08-14 DIAGNOSIS — E66.01 MORBID EXOGENOUS OBESITY: ICD-10-CM

## 2025-08-14 DIAGNOSIS — B00.1 HERPES LABIALIS: ICD-10-CM

## 2025-08-14 RX ORDER — COLCHICINE 0.6 MG/1
0.6 TABLET ORAL DAILY
COMMUNITY
Start: 2025-03-24

## 2025-08-14 RX ORDER — MIDAZOLAM 5 MG/.1ML
5 SPRAY NASAL AS NEEDED
COMMUNITY
Start: 2025-04-01

## 2025-08-14 RX ORDER — POTASSIUM CHLORIDE 600 MG/1
8 TABLET, EXTENDED RELEASE ORAL
COMMUNITY
Start: 2025-04-21

## 2025-08-14 RX ORDER — FUROSEMIDE 20 MG/1
20 TABLET ORAL AS NEEDED
COMMUNITY
Start: 2025-03-07

## 2025-08-15 RX ORDER — VALACYCLOVIR HYDROCHLORIDE 1 G/1
TABLET, FILM COATED ORAL
Qty: 30 TABLET | Refills: 2 | Status: SHIPPED | OUTPATIENT
Start: 2025-08-15

## 2025-08-15 RX ORDER — PANTOPRAZOLE SODIUM 40 MG/1
40 TABLET, DELAYED RELEASE ORAL 2 TIMES DAILY
Qty: 180 TABLET | Refills: 1 | Status: SHIPPED | OUTPATIENT
Start: 2025-08-15

## 2025-08-28 DIAGNOSIS — E66.01 MORBID EXOGENOUS OBESITY: Primary | ICD-10-CM

## 2025-08-28 RX ORDER — SEMAGLUTIDE 0.25 MG/.5ML
0.25 INJECTION, SOLUTION SUBCUTANEOUS WEEKLY
Qty: 2 ML | Refills: 0 | Status: SHIPPED | OUTPATIENT
Start: 2025-08-28